# Patient Record
Sex: MALE | Race: WHITE | NOT HISPANIC OR LATINO | Employment: OTHER | URBAN - METROPOLITAN AREA
[De-identification: names, ages, dates, MRNs, and addresses within clinical notes are randomized per-mention and may not be internally consistent; named-entity substitution may affect disease eponyms.]

---

## 2020-12-03 ENCOUNTER — TELEPHONE (OUTPATIENT)
Dept: ADMINISTRATIVE | Facility: HOSPITAL | Age: 60
End: 2020-12-03

## 2020-12-03 ENCOUNTER — OFFICE VISIT (OUTPATIENT)
Dept: FAMILY MEDICINE | Facility: CLINIC | Age: 60
End: 2020-12-03
Payer: COMMERCIAL

## 2020-12-03 VITALS
BODY MASS INDEX: 30.55 KG/M2 | WEIGHT: 213.38 LBS | DIASTOLIC BLOOD PRESSURE: 92 MMHG | SYSTOLIC BLOOD PRESSURE: 152 MMHG | HEIGHT: 70 IN | OXYGEN SATURATION: 95 % | TEMPERATURE: 98 F | HEART RATE: 85 BPM

## 2020-12-03 DIAGNOSIS — E78.2 HYPERLIPIDEMIA, MIXED: ICD-10-CM

## 2020-12-03 DIAGNOSIS — R97.20 ELEVATED PSA: ICD-10-CM

## 2020-12-03 DIAGNOSIS — Z00.00 ANNUAL PHYSICAL EXAM: ICD-10-CM

## 2020-12-03 DIAGNOSIS — Z11.4 SCREENING FOR HIV (HUMAN IMMUNODEFICIENCY VIRUS): ICD-10-CM

## 2020-12-03 DIAGNOSIS — I10 ESSENTIAL HYPERTENSION: Primary | ICD-10-CM

## 2020-12-03 DIAGNOSIS — N40.0 BENIGN PROSTATIC HYPERPLASIA, UNSPECIFIED WHETHER LOWER URINARY TRACT SYMPTOMS PRESENT: ICD-10-CM

## 2020-12-03 DIAGNOSIS — Z11.59 SCREENING FOR VIRAL DISEASE: ICD-10-CM

## 2020-12-03 DIAGNOSIS — R68.82 DECREASED LIBIDO: ICD-10-CM

## 2020-12-03 PROCEDURE — 3008F PR BODY MASS INDEX (BMI) DOCUMENTED: ICD-10-PCS | Mod: CPTII,S$GLB,, | Performed by: INTERNAL MEDICINE

## 2020-12-03 PROCEDURE — 1126F PR PAIN SEVERITY QUANTIFIED, NO PAIN PRESENT: ICD-10-PCS | Mod: S$GLB,,, | Performed by: INTERNAL MEDICINE

## 2020-12-03 PROCEDURE — 3008F BODY MASS INDEX DOCD: CPT | Mod: CPTII,S$GLB,, | Performed by: INTERNAL MEDICINE

## 2020-12-03 PROCEDURE — 99999 PR PBB SHADOW E&M-NEW PATIENT-LVL III: CPT | Mod: PBBFAC,,, | Performed by: INTERNAL MEDICINE

## 2020-12-03 PROCEDURE — 99386 PREV VISIT NEW AGE 40-64: CPT | Mod: S$GLB,,, | Performed by: INTERNAL MEDICINE

## 2020-12-03 PROCEDURE — 1126F AMNT PAIN NOTED NONE PRSNT: CPT | Mod: S$GLB,,, | Performed by: INTERNAL MEDICINE

## 2020-12-03 PROCEDURE — 99999 PR PBB SHADOW E&M-NEW PATIENT-LVL III: ICD-10-PCS | Mod: PBBFAC,,, | Performed by: INTERNAL MEDICINE

## 2020-12-03 PROCEDURE — 99386 PR PREVENTIVE VISIT,NEW,40-64: ICD-10-PCS | Mod: S$GLB,,, | Performed by: INTERNAL MEDICINE

## 2020-12-03 RX ORDER — ROSUVASTATIN CALCIUM 40 MG/1
40 TABLET, COATED ORAL NIGHTLY
COMMUNITY
End: 2022-03-03 | Stop reason: SDUPTHER

## 2020-12-03 RX ORDER — ASPIRIN 81 MG/1
81 TABLET ORAL DAILY
COMMUNITY

## 2020-12-03 RX ORDER — TADALAFIL 5 MG/1
5 TABLET ORAL DAILY PRN
COMMUNITY

## 2020-12-03 RX ORDER — GLUCOSAMINE/CHONDR SU A SOD 750-600 MG
TABLET ORAL
Status: ON HOLD | COMMUNITY
End: 2022-06-28 | Stop reason: HOSPADM

## 2020-12-03 RX ORDER — IRBESARTAN 300 MG/1
300 TABLET ORAL NIGHTLY
COMMUNITY
End: 2020-12-03

## 2020-12-03 RX ORDER — IRBESARTAN AND HYDROCHLOROTHIAZIDE 300; 12.5 MG/1; MG/1
1 TABLET, FILM COATED ORAL DAILY
Qty: 90 TABLET | Refills: 3 | Status: SHIPPED | OUTPATIENT
Start: 2020-12-03 | End: 2021-11-22 | Stop reason: SDUPTHER

## 2020-12-03 NOTE — PROGRESS NOTES
[facility] Note    Chief Complaint      Chief Complaint   Patient presents with    Establish Care       History of Present Illness      Justus Shaikh is a 60 y.o. male with chronic conditions of HTN, HLD, Gilbert disease, BPH who presents today for: establish care and annual preventative visit.  Previously seen by Dr. Esequiel Mott.  Will be getting left shoulder arthroscopy with Dr. Orantes next week.  Complains of decreased libido.  HTN: BP elevated on irbesartan.  Sees Dr. Leija.  Has had recent EKG and nuclear stress test.  Will add hctz.  HLD: Controlled on crestor.  LDL due.  BPH, elevated PSA: Sees Dr. Flores.  On cialis daily. Had recent MRI which did not show any cancer.   Flu shot UTD.  Td within 10 yrs, unsure exact date.  Plan to revaccinate in 2025.  Shingrix discussed.  Pneumonia vaccine due age 63.  Cscope Dr. Solano, 4-5 yrs ago, no polyps, 10 yr interval.     Past Medical History:  History reviewed. No pertinent past medical history.    Past Surgical History:   has a past surgical history that includes Rotator cuff repair (Right); Hand surgery (Right); and Knee surgery.    Family History:  family history is not on file.     Social History:  Social History     Tobacco Use    Smoking status: Light Tobacco Smoker   Substance Use Topics    Alcohol use: Yes    Drug use: Not on file       I personally reviewed all past medical, surgical, social and family history.    Review of Systems   Constitutional: Negative for chills, fever and malaise/fatigue.   Respiratory: Negative for shortness of breath.    Cardiovascular: Negative for chest pain.   Gastrointestinal: Negative for constipation, diarrhea, nausea and vomiting.   Skin: Negative for rash.   Neurological: Negative for weakness.   All other systems reviewed and are negative.       Medications:  Outpatient Encounter Medications as of 12/3/2020   Medication Sig Dispense Refill    aspirin (ECOTRIN) 81 MG EC tablet Take 81 mg by mouth  "once daily.      folic acid/multivit-min/lutein (CENTRUM SILVER ORAL) Take by mouth.      glucosamine HCl 1,500 mg Tab Take by mouth.      hyaluronate sodium (HYALURONIC ACID, SODIUM, ORAL) Take by mouth.      rosuvastatin (CRESTOR) 40 MG Tab Take 40 mg by mouth every evening.      tadalafiL (CIALIS) 5 MG tablet Take 5 mg by mouth daily as needed for Erectile Dysfunction.      [DISCONTINUED] irbesartan (AVAPRO) 300 MG tablet Take 300 mg by mouth every evening.      irbesartan-hydrochlorothiazide (AVALIDE) 300-12.5 mg per tablet Take 1 tablet by mouth once daily. 90 tablet 3     No facility-administered encounter medications on file as of 12/3/2020.        Allergies:  Review of patient's allergies indicates:   Allergen Reactions    Pseudoephedrine hcl        Health Maintenance:    There is no immunization history on file for this patient.   Health Maintenance   Topic Date Due    Hepatitis C Screening  1960    Lipid Panel  1960    TETANUS VACCINE  04/11/1976    Pneumococcal Vaccine (Medium Risk) (1 of 1 - PPSV23) 04/11/1977        Physical Exam      Vital Signs  Temp: 97.9 °F (36.6 °C)  Temp src: Oral  Pulse: 85  SpO2: 95 %  BP: (!) 152/92  BP Location: Left arm  Patient Position: Sitting  Pain Score: 0-No pain  Height and Weight  Height: 5' 10" (177.8 cm)  Weight: 96.8 kg (213 lb 6.5 oz)  BSA (Calculated - sq m): 2.19 sq meters  BMI (Calculated): 30.6  Weight in (lb) to have BMI = 25: 173.9]    Physical Exam  Vitals signs reviewed.   Constitutional:       Appearance: He is well-developed.   HENT:      Head: Normocephalic and atraumatic.      Right Ear: External ear normal.      Left Ear: External ear normal.   Eyes:      Conjunctiva/sclera: Conjunctivae normal.      Pupils: Pupils are equal, round, and reactive to light.   Cardiovascular:      Rate and Rhythm: Normal rate and regular rhythm.      Heart sounds: Normal heart sounds. No murmur.   Pulmonary:      Effort: Pulmonary effort is " normal.      Breath sounds: Normal breath sounds. No wheezing or rales.   Abdominal:      General: Bowel sounds are normal. There is no distension or abdominal bruit.      Palpations: Abdomen is soft.      Tenderness: There is no abdominal tenderness.          Laboratory:  CBC:      CMP:        Invalid input(s): CREATININ  URINALYSIS:       LIPIDS:      TSH:      A1C:        Assessment/Plan     Justus Shaikh is a 60 y.o.male with:    1. Annual physical exam  - CBC Auto Differential; Future  - Comprehensive Metabolic Panel; Future  - Hepatitis C Antibody; Future  - HIV 1/2 Ag/Ab (4th Gen); Future  - Lipid Panel; Future  - TSH; Future  - T4, Free; Future  - PSA, Total and Free; Future  - Testosterone, Total, Males; Future  Discussed diet and exercise, vaccines and cancer screening, risk factors.  Screening labs ordered.     2. Essential hypertension  - irbesartan-hydrochlorothiazide (AVALIDE) 300-12.5 mg per tablet; Take 1 tablet by mouth once daily.  Dispense: 90 tablet; Refill: 3  Adding hctz to irbesartan.   F/U with Dr. Leija.  3. Hyperlipidemia, mixed  Continue current meds.  Labs due.  4. Elevated PSA  - PSA, Total and Free; Future  5. Benign prostatic hyperplasia, unspecified whether lower urinary tract symptoms present  Continue current meds.  Update labs.  F/U with DR. Flores.  6. Decreased libido  - Testosterone, Total, Males; Future  Will check testosterone.  Continue current meds.    7. Screening for viral disease  - Hepatitis C Antibody; Future    8. Screening for HIV (human immunodeficiency virus)  - HIV 1/2 Ag/Ab (4th Gen); Future       Chronic conditions status updated as per HPI.  Other than changes above, cont current medications and maintain follow up with specialists.  Return to clinic in 6 months.    Isidro Worthington MD  [facility]

## 2020-12-05 LAB
FREE T4: 1.1 NANOGRAM/DL (ref 0.9–1.7)
TESTOST SERPL-MCNC: 536 NG/DL (ref 250–827)

## 2020-12-07 LAB
ALBUMIN: 4.7 GRAM/DL (ref 3.5–5)
ALP SERPL-CCNC: 77 UNIT/L (ref 38–126)
ALT SERPL W P-5'-P-CCNC: 26 UNIT/L (ref 7–56)
ANION GAP SERPL CALC-SCNC: 17 MEQ/L (ref 9–18)
AST SERPL-CCNC: 30 UNIT/L (ref 7–40)
BASOPHILS ABSOLUTE COUNT: 0 K/UL (ref 0–0.2)
BASOPHILS NFR BLD: 0.7 % (ref 0–2)
BILIRUB SERPL-MCNC: 1.7 MG/DL (ref 0–1.2)
BUN BLD-MCNC: 14 MG/DL (ref 7–21)
BUN/CREAT SERPL: 18 RATIO (ref 6–22)
CALC OSMOLALITY: 276 MOSM/KG (ref 275–295)
CALCIUM SERPL-MCNC: 9.6 MG/DL (ref 8.5–10.3)
CHLORIDE SERPL-SCNC: 100 MEQ/L (ref 98–107)
CHOL/HDLC RATIO: 3
CHOLEST SERPL-MSCNC: 153 MG/DL (ref 100–200)
CO2 SERPL-SCNC: 25 MEQ/L (ref 21–31)
CREAT SERPL-MCNC: 0.8 MG/DL (ref 0.7–1.2)
DIFFERENTIAL TYPE: NORMAL
EOSINOPHIL NFR BLD: 3.3 % (ref 0–4)
EOSINOPHILS ABSOLUTE COUNT: 0.2 K/UL (ref 0–0.7)
ERYTHROCYTE [DISTWIDTH] IN BLOOD BY AUTOMATED COUNT: 12.5 GRAM/DL (ref 12–15.3)
GFR: 100.9 ML/MIN/1.73M2
GLUCOSE SERPL-MCNC: 97 MG/DL (ref 70–100)
HCT VFR BLD AUTO: 45.8 % (ref 40–52)
HCV AB S/CO SERPL IA: 0.1
HCV AB SERPL QL IA: NORMAL
HDLC SERPL-MCNC: 52 MG/DL (ref 40–75)
HGB BLD-MCNC: 15.6 GRAM/DL (ref 13.6–17.5)
HIV 1+2 AB+HIV1 P24 AG SERPL QL IA: NORMAL
LDLC SERPL CALC-MCNC: 81 MG/DL (ref 0–130)
LYMPHOCYTES %: 27.9 % (ref 15–45)
LYMPHOCYTES ABSOLUTE COUNT: 1.5 K/UL (ref 1–4.2)
MCH RBC QN AUTO: 31.1 PICOGRAM (ref 27–33)
MCHC RBC AUTO-ENTMCNC: 34.2 GRAM/DL (ref 32–36)
MCV RBC AUTO: 91.2 FEMTOLITER (ref 80–94)
MONOCYTES %: 9.2 % (ref 3–13)
MONOCYTES ABSOLUTE COUNT: 0.5 K/UL (ref 0.1–0.8)
NEUTROPHILS ABSOLUTE COUNT: 3.1 K/UL (ref 2.1–7.6)
NEUTROPHILS RELATIVE PERCENT: 58.9 % (ref 32–80)
NONHDLC SERPL-MCNC: 101 MG/DL (ref 60–125)
PLATELET # BLD AUTO: 245 K/UL (ref 150–350)
PMV BLD AUTO: 8.1 FEMTOLITER (ref 7–10.2)
POTASSIUM SERPL-SCNC: 4.4 MEQ/L (ref 3.5–5)
PSA FREE MFR SERPL: 15 % (CALC)
PSA FREE SERPL-MCNC: 0.6 NG/ML
PSA SERPL-MCNC: 4.1 NG/ML
RBC # BLD AUTO: 5.02 MIL/UL (ref 4.45–5.9)
SODIUM BLD-SCNC: 138 MEQ/L (ref 135–145)
TOTAL PROTEIN: 6.6 GRAM/DL (ref 6.3–8.2)
TRIGL SERPL-MCNC: 128 MG/DL (ref 30–150)
TSH SERPL DL<=0.005 MIU/L-ACNC: 3.58 UIL/ML (ref 0.35–4)
WBC # BLD AUTO: 5.2 K/UL (ref 4.5–11)

## 2020-12-09 ENCOUNTER — CLINICAL SUPPORT (OUTPATIENT)
Dept: URGENT CARE | Facility: CLINIC | Age: 60
End: 2020-12-09
Payer: COMMERCIAL

## 2020-12-09 DIAGNOSIS — Z78.9 NO KNOWN HEALTH PROBLEMS: Primary | ICD-10-CM

## 2020-12-09 LAB
CTP QC/QA: YES
SARS-COV-2 RDRP RESP QL NAA+PROBE: NEGATIVE

## 2020-12-09 PROCEDURE — U0002 COVID-19 LAB TEST NON-CDC: HCPCS | Mod: QW,S$GLB,, | Performed by: NURSE PRACTITIONER

## 2020-12-09 PROCEDURE — U0002: ICD-10-PCS | Mod: QW,S$GLB,, | Performed by: NURSE PRACTITIONER

## 2020-12-10 ENCOUNTER — CLINICAL SUPPORT (OUTPATIENT)
Dept: URGENT CARE | Facility: CLINIC | Age: 60
End: 2020-12-10
Payer: COMMERCIAL

## 2020-12-10 DIAGNOSIS — U07.1 COVID-19 VIRUS DETECTED: ICD-10-CM

## 2020-12-10 DIAGNOSIS — Z78.9 NO KNOWN HEALTH PROBLEMS: Primary | ICD-10-CM

## 2020-12-10 LAB
CTP QC/QA: YES
SARS-COV-2 RDRP RESP QL NAA+PROBE: POSITIVE

## 2020-12-10 PROCEDURE — U0002: ICD-10-PCS | Mod: QW,S$GLB,, | Performed by: PHYSICIAN ASSISTANT

## 2020-12-10 PROCEDURE — U0002 COVID-19 LAB TEST NON-CDC: HCPCS | Mod: QW,S$GLB,, | Performed by: PHYSICIAN ASSISTANT

## 2020-12-10 NOTE — PATIENT INSTRUCTIONS
 .CDC Testing and Quarantine Guidelines for patients with exposure to a known-positive COVID-19 person:  o A 'close exposure' is defined as anyone who has had an exposure (masked or unmasked) to a known COVID -19 positive person within 6 ft for longer than 15 minutes. If your exposure meets this definition you are required by CDC guidelines to quarantine for at least 7-10 days from time of exposure. The CDC states that a test can be performed for an asymptomatic patient (someone who does not have any symptoms) after a close exposure, and that a test should be done if you develop symptoms after a close exposure as described above.  Specifically, you can test at day 5 or later if asymptomatic, in order to get released from quarantine on day 7 or later.  If you develop symptoms sooner, you should test when your symptoms start.  o If you meet the definition of a close exposure, it will not matter whether you are experiencing symptoms- a quarantine for at least 7-10 days after a close exposure is required by CDC guidelines.  o Please note, if you decide to test as an asymptomatic during your quarantine and you are positive, you will be restarting your quarantine and moving from a possible 10 day quarantine (if you do not test), to a 11 day or greater quarantine.  o The CDC also suggests people still monitor for symptoms for a full 14 days and remember that the shorter quarantine options do not replace initial CDC guidance.  The CDC continues to recommend quarantining for 14 days as the best way to reduce risk for spreading COVID-19 - however, this is only a recommendation.  o If your exposure does not meet the above definition, you can return to your normal daily activities to include social distancing, wearing a mask and frequent handwashing.      NEGATIVE COVID TEST  o You have tested negative for COVID-19 today.  If you did not have a close exposure (as defined below) you can return to your normal daily activities  "to include social distancing, wearing a mask and frequent handwashing.  o A "close exposure" is defined as anyone who has had an exposure (masked or unmasked) to a known COVID -19 positive person within 6 ft for longer than 15 minutes. If your exposure meets this definition, you are required by CDC guidelines to quarantine for at least 7-10 days from time of exposure.  o The CDC states that a test can be performed for an asymptomatic patient (someone who does not have any symptoms) after a close exposure, and that a test should be done if you develop symptoms after a close exposure as described above.  o Specifically, you can test at day 5 or later if asymptomatic in order to get released from quarantine on day 7 or later.  If you develop symptoms sooner, you should test when your symptoms start.  o If you developed symptoms since the exposure, and your test was negative today and less than 5 days from your exposure, you still have to quarantine for 7-10 days from the date of the exposure.  o The 7-10 day quarantine begins from the day you were exposed, not the day of your test.  For example, if your exposure was on a Monday, and you waited until Friday of the same week to get tested and it was negative, your 7-10 day quarantine begins from that Monday, not the Friday you tested negative.  o Please note, if you decide to test as an asymptomatic during your quarantine and you are positive, you will be restarting your quarantine and moving from a possible 10 day quarantine (if you do not test), to a 11 day or greater quarantine.           POSITIVE COVID TEST  o You have tested positive for COVID-19 today.  Please note that patients who test positive for COVID-19 are required by the CDC to undergo isolation for 10 days after their symptoms first began.  o This isolation starts from the day you first developed symptoms, not the day of your positive test. For example, if your symptoms began on a Monday but tested " positive on the following Wednesday, your 10-day isolation begins from that Monday, not the Wednesday you tested positive.  o However, if you are asymptomatic (a person who does not have any symptoms) and COVID-19 positive, your 10-day isolation begins on the day you tested positive, regardless of exposure date.  o Also, per the CDC guidelines, once your 10 days have passed, and you have not had fever greater than 100.4F in the last 24 hours without taking any fever reducers such as Tylenol (Acetaminophen) or Motrin (Ibuprofen), you may return to your normal activities including social distancing, wearing masks, and frequent handwashing - YOU DO NOT NEED ANOTHER TEST IN ORDER TO END YOUR QUARANTINE.

## 2020-12-11 ENCOUNTER — TELEPHONE (OUTPATIENT)
Dept: PRIMARY CARE CLINIC | Facility: CLINIC | Age: 60
End: 2020-12-11

## 2020-12-11 NOTE — TELEPHONE ENCOUNTER
----- Message from Pooja Montanez sent at 12/11/2020  9:46 AM CST -----  Contact: [phone]  Pt would like call back to discuss his positive Covid-19 results

## 2020-12-11 NOTE — TELEPHONE ENCOUNTER
----- Message from Eduarda Cruz sent at 12/11/2020 10:12 AM CST -----  Type:  Patient Returning Call    Who Called: Justus  Who Left Message for Patient: Suzanne  Does the patient know what this is regarding?: pt is covid positive  Would the patient rather a call back or a response via [facility]? Call back  Best Call Back Number: [phone]  Additional Information: none

## 2021-05-20 ENCOUNTER — TELEPHONE (OUTPATIENT)
Dept: ADMINISTRATIVE | Facility: HOSPITAL | Age: 61
End: 2021-05-20

## 2021-05-20 ENCOUNTER — PATIENT OUTREACH (OUTPATIENT)
Dept: ADMINISTRATIVE | Facility: HOSPITAL | Age: 61
End: 2021-05-20

## 2021-06-03 ENCOUNTER — OFFICE VISIT (OUTPATIENT)
Dept: FAMILY MEDICINE | Facility: CLINIC | Age: 61
End: 2021-06-03
Payer: COMMERCIAL

## 2021-06-03 ENCOUNTER — TELEPHONE (OUTPATIENT)
Dept: ADMINISTRATIVE | Facility: HOSPITAL | Age: 61
End: 2021-06-03

## 2021-06-03 VITALS
OXYGEN SATURATION: 96 % | TEMPERATURE: 98 F | WEIGHT: 210.88 LBS | HEIGHT: 70 IN | DIASTOLIC BLOOD PRESSURE: 84 MMHG | BODY MASS INDEX: 30.19 KG/M2 | SYSTOLIC BLOOD PRESSURE: 132 MMHG | HEART RATE: 75 BPM

## 2021-06-03 DIAGNOSIS — R97.20 ELEVATED PSA: ICD-10-CM

## 2021-06-03 DIAGNOSIS — Z11.4 SCREENING FOR HIV (HUMAN IMMUNODEFICIENCY VIRUS): ICD-10-CM

## 2021-06-03 DIAGNOSIS — Z00.00 ANNUAL PHYSICAL EXAM: Primary | ICD-10-CM

## 2021-06-03 DIAGNOSIS — F41.9 ANXIETY: ICD-10-CM

## 2021-06-03 DIAGNOSIS — Z11.59 SCREENING FOR VIRAL DISEASE: ICD-10-CM

## 2021-06-03 DIAGNOSIS — E78.2 HYPERLIPIDEMIA, MIXED: ICD-10-CM

## 2021-06-03 DIAGNOSIS — I10 ESSENTIAL HYPERTENSION: ICD-10-CM

## 2021-06-03 DIAGNOSIS — N40.0 BENIGN PROSTATIC HYPERPLASIA, UNSPECIFIED WHETHER LOWER URINARY TRACT SYMPTOMS PRESENT: ICD-10-CM

## 2021-06-03 PROCEDURE — 99999 PR PBB SHADOW E&M-EST. PATIENT-LVL III: CPT | Mod: PBBFAC,,, | Performed by: INTERNAL MEDICINE

## 2021-06-03 PROCEDURE — 1126F AMNT PAIN NOTED NONE PRSNT: CPT | Mod: S$GLB,,, | Performed by: INTERNAL MEDICINE

## 2021-06-03 PROCEDURE — 3008F BODY MASS INDEX DOCD: CPT | Mod: CPTII,S$GLB,, | Performed by: INTERNAL MEDICINE

## 2021-06-03 PROCEDURE — 1126F PR PAIN SEVERITY QUANTIFIED, NO PAIN PRESENT: ICD-10-PCS | Mod: S$GLB,,, | Performed by: INTERNAL MEDICINE

## 2021-06-03 PROCEDURE — 3008F PR BODY MASS INDEX (BMI) DOCUMENTED: ICD-10-PCS | Mod: CPTII,S$GLB,, | Performed by: INTERNAL MEDICINE

## 2021-06-03 PROCEDURE — 99214 OFFICE O/P EST MOD 30 MIN: CPT | Mod: S$GLB,,, | Performed by: INTERNAL MEDICINE

## 2021-06-03 PROCEDURE — 99214 PR OFFICE/OUTPT VISIT, EST, LEVL IV, 30-39 MIN: ICD-10-PCS | Mod: S$GLB,,, | Performed by: INTERNAL MEDICINE

## 2021-06-03 PROCEDURE — 99999 PR PBB SHADOW E&M-EST. PATIENT-LVL III: ICD-10-PCS | Mod: PBBFAC,,, | Performed by: INTERNAL MEDICINE

## 2021-06-03 RX ORDER — LORAZEPAM 1 MG/1
1 TABLET ORAL DAILY PRN
Qty: 30 TABLET | Refills: 0 | Status: SHIPPED | OUTPATIENT
Start: 2021-06-03 | End: 2022-07-15

## 2021-06-04 LAB
ALBUMIN: 5.2 GRAM/DL (ref 3.5–5)
ALP SERPL-CCNC: 98 UNIT/L (ref 38–126)
ALT SERPL W P-5'-P-CCNC: 25 UNIT/L (ref 7–56)
ANION GAP SERPL CALC-SCNC: 16 MEQ/L (ref 9–18)
AST SERPL-CCNC: 24 UNIT/L (ref 7–40)
BASOPHILS ABSOLUTE COUNT: 0 K/UL (ref 0–0.2)
BASOPHILS NFR BLD: 0.6 % (ref 0–2)
BILIRUB SERPL-MCNC: 2.1 MG/DL (ref 0–1.2)
BUN BLD-MCNC: 15 MG/DL (ref 7–21)
BUN/CREAT SERPL: 17 RATIO (ref 6–22)
CALC OSMOLALITY: 277 MOSM/KG (ref 275–295)
CALCIUM SERPL-MCNC: 9.7 MG/DL (ref 8.5–10.3)
CHLORIDE SERPL-SCNC: 99 MEQ/L (ref 98–107)
CHOL/HDLC RATIO: 3
CHOLEST SERPL-MSCNC: 156 MG/DL (ref 100–200)
CO2 SERPL-SCNC: 27 MEQ/L (ref 21–31)
CREAT SERPL-MCNC: 0.9 MG/DL (ref 0.7–1.2)
DIFFERENTIAL TYPE: NORMAL
EOSINOPHIL NFR BLD: 2.7 % (ref 0–4)
EOSINOPHILS ABSOLUTE COUNT: 0.2 K/UL (ref 0–0.7)
ERYTHROCYTE [DISTWIDTH] IN BLOOD BY AUTOMATED COUNT: 13.1 GRAM/DL (ref 12–15.3)
FREE T4: 1.1 NANOGRAM/DL (ref 0.9–1.7)
GFR: 83.1 ML/MIN/1.73M2
GLUCOSE SERPL-MCNC: 104 MG/DL (ref 70–100)
HCT VFR BLD AUTO: 44.6 % (ref 40–52)
HDLC SERPL-MCNC: 62 MG/DL (ref 40–75)
HGB BLD-MCNC: 15.6 GRAM/DL (ref 13.6–17.5)
LDLC SERPL CALC-MCNC: 74 MG/DL (ref 0–130)
LYMPHOCYTES %: 25.6 % (ref 15–45)
LYMPHOCYTES ABSOLUTE COUNT: 1.6 K/UL (ref 1–4.2)
MCH RBC QN AUTO: 31.2 PICOGRAM (ref 27–33)
MCHC RBC AUTO-ENTMCNC: 34.9 GRAM/DL (ref 32–36)
MCV RBC AUTO: 89.4 FEMTOLITER (ref 80–94)
MONOCYTES %: 7.9 % (ref 3–13)
MONOCYTES ABSOLUTE COUNT: 0.5 K/UL (ref 0.1–0.8)
NEUTROPHILS ABSOLUTE COUNT: 4.1 K/UL (ref 2.1–7.6)
NEUTROPHILS RELATIVE PERCENT: 63.2 % (ref 32–80)
NONHDLC SERPL-MCNC: 94 MG/DL (ref 60–125)
PLATELET # BLD AUTO: 248 K/UL (ref 150–350)
PMV BLD AUTO: 7.5 FEMTOLITER (ref 7–10.2)
POTASSIUM SERPL-SCNC: 4.5 MEQ/L (ref 3.5–5)
RBC # BLD AUTO: 4.99 MIL/UL (ref 4.45–5.9)
SODIUM BLD-SCNC: 138 MEQ/L (ref 135–145)
TOTAL PROTEIN: 6.9 GRAM/DL (ref 6.3–8.2)
TRIGL SERPL-MCNC: 123 MG/DL (ref 30–150)
TSH SERPL DL<=0.005 MIU/L-ACNC: 2.44 UIL/ML (ref 0.35–4)
WBC # BLD AUTO: 6.4 K/UL (ref 4.5–11)

## 2021-06-05 LAB
PSA FREE MFR SERPL: 17 % (CALC)
PSA FREE SERPL-MCNC: 0.8 NG/ML
PSA SERPL-MCNC: 4.7 NG/ML

## 2021-06-24 RX ORDER — MINOXIDIL 2.5 MG/1
5 TABLET ORAL NIGHTLY
COMMUNITY
Start: 2021-06-18

## 2021-11-22 DIAGNOSIS — I10 ESSENTIAL HYPERTENSION: ICD-10-CM

## 2021-11-22 RX ORDER — IRBESARTAN AND HYDROCHLOROTHIAZIDE 300; 12.5 MG/1; MG/1
1 TABLET, FILM COATED ORAL DAILY
Qty: 90 TABLET | Refills: 3 | Status: SHIPPED | OUTPATIENT
Start: 2021-11-22 | End: 2022-12-02 | Stop reason: SDUPTHER

## 2021-12-03 ENCOUNTER — TELEPHONE (OUTPATIENT)
Dept: FAMILY MEDICINE | Facility: CLINIC | Age: 61
End: 2021-12-03
Payer: COMMERCIAL

## 2021-12-03 DIAGNOSIS — E78.2 HYPERLIPIDEMIA, MIXED: Primary | ICD-10-CM

## 2021-12-03 DIAGNOSIS — R97.20 ELEVATED PSA: ICD-10-CM

## 2021-12-06 LAB
ALBUMIN: 4.9 GRAM/DL (ref 3.5–5)
ALP SERPL-CCNC: 84 UNIT/L (ref 38–126)
ALT SERPL W P-5'-P-CCNC: 24 UNIT/L (ref 7–56)
ANION GAP SERPL CALC-SCNC: 18 MEQ/L (ref 9–18)
AST SERPL-CCNC: 24 UNIT/L (ref 7–40)
BILIRUB SERPL-MCNC: 2.2 MG/DL (ref 0–1.2)
BUN BLD-MCNC: 16 MG/DL (ref 7–21)
BUN/CREAT SERPL: 20 RATIO (ref 6–22)
CALC OSMOLALITY: 278 MOSM/KG (ref 275–295)
CALCIUM SERPL-MCNC: 9.6 MG/DL (ref 8.5–10.3)
CHLORIDE SERPL-SCNC: 99 MEQ/L (ref 98–107)
CHOL/HDLC RATIO: 2
CHOLEST SERPL-MSCNC: 154 MG/DL (ref 100–200)
CO2 SERPL-SCNC: 25 MEQ/L (ref 21–31)
CREAT SERPL-MCNC: 0.8 MG/DL (ref 0.7–1.2)
GFR: 94.9 ML/MIN/1.73M2
GLUCOSE SERPL-MCNC: 111 MG/DL (ref 70–100)
HDLC SERPL-MCNC: 70 MG/DL (ref 40–75)
LDLC SERPL CALC-MCNC: 70 MG/DL (ref 0–130)
NONHDLC SERPL-MCNC: 84 MG/DL (ref 60–125)
POTASSIUM SERPL-SCNC: 4.4 MEQ/L (ref 3.5–5)
SODIUM BLD-SCNC: 138 MEQ/L (ref 135–145)
TOTAL PROTEIN: 7 GRAM/DL (ref 6.3–8.2)
TRIGL SERPL-MCNC: 87 MG/DL (ref 30–150)

## 2021-12-07 LAB
PSA FREE MFR SERPL: 13 % (CALC)
PSA FREE SERPL-MCNC: 0.5 NG/ML
PSA SERPL-MCNC: 3.8 NG/ML

## 2021-12-08 ENCOUNTER — OFFICE VISIT (OUTPATIENT)
Dept: FAMILY MEDICINE | Facility: CLINIC | Age: 61
End: 2021-12-08
Payer: COMMERCIAL

## 2021-12-08 ENCOUNTER — TELEPHONE (OUTPATIENT)
Dept: ADMINISTRATIVE | Facility: HOSPITAL | Age: 61
End: 2021-12-08
Payer: COMMERCIAL

## 2021-12-08 VITALS
HEART RATE: 81 BPM | DIASTOLIC BLOOD PRESSURE: 86 MMHG | HEIGHT: 70 IN | BODY MASS INDEX: 30.13 KG/M2 | TEMPERATURE: 98 F | SYSTOLIC BLOOD PRESSURE: 130 MMHG | WEIGHT: 210.44 LBS | OXYGEN SATURATION: 96 %

## 2021-12-08 DIAGNOSIS — E78.2 HYPERLIPIDEMIA, MIXED: ICD-10-CM

## 2021-12-08 DIAGNOSIS — F41.9 ANXIETY: ICD-10-CM

## 2021-12-08 DIAGNOSIS — R97.20 ELEVATED PSA: ICD-10-CM

## 2021-12-08 DIAGNOSIS — Z23 NEED FOR VACCINATION: ICD-10-CM

## 2021-12-08 DIAGNOSIS — R73.01 IMPAIRED FASTING GLUCOSE: ICD-10-CM

## 2021-12-08 DIAGNOSIS — I10 ESSENTIAL HYPERTENSION: Primary | ICD-10-CM

## 2021-12-08 DIAGNOSIS — N40.0 BENIGN PROSTATIC HYPERPLASIA, UNSPECIFIED WHETHER LOWER URINARY TRACT SYMPTOMS PRESENT: ICD-10-CM

## 2021-12-08 PROCEDURE — 99214 PR OFFICE/OUTPT VISIT, EST, LEVL IV, 30-39 MIN: ICD-10-PCS | Mod: 25,S$GLB,, | Performed by: INTERNAL MEDICINE

## 2021-12-08 PROCEDURE — 99214 OFFICE O/P EST MOD 30 MIN: CPT | Mod: 25,S$GLB,, | Performed by: INTERNAL MEDICINE

## 2021-12-08 PROCEDURE — 90471 IMMUNIZATION ADMIN: CPT | Mod: S$GLB,,, | Performed by: INTERNAL MEDICINE

## 2021-12-08 PROCEDURE — 90686 IIV4 VACC NO PRSV 0.5 ML IM: CPT | Mod: S$GLB,,, | Performed by: INTERNAL MEDICINE

## 2021-12-08 PROCEDURE — 99999 PR PBB SHADOW E&M-EST. PATIENT-LVL IV: ICD-10-PCS | Mod: PBBFAC,,, | Performed by: INTERNAL MEDICINE

## 2021-12-08 PROCEDURE — 99999 PR PBB SHADOW E&M-EST. PATIENT-LVL IV: CPT | Mod: PBBFAC,,, | Performed by: INTERNAL MEDICINE

## 2021-12-08 PROCEDURE — 90471 FLU VACCINE (QUAD) GREATER THAN OR EQUAL TO 3YO PRESERVATIVE FREE IM: ICD-10-PCS | Mod: S$GLB,,, | Performed by: INTERNAL MEDICINE

## 2021-12-08 PROCEDURE — 90686 FLU VACCINE (QUAD) GREATER THAN OR EQUAL TO 3YO PRESERVATIVE FREE IM: ICD-10-PCS | Mod: S$GLB,,, | Performed by: INTERNAL MEDICINE

## 2021-12-10 LAB — HBA1C MFR BLD: 5.5 % (ref 4.5–5.7)

## 2022-02-03 ENCOUNTER — TELEPHONE (OUTPATIENT)
Dept: INTERNAL MEDICINE | Facility: CLINIC | Age: 62
End: 2022-02-03
Payer: COMMERCIAL

## 2022-02-03 DIAGNOSIS — E78.2 HYPERLIPIDEMIA, MIXED: ICD-10-CM

## 2022-02-03 DIAGNOSIS — Z13.6 ENCOUNTER FOR SCREENING FOR CARDIOVASCULAR DISORDERS: ICD-10-CM

## 2022-02-03 DIAGNOSIS — I10 ESSENTIAL HYPERTENSION: Primary | ICD-10-CM

## 2022-02-03 NOTE — TELEPHONE ENCOUNTER
Pt said he had at Ct Calcium score doen he thinks a year or 2 ago. Sent request to Dr. Leija office to have them fax

## 2022-02-03 NOTE — TELEPHONE ENCOUNTER
Recommend he get a CT calcium score which is a CT scan of the coronary arteries.  If he has evidence of plaque buildup, we will try for a different cholesterol-lowering regimen.  If he has no evidence of plaque buildup, we can hold off on trying a different statin.  Okay to hold off on statin until this is done

## 2022-02-03 NOTE — LETTER
AUTHORIZATION FOR RELEASE OF   CONFIDENTIAL INFORMATION    Dear Dr. Leija,    We are seeing Justus Shaikh, date of birth 1960, in the clinic at [facility]. Isidro Worthington MD is the patient's PCP. Justus Shaikh has an outstanding lab/procedure at the time we reviewed his chart. In order to help keep his health information updated, he has authorized us to request the following medical record(s):        (  )  MAMMOGRAM                                      (  )  COLONOSCOPY      (  )  PAP SMEAR                                          (  )  OUTSIDE LAB RESULTS     (  )  DEXA SCAN                                          (  )  EYE EXAM            (  )  FOOT EXAM                                          (  )  ENTIRE RECORD     (  )  OUTSIDE IMMUNIZATIONS                 (X  )CT Calcium Score       Please fax records to [facility], David M Klibert, MD,[phone]     If you have any questions, please contact [facility] at [phone].           Patient Name: Justus Shaikh  : 1960  Patient Phone #: [phone]

## 2022-02-03 NOTE — TELEPHONE ENCOUNTER
----- Message from Alba Cleveland sent at 2/3/2022  9:49 AM CST -----  Contact: Self   Pt is requesting a call regarding his month off his rx. Pt would like to give update and to discuss cholesterol test. Please advise

## 2022-02-03 NOTE — TELEPHONE ENCOUNTER
----- Message from Sara Miguel sent at 2/3/2022 10:16 AM CST -----  Contact: KIERAN DIDI [[id number]] @ [phone]  Patient want to know  if he should start to take his Rosuvastatin (CRESTOR) 40 MG Tab again after being off for a month due to leg pain. He feel better.  Do you want to change this Rx to another and do you want him to have another lab test to see how his cholesterol is doing?   He's also returning possibly Suzanne's call and to please call and to call him again

## 2022-02-07 NOTE — TELEPHONE ENCOUNTER
Pt aware of the CT order. Pt wants to know if he should check his cholesterol after being off this medication for a month?

## 2022-02-07 NOTE — TELEPHONE ENCOUNTER
I don't think 1 month is a significant enough time to see a new baseline.  I usually wait 2-3 months

## 2022-02-07 NOTE — TELEPHONE ENCOUNTER
See message from further down, regarding statin and test. Does he need to have another test? And what should he do about med

## 2022-02-07 NOTE — TELEPHONE ENCOUNTER
Let's repeat the CT calcium score.  It's been 2 years.  If the numbers are higher, I'll talk to him about a different cholesterol lowering medication.

## 2022-02-09 DIAGNOSIS — Z12.11 COLON CANCER SCREENING: ICD-10-CM

## 2022-02-15 ENCOUNTER — TELEPHONE (OUTPATIENT)
Dept: INTERNAL MEDICINE | Facility: CLINIC | Age: 62
End: 2022-02-15
Payer: COMMERCIAL

## 2022-02-15 NOTE — TELEPHONE ENCOUNTER
----- Message from Daniela Burleson sent at 2/15/2022  3:00 PM CST -----  Contact: Pt @[phone]  Patient is returning a phone call.  Who left a message for the patient:  Suzanne Arriola,    Does patient know what this is regarding:  Yes     Would you like a call back, or a response through your [facility] portal?:    call back     Comments:    Pt states that he is returning a call to see if he still need to get the calcium chloride test done. Please call back to advise.

## 2022-03-03 ENCOUNTER — HOSPITAL ENCOUNTER (OUTPATIENT)
Dept: RADIOLOGY | Facility: HOSPITAL | Age: 62
Discharge: HOME | End: 2022-03-03
Attending: INTERNAL MEDICINE
Payer: COMMERCIAL

## 2022-03-03 DIAGNOSIS — E78.2 HYPERLIPIDEMIA, MIXED: ICD-10-CM

## 2022-03-03 DIAGNOSIS — R91.1 SOLITARY PULMONARY NODULE: ICD-10-CM

## 2022-03-03 DIAGNOSIS — I10 ESSENTIAL HYPERTENSION: Primary | ICD-10-CM

## 2022-03-03 DIAGNOSIS — Z13.6 ENCOUNTER FOR SCREENING FOR CARDIOVASCULAR DISORDERS: ICD-10-CM

## 2022-03-03 DIAGNOSIS — I10 ESSENTIAL HYPERTENSION: ICD-10-CM

## 2022-03-03 PROCEDURE — 75571 CT HRT W/O DYE W/CA TEST: CPT | Mod: TC

## 2022-03-03 PROCEDURE — 75571 CT HRT W/O DYE W/CA TEST: CPT | Mod: 26,,, | Performed by: RADIOLOGY

## 2022-03-03 PROCEDURE — 75571 CT CALCIUM SCORING CARDIAC: ICD-10-PCS | Mod: 26,,, | Performed by: RADIOLOGY

## 2022-03-03 NOTE — TELEPHONE ENCOUNTER
No new care gaps identified.  Powered by [employer]. Reference number: [id number].   3/03/2022 4:48:44 PM CST

## 2022-03-03 NOTE — TELEPHONE ENCOUNTER
----- Message from Valerio Vernon sent at 3/3/2022  4:37 PM CST -----  Contact: [place]-[phone]  Requesting an RX refill or new RX.    Is this a refill or new RX: Refill    RX name and strength (copy/paste from chart):  rosuvastatin (CRESTOR) 40 MG Tab    Is this a 30 day or 90 day RX: 90    Pharmacy name and phone # (copy/paste from chart):  [id number] - [place] - [address] AT [facility]   Phone:  [phone]  Fax:  [phone]    Callback number: [phone]

## 2022-03-04 ENCOUNTER — TELEPHONE (OUTPATIENT)
Dept: INTERNAL MEDICINE | Facility: CLINIC | Age: 62
End: 2022-03-04
Payer: COMMERCIAL

## 2022-03-04 DIAGNOSIS — I10 ESSENTIAL HYPERTENSION: Primary | ICD-10-CM

## 2022-03-04 RX ORDER — ROSUVASTATIN CALCIUM 40 MG/1
40 TABLET, COATED ORAL NIGHTLY
Qty: 90 TABLET | Refills: 3 | Status: SHIPPED | OUTPATIENT
Start: 2022-03-04 | End: 2022-12-08

## 2022-03-07 ENCOUNTER — PATIENT MESSAGE (OUTPATIENT)
Dept: INTERNAL MEDICINE | Facility: CLINIC | Age: 62
End: 2022-03-07
Payer: COMMERCIAL

## 2022-03-07 ENCOUNTER — TELEPHONE (OUTPATIENT)
Dept: INTERNAL MEDICINE | Facility: CLINIC | Age: 62
End: 2022-03-07
Payer: COMMERCIAL

## 2022-03-07 DIAGNOSIS — R91.8 LUNG MASS: Primary | ICD-10-CM

## 2022-03-07 NOTE — TELEPHONE ENCOUNTER
----- Message from Alba Cleveland sent at 3/7/2022 10:47 AM CST -----  Contact: Self   Pt is requesting a call regarding his upcomming appt. Pt had questions. Please advise

## 2022-03-07 NOTE — TELEPHONE ENCOUNTER
Spoke to pt. States he had both CT scans done(3/3 and 3/4), asking for results on the second one. States he is flying out of town tomorrow and would like results before then.

## 2022-03-07 NOTE — TELEPHONE ENCOUNTER
Patient is scheduled for 3/28 at 11:00 since he will be out of town did put patient on wait list for sooner appointment

## 2022-03-07 NOTE — TELEPHONE ENCOUNTER
----- Message from Jeny Rocha sent at 3/7/2022  2:53 PM CST -----  Contact: Self [phone]  Patient would like to get medical advice.    Would you like a call back, or a response through your [facility] portal?:   call back      Comments:   Calling to speak with the nurse regarding a r/s for upcoming for 3/25/2020. Pt states he spoke with nurse, but on my med the appt was not available.

## 2022-03-08 ENCOUNTER — PATIENT MESSAGE (OUTPATIENT)
Dept: INTERNAL MEDICINE | Facility: CLINIC | Age: 62
End: 2022-03-08
Payer: COMMERCIAL

## 2022-03-22 ENCOUNTER — PATIENT MESSAGE (OUTPATIENT)
Dept: ADMINISTRATIVE | Facility: HOSPITAL | Age: 62
End: 2022-03-22
Payer: COMMERCIAL

## 2022-03-28 ENCOUNTER — HOSPITAL ENCOUNTER (OUTPATIENT)
Dept: PULMONOLOGY | Facility: CLINIC | Age: 62
Discharge: HOME | End: 2022-03-28
Payer: COMMERCIAL

## 2022-03-28 ENCOUNTER — TELEPHONE (OUTPATIENT)
Dept: PULMONOLOGY | Facility: CLINIC | Age: 62
End: 2022-03-28
Payer: COMMERCIAL

## 2022-03-28 ENCOUNTER — OFFICE VISIT (OUTPATIENT)
Dept: PULMONOLOGY | Facility: CLINIC | Age: 62
End: 2022-03-28
Payer: COMMERCIAL

## 2022-03-28 VITALS
SYSTOLIC BLOOD PRESSURE: 150 MMHG | DIASTOLIC BLOOD PRESSURE: 82 MMHG | OXYGEN SATURATION: 95 % | WEIGHT: 216.06 LBS | HEIGHT: 70 IN | HEART RATE: 84 BPM | BODY MASS INDEX: 30.93 KG/M2

## 2022-03-28 DIAGNOSIS — R91.8 LUNG MASS: ICD-10-CM

## 2022-03-28 DIAGNOSIS — R91.1 LUNG NODULE: Primary | ICD-10-CM

## 2022-03-28 PROCEDURE — 94727 PR PULM FUNCTION TEST BY GAS: ICD-10-PCS | Mod: S$GLB,,, | Performed by: INTERNAL MEDICINE

## 2022-03-28 PROCEDURE — 94729 DIFFUSING CAPACITY: CPT | Mod: S$GLB,,, | Performed by: INTERNAL MEDICINE

## 2022-03-28 PROCEDURE — 3079F PR MOST RECENT DIASTOLIC BLOOD PRESSURE 80-89 MM HG: ICD-10-PCS | Mod: CPTII,S$GLB,, | Performed by: INTERNAL MEDICINE

## 2022-03-28 PROCEDURE — 3077F PR MOST RECENT SYSTOLIC BLOOD PRESSURE >= 140 MM HG: ICD-10-PCS | Mod: CPTII,S$GLB,, | Performed by: INTERNAL MEDICINE

## 2022-03-28 PROCEDURE — 99999 PR PBB SHADOW E&M-EST. PATIENT-LVL V: CPT | Mod: PBBFAC,,, | Performed by: INTERNAL MEDICINE

## 2022-03-28 PROCEDURE — 99204 OFFICE O/P NEW MOD 45 MIN: CPT | Mod: S$GLB,,, | Performed by: INTERNAL MEDICINE

## 2022-03-28 PROCEDURE — 3077F SYST BP >= 140 MM HG: CPT | Mod: CPTII,S$GLB,, | Performed by: INTERNAL MEDICINE

## 2022-03-28 PROCEDURE — 94729 PR C02/MEMBANE DIFFUSE CAPACITY: ICD-10-PCS | Mod: S$GLB,,, | Performed by: INTERNAL MEDICINE

## 2022-03-28 PROCEDURE — 99204 PR OFFICE/OUTPT VISIT, NEW, LEVL IV, 45-59 MIN: ICD-10-PCS | Mod: S$GLB,,, | Performed by: INTERNAL MEDICINE

## 2022-03-28 PROCEDURE — 1160F PR REVIEW ALL MEDS BY PRESCRIBER/CLIN PHARMACIST DOCUMENTED: ICD-10-PCS | Mod: CPTII,S$GLB,, | Performed by: INTERNAL MEDICINE

## 2022-03-28 PROCEDURE — 94060 EVALUATION OF WHEEZING: CPT | Mod: S$GLB,,, | Performed by: INTERNAL MEDICINE

## 2022-03-28 PROCEDURE — 94060 PR EVAL OF BRONCHOSPASM: ICD-10-PCS | Mod: S$GLB,,, | Performed by: INTERNAL MEDICINE

## 2022-03-28 PROCEDURE — 1159F PR MEDICATION LIST DOCUMENTED IN MEDICAL RECORD: ICD-10-PCS | Mod: CPTII,S$GLB,, | Performed by: INTERNAL MEDICINE

## 2022-03-28 PROCEDURE — 3008F BODY MASS INDEX DOCD: CPT | Mod: CPTII,S$GLB,, | Performed by: INTERNAL MEDICINE

## 2022-03-28 PROCEDURE — 3079F DIAST BP 80-89 MM HG: CPT | Mod: CPTII,S$GLB,, | Performed by: INTERNAL MEDICINE

## 2022-03-28 PROCEDURE — 94727 GAS DIL/WSHOT DETER LNG VOL: CPT | Mod: S$GLB,,, | Performed by: INTERNAL MEDICINE

## 2022-03-28 PROCEDURE — 99999 PR PBB SHADOW E&M-EST. PATIENT-LVL V: ICD-10-PCS | Mod: PBBFAC,,, | Performed by: INTERNAL MEDICINE

## 2022-03-28 PROCEDURE — 3008F PR BODY MASS INDEX (BMI) DOCUMENTED: ICD-10-PCS | Mod: CPTII,S$GLB,, | Performed by: INTERNAL MEDICINE

## 2022-03-28 PROCEDURE — 1159F MED LIST DOCD IN RCRD: CPT | Mod: CPTII,S$GLB,, | Performed by: INTERNAL MEDICINE

## 2022-03-28 PROCEDURE — 1160F RVW MEDS BY RX/DR IN RCRD: CPT | Mod: CPTII,S$GLB,, | Performed by: INTERNAL MEDICINE

## 2022-03-28 RX ORDER — CODEINE PHOSPHATE AND GUAIFENESIN 10; 100 MG/5ML; MG/5ML
5 SOLUTION ORAL NIGHTLY PRN
COMMUNITY
Start: 2021-10-08 | End: 2022-05-20

## 2022-03-28 RX ORDER — CEFUROXIME AXETIL 250 MG/1
250 TABLET ORAL 2 TIMES DAILY
COMMUNITY
Start: 2021-10-08 | End: 2022-07-15

## 2022-03-28 RX ORDER — TADALAFIL 10 MG/1
TABLET ORAL
Status: ON HOLD | COMMUNITY
End: 2022-06-28 | Stop reason: HOSPADM

## 2022-03-28 RX ORDER — BROMPHENIRAMINE MALEATE, PSEUDOEPHEDRINE HYDROCHLORIDE, AND DEXTROMETHORPHAN HYDROBROMIDE 2; 30; 10 MG/5ML; MG/5ML; MG/5ML
5 SYRUP ORAL EVERY 6 HOURS PRN
COMMUNITY
Start: 2021-11-30 | End: 2022-05-20

## 2022-03-28 RX ORDER — HYDROCHLOROTHIAZIDE 12.5 MG/1
CAPSULE ORAL
COMMUNITY
End: 2022-06-28

## 2022-03-28 NOTE — PROGRESS NOTES
Subjective:       Patient ID: Justus Shaikh is a 61 y.o. male.    Chief Complaint: Pulmonary Nodules and Abnormal Ct Scan     HPI   This is a 61 y.o. male who is being seen in pulmonary clinic for evaluation of pulmonary nodule on CT chest.  This is  a new issue.  He presents with his wife, Nehal Shaikh. He had incidental COVID-19 December 2020.    A lung nodule was found on CT calcium scoring to eval for atherosclerotic disease with dedicated CT chest with contrast on 3/3/22 revealing an infrahilar nodule 2.5 cm with distal airway opacification.    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    His medical history includes hypertension and joint pains.  He denies dyspnea at rest or with exertion. No cough, no chest pain or tightness.   Has seasonal allergies and cough related to it. Cough is seasonal and not always persistent. Denies any weight loss. He reports frequently sweating easily compared to those around him and has tendency to have a flushed face and neck.      Never  had PFTs in the past.  PFT today with no airflow obstruction, no restriction, normal DLCO      Review of Systems   Constitutional: Positive for weight gain and night sweats. Negative for chills, activity change and fatigue.   HENT: Positive for postnasal drip and congestion.    Respiratory: Negative for cough, choking, chest tightness, shortness of breath and dyspnea on extertion.    Cardiovascular: Negative for chest pain, palpitations and leg swelling.   Endocrine: Negative for heat intolerance.    Musculoskeletal: Positive for arthralgias.   Gastrointestinal: Positive for acid reflux. Negative for nausea and vomiting.   Neurological: Negative for dizziness and headaches.       Objective:      Physical Exam   Constitutional: He is oriented to person, place, and time. He appears well-developed. He is not obese.   HENT:   Head: Normocephalic.   Nose: No mucosal edema.   Mouth/Throat:  Oropharynx is clear and moist.   Cardiovascular: Normal rate, regular rhythm and normal heart sounds.   No murmur heard.  Pulmonary/Chest: Normal expansion, symmetric chest wall expansion, effort normal and breath sounds normal. No stridor. No respiratory distress. He has no decreased breath sounds. He has no wheezes. He has no rales. Chest wall is not dull to percussion.   Abdominal: He exhibits no distension.   Musculoskeletal:         General: No edema.      Cervical back: Normal range of motion and neck supple.   Neurological: He is alert and oriented to person, place, and time.   Skin: Skin is warm and dry.   Psychiatric: He has a normal mood and affect. His behavior is normal. Thought content normal.         Assessment:       1. Lung nodule    2. Lung mass        Outpatient Encounter Medications as of 3/28/2022   Medication Sig Dispense Refill    aspirin (ECOTRIN) 81 MG EC tablet Take 81 mg by mouth once daily.      aspirin 162.5 mg Cp24 aspirin Take No date recorded No form recorded No frequency recorded No route recorded No set duration recorded No set duration amount recorded active No dosage strength recorded No dosage strength units of measure recorded      brompheniramine-pseudoeph-DM (BROMFED DM) 2-30-10 mg/5 mL Syrp Take 5 mLs by mouth every 6 (six) hours as needed.      cefUROXime (CEFTIN) 250 MG tablet Take 250 mg by mouth 2 (two) times daily.      dexAMETHasone sodium phos, PF, 10 mg/mL Kit by NOT APPLICABLE route.      docosahexaenoic acid/epa (FISH OIL ORAL) Take by mouth.      folic acid/multivit-min/lutein (CENTRUM SILVER ORAL) Take by mouth.      glucosamine HCl 1,500 mg Tab Take by mouth.      guaiFENesin-codeine 100-10 mg/5 ml (TUSSI-ORGANIDIN NR)  mg/5 mL syrup Take 5 mLs by mouth nightly as needed.      hyaluronate sodium (HYALURONIC ACID, SODIUM, ORAL) Take by mouth.      hydroCHLOROthiazide (MICROZIDE) 12.5 mg capsule hydrochlorothiazide Take No date recorded No form  recorded No frequency recorded No route recorded No set duration recorded No set duration amount recorded active No dosage strength recorded No dosage strength units of measure recorded      irbesartan-hydrochlorothiazide (AVALIDE) 300-12.5 mg per tablet Take 1 tablet by mouth once daily. 90 tablet 3    minoxidiL (LONITEN) 2.5 MG tablet Take 1.25-2.5 mg by mouth nightly.      rosuvastatin (CRESTOR) 40 MG Tab Take 1 tablet (40 mg total) by mouth every evening. 90 tablet 3    tadalafiL (CIALIS) 10 MG tablet tadalafil Take No date recorded No form recorded No frequency recorded No route recorded No set duration recorded No set duration amount recorded active No dosage strength recorded No dosage strength units of measure recorded      tadalafiL (CIALIS) 5 MG tablet Take 5 mg by mouth daily as needed for Erectile Dysfunction.      LORazepam (ATIVAN) 1 MG tablet Take 1 tablet (1 mg total) by mouth daily as needed for Anxiety. 30 tablet 0     No facility-administered encounter medications on file as of 3/28/2022.     Orders Placed This Encounter   Procedures    NM PET CT Routine Skull to Mid Thigh     Standing Status:   Future     Standing Expiration Date:   3/28/2023     Order Specific Question:   May the Radiologist modify the order per protocol to meet the clinical needs of the patient?     Answer:   Yes    Complete PFT with bronchodilator     Standing Status:   Future     Number of Occurrences:   1     Standing Expiration Date:   3/28/2023     Order Specific Question:   Release to patient     Answer:   Immediate       Plan:         Lung Nodule 2.5 cm in perihilar region  - most likely could represent a tumor. Could be AVM as well, though seems to favor a tumor, perhaps endobronchial.  - Pt is a cigar smoker and hence at a higer risk with regard to risk of lung malignancy  - Recommend PET/CT  - given central location and surrounding blood vessels, difficult to approach with CT-guided needle biopsy  - will refer  to Dr. Mcdonald for consideration of bronchoscopic and EBUS once PET has been obtained.

## 2022-03-28 NOTE — TELEPHONE ENCOUNTER
----- Message from Nicky Sesay sent at 3/28/2022  4:46 PM CDT -----  Contact: pt  Pt requesting call back , pt has a ct in the morning and just look at his instruction , pt had potatoes for lunch . Please call       Confirmed patient's contact info below:  Contact Name: Justus Shaikh  Phone Number: [phone]

## 2022-03-28 NOTE — TELEPHONE ENCOUNTER
Left message on patient voicemail, informing him that I have received his message. I advised patient that if he wants to reschedule his appointment at a later date or if he has any questions or concerns, he may contact the office. Office number has been provided.

## 2022-03-29 ENCOUNTER — TELEPHONE (OUTPATIENT)
Dept: PULMONOLOGY | Facility: CLINIC | Age: 62
End: 2022-03-29
Payer: COMMERCIAL

## 2022-03-29 NOTE — TELEPHONE ENCOUNTER
Spoke with patient, informed him that I have received his message. I advised patient that I can reschedule his Pet Ct Scan on tomorrow for 9:30am. Patient verbalized that he understand and accepted the appointment.

## 2022-03-29 NOTE — TELEPHONE ENCOUNTER
----- Message from Nicky Sesay sent at 3/29/2022  8:05 AM CDT -----  Contact: pt  Pt requesting call back , pt had potatoes yesterday for lunch and that's one of the food items he's not supposed to eat before his test today . Please call back           Confirmed patient's contact info below:  Contact Name: Justus Shaikh  Phone Number: [phone]

## 2022-03-31 ENCOUNTER — HOSPITAL ENCOUNTER (OUTPATIENT)
Dept: RADIOLOGY | Facility: HOSPITAL | Age: 62
Discharge: HOME | End: 2022-03-31
Attending: INTERNAL MEDICINE
Payer: COMMERCIAL

## 2022-03-31 DIAGNOSIS — R91.8 LUNG MASS: ICD-10-CM

## 2022-03-31 LAB — POCT GLUCOSE: 102 MG/DL (ref 70–110)

## 2022-03-31 PROCEDURE — 78815 PET IMAGE W/CT SKULL-THIGH: CPT | Mod: TC

## 2022-03-31 PROCEDURE — 78815 PET IMAGE W/CT SKULL-THIGH: CPT | Mod: 26,PS,, | Performed by: STUDENT IN AN ORGANIZED HEALTH CARE EDUCATION/TRAINING PROGRAM

## 2022-03-31 PROCEDURE — 78815 NM PET CT ROUTINE: ICD-10-PCS | Mod: 26,PS,, | Performed by: STUDENT IN AN ORGANIZED HEALTH CARE EDUCATION/TRAINING PROGRAM

## 2022-04-04 ENCOUNTER — TELEPHONE (OUTPATIENT)
Dept: INTERNAL MEDICINE | Facility: CLINIC | Age: 62
End: 2022-04-04
Payer: COMMERCIAL

## 2022-04-04 NOTE — TELEPHONE ENCOUNTER
----- Message from Maeve Maher sent at 4/4/2022 12:57 PM CDT -----  Contact: self/[phone]  Pt called in regards to getting his rx for rosuvastatin (CRESTOR) 40 MG Tab 90 tablet 3 3/4/2022   Sig: Take 1 tablet (40 mg total) by mouth every evening    Change or reduced. Due to having joint pain. Pt would like a call back        [id number] - [address] AT [facility] [address] [place] [address]  Phone: [phone] Fax: [phone]      Please advise

## 2022-04-04 NOTE — TELEPHONE ENCOUNTER
----- Message from Awilda Brooks sent at 4/4/2022  2:40 PM CDT -----  Contact: Pt mobile [phone]  Patient is returning a phone call.  Who left a message for the patient: Suzanne Arriola MA  Does patient know what this is regarding:  A message from Suzanne Arriola MA  Would you like a call back, or a response through your [facility] portal?:   Phone

## 2022-04-04 NOTE — TELEPHONE ENCOUNTER
----- Message from Jovanni Cortez sent at 4/4/2022  2:09 PM CDT -----  Contact: self [phone]  Patient is returning a phone call.  Who left a message for the patient: Suzanne Arriola MA   Does patient know what this is regarding:    Would you like a call back, or a response through your [facility] portal?:call back  Comments:      Please call and advise

## 2022-04-06 ENCOUNTER — TELEPHONE (OUTPATIENT)
Dept: PULMONOLOGY | Facility: CLINIC | Age: 62
End: 2022-04-06
Payer: COMMERCIAL

## 2022-04-06 NOTE — TELEPHONE ENCOUNTER
----- Message from Latasha Page sent at 4/6/2022  9:57 AM CDT -----  Contact: @[phone]  Pt requesting a call back regarding a referral to see a pulmonary doctor.  The patient was told to call back if he does not hear anything.

## 2022-04-06 NOTE — TELEPHONE ENCOUNTER
Spoke with patient, informed hi that I have received his message. I advised patient that I have spoken with Dr Mcdonald's Assistant and she advised me that she will speak with Dr Mcdonald in regards to scheduling patient appointment and Once she speak with Dr Mcdonald, she will contact and advise patient. Patient verbalized that he understand.

## 2022-04-07 ENCOUNTER — TELEPHONE (OUTPATIENT)
Dept: PULMONOLOGY | Facility: CLINIC | Age: 62
End: 2022-04-07
Payer: COMMERCIAL

## 2022-04-07 NOTE — TELEPHONE ENCOUNTER
----- Message from Chava Mcdonald MD sent at 4/6/2022 12:30 PM CDT -----  Yes. Sorry.. Add him to April 27th 2pm slot     DV   ----- Message -----  From: Marian Burgess MA  Sent: 4/6/2022  10:16 AM CDT  To: MD Dr Tamara Min: Are you seeing this patient from Dr Ibarra. Patient called stating he was awaiting a call from me.   Thanks, Marian

## 2022-04-07 NOTE — TELEPHONE ENCOUNTER
I spoke to patient to schedule an appointment with Dr Mcdonald on 4/27/22 at 2pm/Dr Ibarra. Appointment mailed. Patient confirmed and verbalized understanding.

## 2022-04-11 ENCOUNTER — TELEPHONE (OUTPATIENT)
Dept: PULMONOLOGY | Facility: CLINIC | Age: 62
End: 2022-04-11
Payer: COMMERCIAL

## 2022-04-11 NOTE — TELEPHONE ENCOUNTER
----- Message from Patti Martinez sent at 4/11/2022 10:47 AM CDT -----      Patient requesting earlier appt  Added to wait list    Patient can be contacted @# [phone]

## 2022-04-11 NOTE — TELEPHONE ENCOUNTER
LVM for patient to let him know this is the soonest appointment scheduled with Dr Mcdonald on 4/27/22 at 2pm. Patient to call back if he has any questions.

## 2022-04-25 DIAGNOSIS — R91.1 PULMONARY NODULE 1 CM OR GREATER IN DIAMETER: Primary | ICD-10-CM

## 2022-04-26 ENCOUNTER — PATIENT OUTREACH (OUTPATIENT)
Dept: ADMINISTRATIVE | Facility: OTHER | Age: 62
End: 2022-04-26
Payer: COMMERCIAL

## 2022-04-26 NOTE — PROGRESS NOTES
Health Maintenance Due   Topic Date Due    Pneumococcal Vaccines (Age 0-64) (1 - PCV) Never done    TETANUS VACCINE  Never done    Colorectal Cancer Screening  Never done    Shingles Vaccine (1 of 2) Never done    COVID-19 Vaccine (3 - Booster for Moderna series) 08/23/2021     Updates were requested from care everywhere.  Chart was reviewed for overdue [facility]active [facility] Encounters ([facility]) topics (CRS, Breast Cancer Screening, Eye exam)  Health Maintenance has been updated.  LINKS immunization registry triggered.  Immunizations were reconciled.

## 2022-04-27 ENCOUNTER — OFFICE VISIT (OUTPATIENT)
Dept: PULMONOLOGY | Facility: CLINIC | Age: 62
End: 2022-04-27
Payer: COMMERCIAL

## 2022-04-27 VITALS
HEIGHT: 70 IN | DIASTOLIC BLOOD PRESSURE: 59 MMHG | SYSTOLIC BLOOD PRESSURE: 130 MMHG | OXYGEN SATURATION: 96 % | BODY MASS INDEX: 30.96 KG/M2 | HEART RATE: 83 BPM | WEIGHT: 216.25 LBS

## 2022-04-27 DIAGNOSIS — R91.1 LUNG NODULE: Primary | ICD-10-CM

## 2022-04-27 PROCEDURE — 1160F RVW MEDS BY RX/DR IN RCRD: CPT | Mod: CPTII,S$GLB,, | Performed by: EMERGENCY MEDICINE

## 2022-04-27 PROCEDURE — 99999 PR PBB SHADOW E&M-EST. PATIENT-LVL IV: ICD-10-PCS | Mod: PBBFAC,,, | Performed by: EMERGENCY MEDICINE

## 2022-04-27 PROCEDURE — 3078F DIAST BP <80 MM HG: CPT | Mod: CPTII,S$GLB,, | Performed by: EMERGENCY MEDICINE

## 2022-04-27 PROCEDURE — 3008F BODY MASS INDEX DOCD: CPT | Mod: CPTII,S$GLB,, | Performed by: EMERGENCY MEDICINE

## 2022-04-27 PROCEDURE — 3078F PR MOST RECENT DIASTOLIC BLOOD PRESSURE < 80 MM HG: ICD-10-PCS | Mod: CPTII,S$GLB,, | Performed by: EMERGENCY MEDICINE

## 2022-04-27 PROCEDURE — 99213 PR OFFICE/OUTPT VISIT, EST, LEVL III, 20-29 MIN: ICD-10-PCS | Mod: S$GLB,,, | Performed by: EMERGENCY MEDICINE

## 2022-04-27 PROCEDURE — 1159F MED LIST DOCD IN RCRD: CPT | Mod: CPTII,S$GLB,, | Performed by: EMERGENCY MEDICINE

## 2022-04-27 PROCEDURE — 3075F SYST BP GE 130 - 139MM HG: CPT | Mod: CPTII,S$GLB,, | Performed by: EMERGENCY MEDICINE

## 2022-04-27 PROCEDURE — 3075F PR MOST RECENT SYSTOLIC BLOOD PRESS GE 130-139MM HG: ICD-10-PCS | Mod: CPTII,S$GLB,, | Performed by: EMERGENCY MEDICINE

## 2022-04-27 PROCEDURE — 99213 OFFICE O/P EST LOW 20 MIN: CPT | Mod: S$GLB,,, | Performed by: EMERGENCY MEDICINE

## 2022-04-27 PROCEDURE — 99999 PR PBB SHADOW E&M-EST. PATIENT-LVL IV: CPT | Mod: PBBFAC,,, | Performed by: EMERGENCY MEDICINE

## 2022-04-27 PROCEDURE — 1160F PR REVIEW ALL MEDS BY PRESCRIBER/CLIN PHARMACIST DOCUMENTED: ICD-10-PCS | Mod: CPTII,S$GLB,, | Performed by: EMERGENCY MEDICINE

## 2022-04-27 PROCEDURE — 1159F PR MEDICATION LIST DOCUMENTED IN MEDICAL RECORD: ICD-10-PCS | Mod: CPTII,S$GLB,, | Performed by: EMERGENCY MEDICINE

## 2022-04-27 PROCEDURE — 3008F PR BODY MASS INDEX (BMI) DOCUMENTED: ICD-10-PCS | Mod: CPTII,S$GLB,, | Performed by: EMERGENCY MEDICINE

## 2022-04-27 NOTE — PROGRESS NOTES
Pulmonary & Critical Care Medicine   Consultation Note    Reason for Consultation: Robotic     HPI: Referral from Dr. Joe bIarra for robotic bronchoscopy- This is a 61 y.o. male who is being seen in pulmonary clinic for evaluation of pulmonary nodule on CT chest.  This is  a new issue.  He presents with his wife, Nehal Shaikh. He had incidental COVID-19 December 2020.     A lung nodule was found on CT calcium scoring to eval for atherosclerotic disease with dedicated CT chest with contrast on 3/3/22 revealing an infrahilar nodule 2.5 cm with distal airway opacification.     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     His medical history includes hypertension and joint pains.  He denies dyspnea at rest or with exertion. No cough, no chest pain or tightness.   Has seasonal allergies and cough related to it. Cough is seasonal and not always persistent. Denies any weight loss. He reports frequently sweating easily compared to those around him and has tendency to have a flushed face and neck.      Never  had PFTs in the past.      INTERVAL HISTORY: No significant cough/sputum production, + post nasal drip. No constitutional features. No exercise intolerance. No additional complaints. Wife does endorse occasional wheezing.   PFTs look great.       Additional Pulmonary History:   Occupational/Environmental Exposures:ran [employer]. Mostly in office.. Worked with  as summer jobs. + asbestos.. 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     Exposure to Animals/Pets: None. Had dogs in past   From [facility]. Lives in [place]..   Travel History: 2019-- [place]. 2018- [place]   History of exposures to TB: none   Family History of Lung Cancer: none   Childhood history of Lung Disease:asthma as child.   Chest surgery/Trauma- None   Aspiration events- none   IS- none   Anti-Plt/OAC-baby ASA + daily motrin.    Tobacco use- Cigars occ.   Recurrent PNA-  "None   Anesthesia complications- None   Sleep- Never formally diagnoses. Snores.       No past medical history on file.  Past Surgical History:   Procedure Laterality Date    HAND SURGERY Right     KNEE SURGERY      ROTATOR CUFF REPAIR Right      Family/Social History: Reviewed and updated.        Drug Allergies:   Review of patient's allergies indicates:   Allergen Reactions    Pseudoephedrine hcl      Review of Systems:   Constitutional: Positive for weight gain and night sweats. Negative for chills, activity change and fatigue.   HENT: Positive for postnasal drip and congestion.    Respiratory: Negative for cough, choking, chest tightness, shortness of breath and dyspnea on extertion.    Cardiovascular: Negative for chest pain, palpitations and leg swelling.   Endocrine: Negative for heat intolerance.    Musculoskeletal: Positive for arthralgias.   Gastrointestinal: Positive for acid reflux. Negative for nausea and vomiting.   Neurological: Negative for dizziness and headaches.   A comprehensive 12-point review of systems was performed, and is negative except for those items mentioned above in the HPI section of this note.     Vital Signs:  BP (!) 130/59 (BP Location: Left arm, Patient Position: Sitting)   Pulse 83   Ht 5' 10" (1.778 m)   Wt 98.1 kg (216 lb 4.3 oz)   SpO2 96%   BMI 31.03 kg/m²      Physical Exam:   GEN- NAD AAOx3 Well Built, Well Appearing   HEENT- ATNC, PERRLA, EOMI, OP-Cl. No JVD, LAD or bruit noted. Trachea Midline.   CV- RRR No M/R/G  RESP- CTA-Bilateral   GI- S/NT/ND. Positive BS X 4. No HSM Noted  BACK- Spine midline. No step off, crepitus or deformity noted. No midline TTP.   Ext- MAEW, No deformity. No edema or rashes noted.   Neuro- Strength 5/5 symmetric. CN 2-12 intact. Normal gait. Normal sensation.      Personal Review and Summary of Prior Diagnostics    Laboratory Studies: Reviewed      Latest Reference Range & Units 06/04/21 08:28   WBC 4.5 - 11.0 K/UL 6.4   RBC 4.45 - " 5.90 MIL/uL 4.99   Hemoglobin 13.6 - 17.5 gram/dL 15.6   Hematocrit 40.0 - 52.0 % 44.6   MCV 80.0 - 94.0 Femtoliter 89.4   MCH 27.0 - 33.0 Picogram 31.2   MCHC 32.0 - 36.0 gram/dL 34.9   RDW 12.0 - 15.3 gram/dL 13.1   Platelets 150 - 350 K/   MPV 7.0 - 10.2 Femtoliter 7.5   Neutrophils Relative 32.0 - 80.0 % 63.2   Lymphocytes % 15.0 - 45.0 % 25.6   Monocytes 3.0 - 13.0 % 7.9   Eosinophil % 0.0 - 4.0 % 2.7   Basophil % 0.0 - 2.0 % 0.6   Neutrophils, Abs 2.1 - 7.6 K/UL 4.1   Lymphocytes Absolute 1.0 - 4.2 K/UL 1.6   Eosinophils Absolute 0.0 - 0.7 K/UL 0.2   Basophils Absolute 0.0 - 0.2 K/UL 0.0      Latest Reference Range & Units 12/06/21 09:56 12/06/21 09:58 12/10/21 10:09   Sodium 135 - 145 mEq/L 138     Potassium 3.5 - 5.0 mEq/L 4.4     Chloride 98 - 107 mEq/L 99     CO2 21 - 31 mEq/L 25     BUN 7 - 21 mg/dL 16     Creatinine 0.7 - 1.2 mg/dL 0.8     BUN/CREAT RATIO 6 - 22 Ratio 20     GFR >=60.0 mL/min/1.73m2 94.9 [1]     Glucose 70 - 100 mg/dL 111 (H)     Calcium 8.5 - 10.3 mg/dL 9.6     Alkaline Phosphatase 38 - 126 unit/L 84     Total Protein 6.3 - 8.2 gram/dL 7.0     Albumin 3.5 - 5.0 gram/dL 4.9     BILIRUBIN TOTAL 0.0 - 1.2 mg/dL 2.2 (H) [2]     AST 7 - 40 unit/L 24     ALT 7 - 56 unit/L 24     Triglycerides 30 - 150 mg/dL 87     Calc Osmolality 275 - 295 mOsm/kg 278     Cholesterol 100 - 200 mg/dL 154     HDL 40 - 75 mg/dL 70     CHOL/HDLC Ratio  2     LDL Calculated 0 - 130 mg/dL 70     Non-HDL Cholesterol 60 - 125 mg/dL 84     Hemoglobin A1C External 4.5 - 5.7 %   5.5   Total PSA < OR = 4.0 ng/mL  3.8    Free PSA ng/mL  0.5    % Free PSA >25 % (calc)  13 (L) [3]          Microbiology Data: None     Summary of Chest Imaging Personally Reviewed:     CT Chest 3/2022- 1. Heterogeneously enhancing infrahilar noncalcified nodule measuring up to 2.5 cm in size.  Findings are suspicious for neoplasm including carcinoid with some postobstructive changes seen within the right lower lobe inferior to this  region.     NM PET 3/31- Non FDG avid infrahilar right lower lobe pulmonary nodule with associated bronchial obstruction.  Lack of significant FDG avidity and endobronchial association suggest potential carcinoid tumor    2D Echo: None     PFT's:     FEV1/FVC- 70   FEV1- 2.91L (87%)   FVC- 4.14L (95%)   TLC- 93   RV- 96   DLCO- 98         Assessment:       No diagnosis found.    Outpatient Encounter Medications as of 4/27/2022   Medication Sig Dispense Refill    aspirin (ECOTRIN) 81 MG EC tablet Take 81 mg by mouth once daily.      aspirin 162.5 mg Cp24 aspirin Take No date recorded No form recorded No frequency recorded No route recorded No set duration recorded No set duration amount recorded active No dosage strength recorded No dosage strength units of measure recorded      brompheniramine-pseudoeph-DM (BROMFED DM) 2-30-10 mg/5 mL Syrp Take 5 mLs by mouth every 6 (six) hours as needed.      cefUROXime (CEFTIN) 250 MG tablet Take 250 mg by mouth 2 (two) times daily.      dexAMETHasone sodium phos, PF, 10 mg/mL Kit by NOT APPLICABLE route.      docosahexaenoic acid/epa (FISH OIL ORAL) Take by mouth.      folic acid/multivit-min/lutein (CENTRUM SILVER ORAL) Take by mouth.      glucosamine HCl 1,500 mg Tab Take by mouth.      guaiFENesin-codeine 100-10 mg/5 ml (TUSSI-ORGANIDIN NR)  mg/5 mL syrup Take 5 mLs by mouth nightly as needed.      hyaluronate sodium (HYALURONIC ACID, SODIUM, ORAL) Take by mouth.      hydroCHLOROthiazide (MICROZIDE) 12.5 mg capsule hydrochlorothiazide Take No date recorded No form recorded No frequency recorded No route recorded No set duration recorded No set duration amount recorded active No dosage strength recorded No dosage strength units of measure recorded      irbesartan-hydrochlorothiazide (AVALIDE) 300-12.5 mg per tablet Take 1 tablet by mouth once daily. 90 tablet 3    LORazepam (ATIVAN) 1 MG tablet Take 1 tablet (1 mg total) by mouth daily as needed for Anxiety.  30 tablet 0    minoxidiL (LONITEN) 2.5 MG tablet Take 1.25-2.5 mg by mouth nightly.      rosuvastatin (CRESTOR) 40 MG Tab Take 1 tablet (40 mg total) by mouth every evening. 90 tablet 3    tadalafiL (CIALIS) 10 MG tablet tadalafil Take No date recorded No form recorded No frequency recorded No route recorded No set duration recorded No set duration amount recorded active No dosage strength recorded No dosage strength units of measure recorded      tadalafiL (CIALIS) 5 MG tablet Take 5 mg by mouth daily as needed for Erectile Dysfunction.       No facility-administered encounter medications on file as of 4/27/2022.     No orders of the defined types were placed in this encounter.      Plan:       1. Pulmonary Nodule-  Non FDG avid infrahilar right lower lobe pulmonary nodule measuring 2.5cm. Minimal post obstructive features. Concern for potential carcinoid. PFTs are normal. No OAC. ASA daily..  Functional status adequate without respiratory symptoms or exertional limitation.     -- Plan for robotic + EBUS. May 6th. Airway eval + cytology +/- cultures.   -- CT adequate will need disk   -- No prior anesthesia complications- separate consent on day of Surgery.  -- Hold ASA/NSAIDS 5 days prior.      Discussed procedure in detail. Risk including PTX, respiratory failure, bleeding and numerous additional potential complications up to death discussed. All questions answered and in agreement to proceed. Wife present for conversation. Written consent signed and on chart.       Chava Mcdonald M.D.   [facility]

## 2022-04-27 NOTE — H&P (VIEW-ONLY)
Pulmonary & Critical Care Medicine   Consultation Note    Reason for Consultation: Robotic     HPI: Referral from Dr. Joe Ibarra for robotic bronchoscopy- This is a 61 y.o. male who is being seen in pulmonary clinic for evaluation of pulmonary nodule on CT chest.  This is  a new issue.  He presents with his wife, Nehal Shaikh. He had incidental COVID-19 December 2020.     A lung nodule was found on CT calcium scoring to eval for atherosclerotic disease with dedicated CT chest with contrast on 3/3/22 revealing an infrahilar nodule 2.5 cm with distal airway opacification.     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     His medical history includes hypertension and joint pains.  He denies dyspnea at rest or with exertion. No cough, no chest pain or tightness.   Has seasonal allergies and cough related to it. Cough is seasonal and not always persistent. Denies any weight loss. He reports frequently sweating easily compared to those around him and has tendency to have a flushed face and neck.      Never  had PFTs in the past.      INTERVAL HISTORY: No significant cough/sputum production, + post nasal drip. No constitutional features. No exercise intolerance. No additional complaints. Wife does endorse occasional wheezing.   PFTs look great.       Additional Pulmonary History:   Occupational/Environmental Exposures:ran [employer]. Mostly in office.. Worked with  as summer jobs. + asbestos.. 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     Exposure to Animals/Pets: None. Had dogs in past   From [facility]. Lives in [place]..   Travel History: 2019-- [place]. 2018- [place]   History of exposures to TB: none   Family History of Lung Cancer: none   Childhood history of Lung Disease:asthma as child.   Chest surgery/Trauma- None   Aspiration events- none   IS- none   Anti-Plt/OAC-baby ASA + daily motrin.    Tobacco use- Cigars occ.   Recurrent PNA-  "None   Anesthesia complications- None   Sleep- Never formally diagnoses. Snores.       No past medical history on file.  Past Surgical History:   Procedure Laterality Date    HAND SURGERY Right     KNEE SURGERY      ROTATOR CUFF REPAIR Right      Family/Social History: Reviewed and updated.        Drug Allergies:   Review of patient's allergies indicates:   Allergen Reactions    Pseudoephedrine hcl      Review of Systems:   Constitutional: Positive for weight gain and night sweats. Negative for chills, activity change and fatigue.   HENT: Positive for postnasal drip and congestion.    Respiratory: Negative for cough, choking, chest tightness, shortness of breath and dyspnea on extertion.    Cardiovascular: Negative for chest pain, palpitations and leg swelling.   Endocrine: Negative for heat intolerance.    Musculoskeletal: Positive for arthralgias.   Gastrointestinal: Positive for acid reflux. Negative for nausea and vomiting.   Neurological: Negative for dizziness and headaches.   A comprehensive 12-point review of systems was performed, and is negative except for those items mentioned above in the HPI section of this note.     Vital Signs:  BP (!) 130/59 (BP Location: Left arm, Patient Position: Sitting)   Pulse 83   Ht 5' 10" (1.778 m)   Wt 98.1 kg (216 lb 4.3 oz)   SpO2 96%   BMI 31.03 kg/m²      Physical Exam:   GEN- NAD AAOx3 Well Built, Well Appearing   HEENT- ATNC, PERRLA, EOMI, OP-Cl. No JVD, LAD or bruit noted. Trachea Midline.   CV- RRR No M/R/G  RESP- CTA-Bilateral   GI- S/NT/ND. Positive BS X 4. No HSM Noted  BACK- Spine midline. No step off, crepitus or deformity noted. No midline TTP.   Ext- MAEW, No deformity. No edema or rashes noted.   Neuro- Strength 5/5 symmetric. CN 2-12 intact. Normal gait. Normal sensation.      Personal Review and Summary of Prior Diagnostics    Laboratory Studies: Reviewed      Latest Reference Range & Units 06/04/21 08:28   WBC 4.5 - 11.0 K/UL 6.4   RBC 4.45 - " 5.90 MIL/uL 4.99   Hemoglobin 13.6 - 17.5 gram/dL 15.6   Hematocrit 40.0 - 52.0 % 44.6   MCV 80.0 - 94.0 Femtoliter 89.4   MCH 27.0 - 33.0 Picogram 31.2   MCHC 32.0 - 36.0 gram/dL 34.9   RDW 12.0 - 15.3 gram/dL 13.1   Platelets 150 - 350 K/   MPV 7.0 - 10.2 Femtoliter 7.5   Neutrophils Relative 32.0 - 80.0 % 63.2   Lymphocytes % 15.0 - 45.0 % 25.6   Monocytes 3.0 - 13.0 % 7.9   Eosinophil % 0.0 - 4.0 % 2.7   Basophil % 0.0 - 2.0 % 0.6   Neutrophils, Abs 2.1 - 7.6 K/UL 4.1   Lymphocytes Absolute 1.0 - 4.2 K/UL 1.6   Eosinophils Absolute 0.0 - 0.7 K/UL 0.2   Basophils Absolute 0.0 - 0.2 K/UL 0.0      Latest Reference Range & Units 12/06/21 09:56 12/06/21 09:58 12/10/21 10:09   Sodium 135 - 145 mEq/L 138     Potassium 3.5 - 5.0 mEq/L 4.4     Chloride 98 - 107 mEq/L 99     CO2 21 - 31 mEq/L 25     BUN 7 - 21 mg/dL 16     Creatinine 0.7 - 1.2 mg/dL 0.8     BUN/CREAT RATIO 6 - 22 Ratio 20     GFR >=60.0 mL/min/1.73m2 94.9 [1]     Glucose 70 - 100 mg/dL 111 (H)     Calcium 8.5 - 10.3 mg/dL 9.6     Alkaline Phosphatase 38 - 126 unit/L 84     Total Protein 6.3 - 8.2 gram/dL 7.0     Albumin 3.5 - 5.0 gram/dL 4.9     BILIRUBIN TOTAL 0.0 - 1.2 mg/dL 2.2 (H) [2]     AST 7 - 40 unit/L 24     ALT 7 - 56 unit/L 24     Triglycerides 30 - 150 mg/dL 87     Calc Osmolality 275 - 295 mOsm/kg 278     Cholesterol 100 - 200 mg/dL 154     HDL 40 - 75 mg/dL 70     CHOL/HDLC Ratio  2     LDL Calculated 0 - 130 mg/dL 70     Non-HDL Cholesterol 60 - 125 mg/dL 84     Hemoglobin A1C External 4.5 - 5.7 %   5.5   Total PSA < OR = 4.0 ng/mL  3.8    Free PSA ng/mL  0.5    % Free PSA >25 % (calc)  13 (L) [3]          Microbiology Data: None     Summary of Chest Imaging Personally Reviewed:     CT Chest 3/2022- 1. Heterogeneously enhancing infrahilar noncalcified nodule measuring up to 2.5 cm in size.  Findings are suspicious for neoplasm including carcinoid with some postobstructive changes seen within the right lower lobe inferior to this  region.     NM PET 3/31- Non FDG avid infrahilar right lower lobe pulmonary nodule with associated bronchial obstruction.  Lack of significant FDG avidity and endobronchial association suggest potential carcinoid tumor    2D Echo: None     PFT's:     FEV1/FVC- 70   FEV1- 2.91L (87%)   FVC- 4.14L (95%)   TLC- 93   RV- 96   DLCO- 98         Assessment:       No diagnosis found.    Outpatient Encounter Medications as of 4/27/2022   Medication Sig Dispense Refill    aspirin (ECOTRIN) 81 MG EC tablet Take 81 mg by mouth once daily.      aspirin 162.5 mg Cp24 aspirin Take No date recorded No form recorded No frequency recorded No route recorded No set duration recorded No set duration amount recorded active No dosage strength recorded No dosage strength units of measure recorded      brompheniramine-pseudoeph-DM (BROMFED DM) 2-30-10 mg/5 mL Syrp Take 5 mLs by mouth every 6 (six) hours as needed.      cefUROXime (CEFTIN) 250 MG tablet Take 250 mg by mouth 2 (two) times daily.      dexAMETHasone sodium phos, PF, 10 mg/mL Kit by NOT APPLICABLE route.      docosahexaenoic acid/epa (FISH OIL ORAL) Take by mouth.      folic acid/multivit-min/lutein (CENTRUM SILVER ORAL) Take by mouth.      glucosamine HCl 1,500 mg Tab Take by mouth.      guaiFENesin-codeine 100-10 mg/5 ml (TUSSI-ORGANIDIN NR)  mg/5 mL syrup Take 5 mLs by mouth nightly as needed.      hyaluronate sodium (HYALURONIC ACID, SODIUM, ORAL) Take by mouth.      hydroCHLOROthiazide (MICROZIDE) 12.5 mg capsule hydrochlorothiazide Take No date recorded No form recorded No frequency recorded No route recorded No set duration recorded No set duration amount recorded active No dosage strength recorded No dosage strength units of measure recorded      irbesartan-hydrochlorothiazide (AVALIDE) 300-12.5 mg per tablet Take 1 tablet by mouth once daily. 90 tablet 3    LORazepam (ATIVAN) 1 MG tablet Take 1 tablet (1 mg total) by mouth daily as needed for Anxiety.  30 tablet 0    minoxidiL (LONITEN) 2.5 MG tablet Take 1.25-2.5 mg by mouth nightly.      rosuvastatin (CRESTOR) 40 MG Tab Take 1 tablet (40 mg total) by mouth every evening. 90 tablet 3    tadalafiL (CIALIS) 10 MG tablet tadalafil Take No date recorded No form recorded No frequency recorded No route recorded No set duration recorded No set duration amount recorded active No dosage strength recorded No dosage strength units of measure recorded      tadalafiL (CIALIS) 5 MG tablet Take 5 mg by mouth daily as needed for Erectile Dysfunction.       No facility-administered encounter medications on file as of 4/27/2022.     No orders of the defined types were placed in this encounter.      Plan:       1. Pulmonary Nodule-  Non FDG avid infrahilar right lower lobe pulmonary nodule measuring 2.5cm. Minimal post obstructive features. Concern for potential carcinoid. PFTs are normal. No OAC. ASA daily..  Functional status adequate without respiratory symptoms or exertional limitation.     -- Plan for robotic + EBUS. May 6th. Airway eval + cytology +/- cultures.   -- CT adequate will need disk   -- No prior anesthesia complications- separate consent on day of Surgery.  -- Hold ASA/NSAIDS 5 days prior.      Discussed procedure in detail. Risk including PTX, respiratory failure, bleeding and numerous additional potential complications up to death discussed. All questions answered and in agreement to proceed. Wife present for conversation. Written consent signed and on chart.       Chava Mcdonald M.D.   [facility]

## 2022-05-05 ENCOUNTER — TELEPHONE (OUTPATIENT)
Dept: PULMONOLOGY | Facility: CLINIC | Age: 62
End: 2022-05-05
Payer: COMMERCIAL

## 2022-05-05 ENCOUNTER — PATIENT MESSAGE (OUTPATIENT)
Dept: PULMONOLOGY | Facility: CLINIC | Age: 62
End: 2022-05-05
Payer: COMMERCIAL

## 2022-05-05 NOTE — TELEPHONE ENCOUNTER
LVM for patient to return my call.     I was calling patient to let him know arrival time to [facility] tomorrow for 6:30am for robotic bronchoscopy with Dr Mcdonald. Patient to call back to discuss.

## 2022-05-05 NOTE — TELEPHONE ENCOUNTER
I spoke to patient to let him know arrival to [facility] for 6:30am tomorrow. Patient confirmed and verbalized understanding.

## 2022-05-05 NOTE — TELEPHONE ENCOUNTER
----- Message from Nicky Sesay sent at 5/5/2022  3:49 PM CDT -----  Contact: pt  Pt requesting call back , pt is at [facility] and ask to text him      Confirmed patient's contact info below:  Contact Name: Justus Shaikh  Phone Number: [phone]

## 2022-05-06 ENCOUNTER — ANESTHESIA (OUTPATIENT)
Dept: SURGERY | Facility: HOSPITAL | Age: 62
End: 2022-05-06
Payer: COMMERCIAL

## 2022-05-06 ENCOUNTER — ANESTHESIA EVENT (OUTPATIENT)
Dept: SURGERY | Facility: HOSPITAL | Age: 62
End: 2022-05-06
Payer: COMMERCIAL

## 2022-05-06 ENCOUNTER — HOSPITAL ENCOUNTER (OUTPATIENT)
Facility: HOSPITAL | Age: 62
Discharge: HOME | End: 2022-05-06
Attending: EMERGENCY MEDICINE | Admitting: EMERGENCY MEDICINE
Payer: COMMERCIAL

## 2022-05-06 VITALS
HEIGHT: 70 IN | RESPIRATION RATE: 17 BRPM | WEIGHT: 216 LBS | BODY MASS INDEX: 30.92 KG/M2 | DIASTOLIC BLOOD PRESSURE: 64 MMHG | HEART RATE: 71 BPM | SYSTOLIC BLOOD PRESSURE: 127 MMHG | TEMPERATURE: 98 F | OXYGEN SATURATION: 95 %

## 2022-05-06 DIAGNOSIS — R91.8 LUNG MASS: Primary | ICD-10-CM

## 2022-05-06 DIAGNOSIS — R91.1 PULMONARY NODULE: ICD-10-CM

## 2022-05-06 PROCEDURE — 88173 PR  INTERPRETATION OF FNA SMEAR: ICD-10-PCS | Mod: 26,,, | Performed by: PATHOLOGY

## 2022-05-06 PROCEDURE — 88341 IMHCHEM/IMCYTCHM EA ADD ANTB: CPT | Mod: 59 | Performed by: PATHOLOGY

## 2022-05-06 PROCEDURE — 25000003 PHARM REV CODE 250: Performed by: NURSE ANESTHETIST, CERTIFIED REGISTERED

## 2022-05-06 PROCEDURE — 88173 CYTOPATH EVAL FNA REPORT: CPT | Mod: 26,,, | Performed by: PATHOLOGY

## 2022-05-06 PROCEDURE — 88342 IMHCHEM/IMCYTCHM 1ST ANTB: CPT | Mod: 26,59,, | Performed by: PATHOLOGY

## 2022-05-06 PROCEDURE — 31627 PR BRONCHOSCOPY,COMPUTER ASSIST/IMAGE-GUIDED NAVIGATION: ICD-10-PCS | Mod: ,,, | Performed by: EMERGENCY MEDICINE

## 2022-05-06 PROCEDURE — 36000708 HC OR TIME LEV III 1ST 15 MIN: Performed by: EMERGENCY MEDICINE

## 2022-05-06 PROCEDURE — 31628 BRONCHOSCOPY/LUNG BX EACH: CPT | Mod: 51,RT,, | Performed by: EMERGENCY MEDICINE

## 2022-05-06 PROCEDURE — 88305 TISSUE EXAM BY PATHOLOGIST: CPT | Mod: 26,,, | Performed by: PATHOLOGY

## 2022-05-06 PROCEDURE — 37000008 HC ANESTHESIA 1ST 15 MINUTES: Performed by: EMERGENCY MEDICINE

## 2022-05-06 PROCEDURE — 88360 PR  TUMOR IMMUNOHISTOCHEM/MANUAL: ICD-10-PCS | Mod: 26,,, | Performed by: PATHOLOGY

## 2022-05-06 PROCEDURE — 88360 TUMOR IMMUNOHISTOCHEM/MANUAL: CPT | Mod: 26,,, | Performed by: PATHOLOGY

## 2022-05-06 PROCEDURE — 36000709 HC OR TIME LEV III EA ADD 15 MIN: Performed by: EMERGENCY MEDICINE

## 2022-05-06 PROCEDURE — 31654 PR BRONCH W/ EBUS, DIAG OR THERA INTERVENTION PERIPHERAL LESION(S), INCL GUID, ADD ON CODE: ICD-10-PCS | Mod: ,,, | Performed by: EMERGENCY MEDICINE

## 2022-05-06 PROCEDURE — 31628 PR BRONCHOSCOPY,TRANSBRONCH BIOPSY: ICD-10-PCS | Mod: 51,RT,, | Performed by: EMERGENCY MEDICINE

## 2022-05-06 PROCEDURE — 88177 CYTP FNA EVAL EA ADDL: CPT | Mod: 26,,, | Performed by: PATHOLOGY

## 2022-05-06 PROCEDURE — 88172 CYTP DX EVAL FNA 1ST EA SITE: CPT | Mod: 26,,, | Performed by: PATHOLOGY

## 2022-05-06 PROCEDURE — 88342 IMHCHEM/IMCYTCHM 1ST ANTB: CPT | Performed by: PATHOLOGY

## 2022-05-06 PROCEDURE — 27201423 OPTIME MED/SURG SUP & DEVICES STERILE SUPPLY: Performed by: EMERGENCY MEDICINE

## 2022-05-06 PROCEDURE — 88177 PR  EVALUATION OF FNA SMEAR TO DETERMINE ADEQUACY, EA ADD EVAL: ICD-10-PCS | Mod: 26,,, | Performed by: PATHOLOGY

## 2022-05-06 PROCEDURE — 63600175 PHARM REV CODE 636 W HCPCS: Performed by: NURSE ANESTHETIST, CERTIFIED REGISTERED

## 2022-05-06 PROCEDURE — 31652 PR BRONCH W/ EBUS, SAMPLING 1 OR 2 NODES, INCL GUIDE: ICD-10-PCS | Mod: ,,, | Performed by: EMERGENCY MEDICINE

## 2022-05-06 PROCEDURE — 88172 CYTP DX EVAL FNA 1ST EA SITE: CPT | Performed by: PATHOLOGY

## 2022-05-06 PROCEDURE — 31652 BRONCH EBUS SAMPLNG 1/2 NODE: CPT | Mod: ,,, | Performed by: EMERGENCY MEDICINE

## 2022-05-06 PROCEDURE — 88341 IMHCHEM/IMCYTCHM EA ADD ANTB: CPT | Mod: 26,59,, | Performed by: PATHOLOGY

## 2022-05-06 PROCEDURE — 71000015 HC POSTOP RECOV 1ST HR: Performed by: EMERGENCY MEDICINE

## 2022-05-06 PROCEDURE — D9220A PRA ANESTHESIA: Mod: CRNA,,, | Performed by: NURSE ANESTHETIST, CERTIFIED REGISTERED

## 2022-05-06 PROCEDURE — D9220A PRA ANESTHESIA: Mod: ANES,,, | Performed by: SURGERY

## 2022-05-06 PROCEDURE — 31624 PR BRONCHOSCOPY,DIAG2STIC W LAVAGE: ICD-10-PCS | Mod: 51,RT,, | Performed by: EMERGENCY MEDICINE

## 2022-05-06 PROCEDURE — 31624 DX BRONCHOSCOPE/LAVAGE: CPT | Mod: 51,RT,, | Performed by: EMERGENCY MEDICINE

## 2022-05-06 PROCEDURE — 88112 CYTOPATH CELL ENHANCE TECH: CPT | Performed by: PATHOLOGY

## 2022-05-06 PROCEDURE — 25000003 PHARM REV CODE 250: Performed by: EMERGENCY MEDICINE

## 2022-05-06 PROCEDURE — 31654 BRONCH EBUS IVNTJ PERPH LES: CPT | Mod: ,,, | Performed by: EMERGENCY MEDICINE

## 2022-05-06 PROCEDURE — D9220A PRA ANESTHESIA: ICD-10-PCS | Mod: CRNA,,, | Performed by: NURSE ANESTHETIST, CERTIFIED REGISTERED

## 2022-05-06 PROCEDURE — 37000009 HC ANESTHESIA EA ADD 15 MINS: Performed by: EMERGENCY MEDICINE

## 2022-05-06 PROCEDURE — 88360 TUMOR IMMUNOHISTOCHEM/MANUAL: CPT | Performed by: PATHOLOGY

## 2022-05-06 PROCEDURE — 88112 PR  CYTOPATH, CELL ENHANCE TECH: ICD-10-PCS | Mod: 26,59,, | Performed by: PATHOLOGY

## 2022-05-06 PROCEDURE — 31629 BRONCHOSCOPY/NEEDLE BX EACH: CPT | Mod: 59,RT,, | Performed by: EMERGENCY MEDICINE

## 2022-05-06 PROCEDURE — 88305 TISSUE EXAM BY PATHOLOGIST: CPT | Performed by: PATHOLOGY

## 2022-05-06 PROCEDURE — 88173 CYTOPATH EVAL FNA REPORT: CPT | Performed by: PATHOLOGY

## 2022-05-06 PROCEDURE — 88177 CYTP FNA EVAL EA ADDL: CPT | Mod: 59 | Performed by: PATHOLOGY

## 2022-05-06 PROCEDURE — 88172 PR  EVALUATION OF FNA SMEAR TO DETERMINE ADEQUACY, FIRST EVAL: ICD-10-PCS | Mod: 26,,, | Performed by: PATHOLOGY

## 2022-05-06 PROCEDURE — 88342 CHG IMMUNOCYTOCHEMISTRY: ICD-10-PCS | Mod: 26,59,, | Performed by: PATHOLOGY

## 2022-05-06 PROCEDURE — 88341 PR IHC OR ICC EACH ADD'L SINGLE ANTIBODY  STAINPR: ICD-10-PCS | Mod: 26,59,, | Performed by: PATHOLOGY

## 2022-05-06 PROCEDURE — C1726 CATH, BAL DIL, NON-VASCULAR: HCPCS | Performed by: EMERGENCY MEDICINE

## 2022-05-06 PROCEDURE — D9220A PRA ANESTHESIA: ICD-10-PCS | Mod: ANES,,, | Performed by: SURGERY

## 2022-05-06 PROCEDURE — 31629 PR BRONCHOSCOPY,TRANSBRON ASPIR BX: ICD-10-PCS | Mod: 59,RT,, | Performed by: EMERGENCY MEDICINE

## 2022-05-06 PROCEDURE — 88305 TISSUE EXAM BY PATHOLOGIST: ICD-10-PCS | Mod: 26,,, | Performed by: PATHOLOGY

## 2022-05-06 PROCEDURE — 31627 NAVIGATIONAL BRONCHOSCOPY: CPT | Mod: ,,, | Performed by: EMERGENCY MEDICINE

## 2022-05-06 PROCEDURE — 71000044 HC DOSC ROUTINE RECOVERY FIRST HOUR: Performed by: EMERGENCY MEDICINE

## 2022-05-06 PROCEDURE — 88112 CYTOPATH CELL ENHANCE TECH: CPT | Mod: 26,59,, | Performed by: PATHOLOGY

## 2022-05-06 RX ORDER — ONDANSETRON 2 MG/ML
INJECTION INTRAMUSCULAR; INTRAVENOUS
Status: DISCONTINUED | OUTPATIENT
Start: 2022-05-06 | End: 2022-05-06

## 2022-05-06 RX ORDER — ONDANSETRON 2 MG/ML
4 INJECTION INTRAMUSCULAR; INTRAVENOUS DAILY PRN
Status: DISCONTINUED | OUTPATIENT
Start: 2022-05-06 | End: 2022-05-06 | Stop reason: HOSPADM

## 2022-05-06 RX ORDER — MIDAZOLAM HYDROCHLORIDE 1 MG/ML
INJECTION, SOLUTION INTRAMUSCULAR; INTRAVENOUS
Status: DISCONTINUED | OUTPATIENT
Start: 2022-05-06 | End: 2022-05-06

## 2022-05-06 RX ORDER — DEXAMETHASONE SODIUM PHOSPHATE 4 MG/ML
INJECTION, SOLUTION INTRA-ARTICULAR; INTRALESIONAL; INTRAMUSCULAR; INTRAVENOUS; SOFT TISSUE
Status: DISCONTINUED | OUTPATIENT
Start: 2022-05-06 | End: 2022-05-06

## 2022-05-06 RX ORDER — LIDOCAINE HYDROCHLORIDE 20 MG/ML
INJECTION INTRAVENOUS
Status: DISCONTINUED | OUTPATIENT
Start: 2022-05-06 | End: 2022-05-06

## 2022-05-06 RX ORDER — LIDOCAINE HYDROCHLORIDE 10 MG/ML
INJECTION, SOLUTION EPIDURAL; INFILTRATION; INTRACAUDAL; PERINEURAL
Status: DISCONTINUED | OUTPATIENT
Start: 2022-05-06 | End: 2022-05-06 | Stop reason: HOSPADM

## 2022-05-06 RX ORDER — NEOSTIGMINE METHYLSULFATE 0.5 MG/ML
INJECTION, SOLUTION INTRAVENOUS
Status: DISCONTINUED | OUTPATIENT
Start: 2022-05-06 | End: 2022-05-06

## 2022-05-06 RX ORDER — SODIUM CHLORIDE 0.9 % (FLUSH) 0.9 %
10 SYRINGE (ML) INJECTION
Status: DISCONTINUED | OUTPATIENT
Start: 2022-05-06 | End: 2022-05-06 | Stop reason: HOSPADM

## 2022-05-06 RX ORDER — PHENYLEPHRINE HYDROCHLORIDE 10 MG/ML
INJECTION INTRAVENOUS
Status: DISCONTINUED | OUTPATIENT
Start: 2022-05-06 | End: 2022-05-06

## 2022-05-06 RX ORDER — EPHEDRINE SULFATE 50 MG/ML
INJECTION, SOLUTION INTRAVENOUS
Status: DISCONTINUED | OUTPATIENT
Start: 2022-05-06 | End: 2022-05-06

## 2022-05-06 RX ORDER — KETAMINE HCL IN 0.9 % NACL 50 MG/5 ML
SYRINGE (ML) INTRAVENOUS
Status: DISCONTINUED | OUTPATIENT
Start: 2022-05-06 | End: 2022-05-06

## 2022-05-06 RX ORDER — FENTANYL CITRATE 50 UG/ML
25 INJECTION, SOLUTION INTRAMUSCULAR; INTRAVENOUS EVERY 5 MIN PRN
Status: DISCONTINUED | OUTPATIENT
Start: 2022-05-06 | End: 2022-05-06 | Stop reason: HOSPADM

## 2022-05-06 RX ORDER — FENTANYL CITRATE 50 UG/ML
INJECTION, SOLUTION INTRAMUSCULAR; INTRAVENOUS
Status: DISCONTINUED | OUTPATIENT
Start: 2022-05-06 | End: 2022-05-06

## 2022-05-06 RX ORDER — ROCURONIUM BROMIDE 10 MG/ML
INJECTION, SOLUTION INTRAVENOUS
Status: DISCONTINUED | OUTPATIENT
Start: 2022-05-06 | End: 2022-05-06

## 2022-05-06 RX ORDER — PROPOFOL 10 MG/ML
VIAL (ML) INTRAVENOUS CONTINUOUS PRN
Status: DISCONTINUED | OUTPATIENT
Start: 2022-05-06 | End: 2022-05-06

## 2022-05-06 RX ORDER — PROPOFOL 10 MG/ML
VIAL (ML) INTRAVENOUS
Status: DISCONTINUED | OUTPATIENT
Start: 2022-05-06 | End: 2022-05-06

## 2022-05-06 RX ORDER — HYDROMORPHONE HYDROCHLORIDE 1 MG/ML
0.2 INJECTION, SOLUTION INTRAMUSCULAR; INTRAVENOUS; SUBCUTANEOUS EVERY 5 MIN PRN
Status: DISCONTINUED | OUTPATIENT
Start: 2022-05-06 | End: 2022-05-06 | Stop reason: HOSPADM

## 2022-05-06 RX ADMIN — EPHEDRINE SULFATE 5 MG: 50 INJECTION INTRAVENOUS at 08:05

## 2022-05-06 RX ADMIN — PHENYLEPHRINE HYDROCHLORIDE 100 MCG: 10 INJECTION INTRAVENOUS at 08:05

## 2022-05-06 RX ADMIN — SODIUM CHLORIDE, SODIUM GLUCONATE, SODIUM ACETATE, POTASSIUM CHLORIDE, MAGNESIUM CHLORIDE, SODIUM PHOSPHATE, DIBASIC, AND POTASSIUM PHOSPHATE: .53; .5; .37; .037; .03; .012; .00082 INJECTION, SOLUTION INTRAVENOUS at 08:05

## 2022-05-06 RX ADMIN — FENTANYL CITRATE 100 MCG: 50 INJECTION, SOLUTION INTRAMUSCULAR; INTRAVENOUS at 08:05

## 2022-05-06 RX ADMIN — PROPOFOL 200 MG: 10 INJECTION, EMULSION INTRAVENOUS at 08:05

## 2022-05-06 RX ADMIN — PROPOFOL 30 MG: 10 INJECTION, EMULSION INTRAVENOUS at 07:05

## 2022-05-06 RX ADMIN — SODIUM CHLORIDE: 0.9 INJECTION, SOLUTION INTRAVENOUS at 08:05

## 2022-05-06 RX ADMIN — Medication 10 MG: at 09:05

## 2022-05-06 RX ADMIN — PHENYLEPHRINE HYDROCHLORIDE 200 MCG: 10 INJECTION INTRAVENOUS at 08:05

## 2022-05-06 RX ADMIN — ONDANSETRON HYDROCHLORIDE 4 MG: 2 INJECTION INTRAMUSCULAR; INTRAVENOUS at 09:05

## 2022-05-06 RX ADMIN — DEXAMETHASONE SODIUM PHOSPHATE 8 MG: 4 INJECTION, SOLUTION INTRAMUSCULAR; INTRAVENOUS at 08:05

## 2022-05-06 RX ADMIN — PROPOFOL 30 MG: 10 INJECTION, EMULSION INTRAVENOUS at 09:05

## 2022-05-06 RX ADMIN — LIDOCAINE HYDROCHLORIDE 100 MG: 20 INJECTION, SOLUTION INTRAVENOUS at 08:05

## 2022-05-06 RX ADMIN — Medication 150 MCG/KG/MIN: at 08:05

## 2022-05-06 RX ADMIN — MIDAZOLAM HYDROCHLORIDE 2 MG: 1 INJECTION, SOLUTION INTRAMUSCULAR; INTRAVENOUS at 08:05

## 2022-05-06 RX ADMIN — NEOSTIGMINE METHYLSULFATE 4 MG: 0.5 INJECTION INTRAVENOUS at 09:05

## 2022-05-06 RX ADMIN — ROCURONIUM BROMIDE 10 MG: 10 INJECTION, SOLUTION INTRAVENOUS at 08:05

## 2022-05-06 RX ADMIN — GLYCOPYRROLATE 0.4 MG: 0.2 INJECTION, SOLUTION INTRAMUSCULAR; INTRAVITREAL at 09:05

## 2022-05-06 RX ADMIN — ROCURONIUM BROMIDE 50 MG: 10 INJECTION, SOLUTION INTRAVENOUS at 08:05

## 2022-05-06 NOTE — INTERVAL H&P NOTE
The patient has been examined and the H&P has been reviewed:    I concur with the findings and no changes have occurred since H&P was written.    Procedure risks, benefits and alternative options discussed and understood by patient/family.    Consented in clinic. No Change. All questions answered. Plan for robotic + EBUS.     Target RLL Nodule. No pathologically enlarged LNs on imaging. NM PET negative.     Pre-op diagnosis- Pulmonary nodule.       Chava Mcdonald M.D.   [facility]

## 2022-05-06 NOTE — DISCHARGE SUMMARY
[facility] - Surgery (2nd Fl)  Discharge Note  Short Stay    Procedure(s) (LRB):  ROBOTIC BRONCHOSCOPY with fluro   (N/A)     HOSPITAL COURSE:   Bronchoscopy completed- RLL endobronchial lesion noted. Occludes posterior and lateral segments.      Robotic completed.- FNA x 5 + endobronchial biopsy x 6 completed.   Direct endobronchial biopsy with standard forceps completed x 4   BAL RLL completed- send for cyto      EBUS completed- Station 11L/4L/4R have no pathologically enlarged LNs   Station 7 1.3cm- FNA x 3   Station 11R 1CM- FNA x 4      All bleeding controlled. Family updated post procedure. No intra-procedural complications    OUTCOME: Patient tolerated treatment/procedure well without complication and is now ready for discharge.     Post CXR- No PTX. No complications.     DISPOSITION: Home or Self Care    FINAL DIAGNOSIS:  Pulmonary nodule.     FOLLOWUP: In clinic as scheduled. I will call in the interim when results become available.     DISCHARGE INSTRUCTIONS: Written and verbal instructions provided.     TIME SPENT ON DISCHARGE: 25 minutes      Chava Mcdonald M.D.   [facility]

## 2022-05-06 NOTE — ANESTHESIA PROCEDURE NOTES
Intubation    Date/Time: 5/6/2022 8:14 AM  Performed by: Keshawn Melendez CRNA  Authorized by: Sam Liriano MD     Intubation:     Induction:  Intravenous    Intubated:  Postinduction    Mask Ventilation:  Easy with oral airway    Attempts:  1    Attempted By:  CRNA    Method of Intubation:  Video laryngoscopy    Blade:  Sharif 3    Laryngeal View Grade comment:  Good view with Sharif    Difficult Airway Encountered?: No      Complications:  None    Airway Device:  Oral endotracheal tube    Airway Device Size:  9.0    Style/Cuff Inflation:  Cuffed    Inflation Amount (mL):  7    Tube secured:  22    Secured at:  The lips    Placement Verified By:  Capnometry    Complicating Factors:  Short neck    Findings Post-Intubation:  BS equal bilateral and atraumatic/condition of teeth unchanged

## 2022-05-06 NOTE — BRIEF OP NOTE
Bronchoscopy completed- RLL endobronchial lesion noted. Occludes posterior and lateral segments.     Robotic completed.- FNA x 5 + endobronchial biopsy x 6 completed.   Direct endobronchial biopsy with standard forceps completed x 4   BAL RLL completed- send for cyto     EBUS completed- Station 11L/4L/4R have no pathologically enlarged LNs   Station 7 1.3cm- FNA x 3   Station 11R 1CM- FNA x 4     All bleeding controlled. Family updated post procedure. No intra-procedural complications. To recovery with anesthesia. CXR pending.     Full dictation to follow.       Chava Mcdnoald M.D.  [facility]

## 2022-05-06 NOTE — ANESTHESIA POSTPROCEDURE EVALUATION
Anesthesia Post Evaluation    Patient: Justus Shaikh    Procedure(s) Performed: Procedure(s) (LRB):  ROBOTIC BRONCHOSCOPY with fluro   (N/A)    Final Anesthesia Type: general      Patient location during evaluation: PACU  Patient participation: Yes- Able to Participate  Level of consciousness: awake and alert  Post-procedure vital signs: reviewed and stable  Pain management: adequate  Airway patency: patent    PONV status at discharge: No PONV  Anesthetic complications: no      Cardiovascular status: blood pressure returned to baseline  Respiratory status: unassisted and spontaneous ventilation  Hydration status: euvolemic  Follow-up not needed.          Vitals Value Taken Time   /64 05/06/22 1110   Temp 36.7 °C (98.1 °F) 05/06/22 1007   Pulse 79 05/06/22 1114   Resp 20 05/06/22 1113   SpO2 93 % 05/06/22 1114   Vitals shown include unvalidated device data.      No case tracking events are documented in the log.      Pain/Maria G Score: Maria G Score: 10 (5/6/2022 11:10 AM)

## 2022-05-06 NOTE — TRANSFER OF CARE
"Anesthesia Transfer of Care Note    Patient: Justus Shaikh    Procedure(s) Performed: Procedure(s) (LRB):  ROBOTIC BRONCHOSCOPY with fluro   (N/A)    Patient location: PACU    Anesthesia Type: general    Transport from OR: Transported from OR on 6-10 L/min O2 by face mask with adequate spontaneous ventilation    Post pain: adequate analgesia    Post assessment: no apparent anesthetic complications and tolerated procedure well    Post vital signs: stable    Level of consciousness: sedated    Nausea/Vomiting: no nausea/vomiting    Complications: none    Transfer of care protocol was followed      Last vitals:   Visit Vitals  /81 (BP Location: Left arm, Patient Position: Lying)   Pulse 84   Temp 36.8 °C (98.2 °F) (Oral)   Resp 18   Ht 5' 10" (1.778 m)   Wt 98 kg (216 lb)   SpO2 95%   BMI 30.99 kg/m²     "

## 2022-05-06 NOTE — ANESTHESIA PREPROCEDURE EVALUATION
05/06/2022  Justus Shaikh is a 62 y.o., male.scheduled for robotic bronchoscopy. History of HTN      Pre-op Assessment    I have reviewed the Patient Summary Reports.     I have reviewed the Nursing Notes. I have reviewed the NPO Status.   I have reviewed the Medications.     Review of Systems  Anesthesia Hx:   Denies Personal Hx of Anesthesia complications.   Cardiovascular:   Hypertension Denies MI.  Denies CAD.    Denies Dysrhythmias.     Pulmonary:   Denies COPD.  Denies Asthma.  Denies Shortness of breath.  Denies Sleep Apnea.    Renal/:   Denies Chronic Renal Disease.     Hepatic/GI:   Denies Liver Disease.    Neurological:   Denies TIA. Denies CVA. Denies Seizures.    Endocrine:   Denies Diabetes. Denies Hypothyroidism. Denies Hyperthyroidism.        Physical Exam  General: Well nourished, Cooperative, Alert and Oriented    Airway:  Mallampati: I   Mouth Opening: Normal  TM Distance: Normal  Tongue: Normal  Neck ROM: Normal ROM    Dental:  Intact        Anesthesia Plan  Type of Anesthesia, risks & benefits discussed:    Anesthesia Type: Gen ETT  Intra-op Monitoring Plan: Standard ASA Monitors  Post Op Pain Control Plan: multimodal analgesia  Induction:  IV  Airway Plan: Direct, Post-Induction  Informed Consent: Informed consent signed with the Patient and all parties understand the risks and agree with anesthesia plan.  All questions answered.   ASA Score: 2    Ready For Surgery From Anesthesia Perspective.     .

## 2022-05-12 ENCOUNTER — TELEPHONE (OUTPATIENT)
Dept: HEMATOLOGY/ONCOLOGY | Facility: CLINIC | Age: 62
End: 2022-05-12
Payer: COMMERCIAL

## 2022-05-12 DIAGNOSIS — D3A.090 CARCINOID TUMOR OF RIGHT LUNG: Primary | ICD-10-CM

## 2022-05-12 LAB
ADEQUACY: ABNORMAL
COMMENT: ABNORMAL
FINAL PATHOLOGIC DIAGNOSIS: ABNORMAL
Lab: ABNORMAL
MICROSCOPIC EXAM: ABNORMAL

## 2022-05-12 NOTE — NURSING
Spoke with Mr. Shaikh.  Patient notified of the scheduled PFTs and 6MW on 05/19/22.  Also notified of the consultation appointment with Dr. Arriola and with Dr. Saeed on 05/20/22.  Discussed the location and parking for all appointments.  Briefly described the role of the Nurse Navigator.  Provided my direct number.  Will follow-up at clinic appointment.    Oncology Navigation   Intake  Date of Diagnosis: 5/12/2022  Cancer Type: Thoracic  Internal / External Referral: Internal  Referral Source: Tamara  Date of Referral: 5/12/2022  Initial Nurse Navigator Contact: 5/12/2022  Referral to Initial Contact Timeline (days): 0  Date Worked: 5/12/2022  First Appointment Available: 5/20/2022  Appointment Date: 5/20/2022  First Available Date vs. Scheduled Date (days): 0  Multiple appointments: Yes  Reason if booked > 7 days after scheduling: Specific provider / access     Treatment  Current Status: Staging work-up    Surgical Oncologist: Darlin    Medical Oncologist: Flako  Consult Date: 5/20/2022       Procedures: Other  Other Schedule Date: 5/19/2022 (PFTs 6MW)             Support Systems: Spouse/significant other     Acuity   Needed: No  Support: Patient reports adequate support system  Verbalizes Financial Concerns: No  Transportation: Adequate transportation for treatment  History of noncompliance/frequent no shows and cancellations: No  Navigation Acuity: 0     Follow Up  No follow-ups on file.

## 2022-05-12 NOTE — PROGRESS NOTES
Pathology reviewed. Robotic + carcinoid. EBUS- LNs negative. Never smoker. Arrange PFTs + 6MWT. Placed referral to oncology and thoracic surgery. I discussed results with him by phone.     Chava Mcdonald M.D.   [facility]

## 2022-05-19 ENCOUNTER — HOSPITAL ENCOUNTER (OUTPATIENT)
Dept: PULMONOLOGY | Facility: CLINIC | Age: 62
Discharge: HOME | End: 2022-05-19
Payer: COMMERCIAL

## 2022-05-19 VITALS — HEIGHT: 69 IN | WEIGHT: 211.88 LBS | BODY MASS INDEX: 31.38 KG/M2

## 2022-05-19 DIAGNOSIS — D3A.090 CARCINOID TUMOR OF RIGHT LUNG: ICD-10-CM

## 2022-05-19 PROCEDURE — 94729 PR C02/MEMBANE DIFFUSE CAPACITY: ICD-10-PCS | Mod: S$GLB,,, | Performed by: INTERNAL MEDICINE

## 2022-05-19 PROCEDURE — 94060 EVALUATION OF WHEEZING: CPT | Mod: S$GLB,,, | Performed by: INTERNAL MEDICINE

## 2022-05-19 PROCEDURE — 94727 GAS DIL/WSHOT DETER LNG VOL: CPT | Mod: S$GLB,,, | Performed by: INTERNAL MEDICINE

## 2022-05-19 PROCEDURE — 94729 DIFFUSING CAPACITY: CPT | Mod: S$GLB,,, | Performed by: INTERNAL MEDICINE

## 2022-05-19 PROCEDURE — 94618 PULMONARY STRESS TESTING: ICD-10-PCS | Mod: S$GLB,,, | Performed by: INTERNAL MEDICINE

## 2022-05-19 PROCEDURE — 94727 PR PULM FUNCTION TEST BY GAS: ICD-10-PCS | Mod: S$GLB,,, | Performed by: INTERNAL MEDICINE

## 2022-05-19 PROCEDURE — 94618 PULMONARY STRESS TESTING: CPT | Mod: S$GLB,,, | Performed by: INTERNAL MEDICINE

## 2022-05-19 PROCEDURE — 94060 PR EVAL OF BRONCHOSPASM: ICD-10-PCS | Mod: S$GLB,,, | Performed by: INTERNAL MEDICINE

## 2022-05-19 NOTE — PROGRESS NOTES
History & Physical    SUBJECTIVE:     History of Present Illness:  62 year old male with HTN, HLD, Gilbert's Disease, BPH and newly diagnosed typical carcinoid of right lower lobe. 2.5cm pulmonary mass found incidentally on calcium scoring CT in March 2022. Minimal uptake on FDG PET. Referred to Pulmonology and underwent a robotic bronchoscopy with EBUS on 5/6/22.  A partially obstructing mass was found proximally, at the orifice in the lateral basal segment of the right lower lobe and in the posterior basal segment of the right lower lobe. Pathology resulted as typical carcinoid. Levels 7 and 11 were negative for malignancy.     Smokes cigars. 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  COVID positive in December 2020, asymptomatic.   PSH of right rotator cuff, right hand and right knee procedures, left shoulder arthroscopy     Chief Complaint   Patient presents with    Consult       Review of patient's allergies indicates:   Allergen Reactions    Pseudoephedrine hcl        Current Outpatient Medications   Medication Sig Dispense Refill    aspirin (ECOTRIN) 81 MG EC tablet Take 81 mg by mouth once daily.      aspirin 162.5 mg Cp24 aspirin Take No date recorded No form recorded No frequency recorded No route recorded No set duration recorded No set duration amount recorded active No dosage strength recorded No dosage strength units of measure recorded      cefUROXime (CEFTIN) 250 MG tablet Take 250 mg by mouth 2 (two) times daily.      dexAMETHasone sodium phos, PF, 10 mg/mL Kit by NOT APPLICABLE route.      folic acid/multivit-min/lutein (CENTRUM SILVER ORAL) Take by mouth.      glucosamine HCl 1,500 mg Tab Take by mouth.      hyaluronate sodium (HYALURONIC ACID, SODIUM, ORAL) Take by mouth.      hydroCHLOROthiazide (MICROZIDE) 12.5 mg capsule hydrochlorothiazide Take No date recorded No form recorded No frequency recorded No route recorded No set  duration recorded No set duration amount recorded active No dosage strength recorded No dosage strength units of measure recorded      irbesartan-hydrochlorothiazide (AVALIDE) 300-12.5 mg per tablet Take 1 tablet by mouth once daily. 90 tablet 3    minoxidiL (LONITEN) 2.5 MG tablet Take 1.25-2.5 mg by mouth nightly.      omega 3-dha-epa-fish oil-krill 339 mg-314 mg- 500 mg Cap Take by mouth.      rosuvastatin (CRESTOR) 40 MG Tab Take 1 tablet (40 mg total) by mouth every evening. 90 tablet 3    tadalafiL (CIALIS) 10 MG tablet tadalafil Take No date recorded No form recorded No frequency recorded No route recorded No set duration recorded No set duration amount recorded active No dosage strength recorded No dosage strength units of measure recorded      tadalafiL (CIALIS) 5 MG tablet Take 5 mg by mouth daily as needed for Erectile Dysfunction.      LORazepam (ATIVAN) 1 MG tablet Take 1 tablet (1 mg total) by mouth daily as needed for Anxiety. 30 tablet 0     No current facility-administered medications for this visit.       No past medical history on file.  Past Surgical History:   Procedure Laterality Date    HAND SURGERY Right     KNEE SURGERY      ROBOTIC BRONCHOSCOPY N/A 5/6/2022    Procedure: ROBOTIC BRONCHOSCOPY with fluro  ;  Surgeon: Chava Mcdonald MD;  Location: [facility] OR [address] [place] [address];  Service: Pulmonary;  Laterality: N/A;    ROTATOR CUFF REPAIR Right      No family history on file.  Social History     Tobacco Use    Smoking status: Light Tobacco Smoker    Smokeless tobacco: Never Used   Substance Use Topics    Alcohol use: Yes        Review of Systems:  Review of Systems   Constitutional: Negative for activity change and appetite change.   Respiratory: Negative for shortness of breath.    Cardiovascular: Negative for chest pain and palpitations.   Gastrointestinal: Negative for abdominal pain.   Genitourinary: Negative for difficulty urinating.   Musculoskeletal: Positive for arthralgias.  "  Skin: Negative for color change and rash.   Neurological: Negative for dizziness and syncope.   Psychiatric/Behavioral: Negative for agitation. The patient is not nervous/anxious.        OBJECTIVE:     Vital Signs (Most Recent)  Pulse: 73 (05/20/22 0933)  BP: (!) 146/84 (05/20/22 0933)  SpO2: 95 % (05/20/22 0933)  5' 10" (1.778 m)  96.2 kg (212 lb)     Physical Exam:  Physical Exam  Constitutional:       Appearance: Normal appearance.   HENT:      Head: Atraumatic.   Cardiovascular:      Rate and Rhythm: Normal rate and regular rhythm.      Pulses: Normal pulses.      Heart sounds: Normal heart sounds.   Pulmonary:      Effort: Pulmonary effort is normal.      Breath sounds: Normal breath sounds.   Musculoskeletal:         General: Normal range of motion.      Cervical back: Normal range of motion and neck supple.      Right lower leg: No edema.      Left lower leg: No edema.   Skin:     General: Skin is warm and dry.   Neurological:      General: No focal deficit present.      Mental Status: He is alert and oriented to person, place, and time.   Psychiatric:         Mood and Affect: Mood normal.         Behavior: Behavior normal.         Diagnostic Results:    3/4/22- CT Chest with Contrast:  There is a 2.5 x 2.0 cm heterogeneously enhancing nodule in the infrahilar region on the right.  Further evaluation with PET-CT may be helpful but please note that if this lesion is a carcinoid tumor that it is possible that this lesion may demonstrate no uptake.  Endobronchial tissue sampling is also offered for consideration.  Percutaneous biopsy of this lesion would be very difficult as there are numerous large vessel surrounding the lesion.  There is opacification of multiple bronchi inferior to this lesion likely secondary to postobstructive changes  The aorta is unremarkable in appearance. There is no pericardial effusion.  No enlarged mediastinal, hilar or axillary lymph nodes are identified.  The visualized upper " abdomen is unremarkable in appearance.  No suspicious appearing osseus abnormalities are identified.    3/31/22- FDG PET:  Non FDG avid infrahilar right lower lobe pulmonary nodule with associated bronchial obstruction.  Lack of significant FDG avidity and endobronchial association suggest potential carcinoid tumor.  Tissue sampling would be required for definitive diagnosis.      5/19/22- PFTs:  FEV1- 2.9 88%  DLCO- 26 95%      ASSESSMENT/PLAN:     62 year old male with HTN, HLD, Gilbert's Disease, BPH and newly diagnosed typical carcinoid of right lower lobe.    PLAN:Plan      CuPET to evaluate for distant disease. DSE for pre-operative clearance. Knee pain limiting ability for treadmill stress.  Patient is returning from family vacation on 6/25 and would like to schedule surgery following which is reasonable given pathology.    Appropriate patient education regarding the cassandra-operative period as well as intraoperative details were discussed. Risks, including but not limited to, bleeding, infection, pain and anesthetic complication were discussed. Patient was given the opportunity to ask questions and to have those questions answered to their satisfaction. Patient verbalized understanding to both procedure and associated risks. Consent was obtained.

## 2022-05-20 ENCOUNTER — DOCUMENTATION ONLY (OUTPATIENT)
Dept: HEMATOLOGY/ONCOLOGY | Facility: CLINIC | Age: 62
End: 2022-05-20

## 2022-05-20 ENCOUNTER — OFFICE VISIT (OUTPATIENT)
Dept: CARDIOTHORACIC SURGERY | Facility: CLINIC | Age: 62
End: 2022-05-20
Payer: COMMERCIAL

## 2022-05-20 ENCOUNTER — OFFICE VISIT (OUTPATIENT)
Dept: HEMATOLOGY/ONCOLOGY | Facility: CLINIC | Age: 62
End: 2022-05-20
Payer: COMMERCIAL

## 2022-05-20 VITALS
WEIGHT: 212.94 LBS | TEMPERATURE: 98 F | HEART RATE: 73 BPM | SYSTOLIC BLOOD PRESSURE: 146 MMHG | BODY MASS INDEX: 30.49 KG/M2 | RESPIRATION RATE: 18 BRPM | OXYGEN SATURATION: 95 % | HEART RATE: 73 BPM | HEIGHT: 70 IN | SYSTOLIC BLOOD PRESSURE: 146 MMHG | BODY MASS INDEX: 30.35 KG/M2 | DIASTOLIC BLOOD PRESSURE: 84 MMHG | WEIGHT: 212 LBS | OXYGEN SATURATION: 95 % | HEIGHT: 70 IN | DIASTOLIC BLOOD PRESSURE: 84 MMHG

## 2022-05-20 DIAGNOSIS — E78.5 HYPERLIPIDEMIA, UNSPECIFIED HYPERLIPIDEMIA TYPE: ICD-10-CM

## 2022-05-20 DIAGNOSIS — I10 HYPERTENSION, UNSPECIFIED TYPE: ICD-10-CM

## 2022-05-20 DIAGNOSIS — D3A.090 CARCINOID TUMOR OF RIGHT LUNG: Primary | ICD-10-CM

## 2022-05-20 DIAGNOSIS — D3A.090 CARCINOID TUMOR OF RIGHT LUNG: ICD-10-CM

## 2022-05-20 PROCEDURE — 99203 OFFICE O/P NEW LOW 30 MIN: CPT | Mod: S$GLB,,, | Performed by: INTERNAL MEDICINE

## 2022-05-20 PROCEDURE — 99999 PR PBB SHADOW E&M-EST. PATIENT-LVL V: ICD-10-PCS | Mod: PBBFAC,,, | Performed by: THORACIC SURGERY (CARDIOTHORACIC VASCULAR SURGERY)

## 2022-05-20 PROCEDURE — 99203 PR OFFICE/OUTPT VISIT, NEW, LEVL III, 30-44 MIN: ICD-10-PCS | Mod: S$GLB,,, | Performed by: INTERNAL MEDICINE

## 2022-05-20 PROCEDURE — 99205 OFFICE O/P NEW HI 60 MIN: CPT | Mod: S$GLB,,, | Performed by: THORACIC SURGERY (CARDIOTHORACIC VASCULAR SURGERY)

## 2022-05-20 PROCEDURE — 3079F PR MOST RECENT DIASTOLIC BLOOD PRESSURE 80-89 MM HG: ICD-10-PCS | Mod: CPTII,S$GLB,, | Performed by: THORACIC SURGERY (CARDIOTHORACIC VASCULAR SURGERY)

## 2022-05-20 PROCEDURE — 99999 PR PBB SHADOW E&M-EST. PATIENT-LVL IV: ICD-10-PCS | Mod: PBBFAC,,, | Performed by: INTERNAL MEDICINE

## 2022-05-20 PROCEDURE — 3077F PR MOST RECENT SYSTOLIC BLOOD PRESSURE >= 140 MM HG: ICD-10-PCS | Mod: CPTII,S$GLB,, | Performed by: THORACIC SURGERY (CARDIOTHORACIC VASCULAR SURGERY)

## 2022-05-20 PROCEDURE — 3077F PR MOST RECENT SYSTOLIC BLOOD PRESSURE >= 140 MM HG: ICD-10-PCS | Mod: CPTII,S$GLB,, | Performed by: INTERNAL MEDICINE

## 2022-05-20 PROCEDURE — 3079F DIAST BP 80-89 MM HG: CPT | Mod: CPTII,S$GLB,, | Performed by: THORACIC SURGERY (CARDIOTHORACIC VASCULAR SURGERY)

## 2022-05-20 PROCEDURE — 3077F SYST BP >= 140 MM HG: CPT | Mod: CPTII,S$GLB,, | Performed by: THORACIC SURGERY (CARDIOTHORACIC VASCULAR SURGERY)

## 2022-05-20 PROCEDURE — 99999 PR PBB SHADOW E&M-EST. PATIENT-LVL V: CPT | Mod: PBBFAC,,, | Performed by: THORACIC SURGERY (CARDIOTHORACIC VASCULAR SURGERY)

## 2022-05-20 PROCEDURE — 99205 PR OFFICE/OUTPT VISIT, NEW, LEVL V, 60-74 MIN: ICD-10-PCS | Mod: S$GLB,,, | Performed by: THORACIC SURGERY (CARDIOTHORACIC VASCULAR SURGERY)

## 2022-05-20 PROCEDURE — 3077F SYST BP >= 140 MM HG: CPT | Mod: CPTII,S$GLB,, | Performed by: INTERNAL MEDICINE

## 2022-05-20 PROCEDURE — 1159F MED LIST DOCD IN RCRD: CPT | Mod: CPTII,S$GLB,, | Performed by: THORACIC SURGERY (CARDIOTHORACIC VASCULAR SURGERY)

## 2022-05-20 PROCEDURE — 3079F PR MOST RECENT DIASTOLIC BLOOD PRESSURE 80-89 MM HG: ICD-10-PCS | Mod: CPTII,S$GLB,, | Performed by: INTERNAL MEDICINE

## 2022-05-20 PROCEDURE — 3008F PR BODY MASS INDEX (BMI) DOCUMENTED: ICD-10-PCS | Mod: CPTII,S$GLB,, | Performed by: THORACIC SURGERY (CARDIOTHORACIC VASCULAR SURGERY)

## 2022-05-20 PROCEDURE — 3008F BODY MASS INDEX DOCD: CPT | Mod: CPTII,S$GLB,, | Performed by: INTERNAL MEDICINE

## 2022-05-20 PROCEDURE — 3008F BODY MASS INDEX DOCD: CPT | Mod: CPTII,S$GLB,, | Performed by: THORACIC SURGERY (CARDIOTHORACIC VASCULAR SURGERY)

## 2022-05-20 PROCEDURE — 3008F PR BODY MASS INDEX (BMI) DOCUMENTED: ICD-10-PCS | Mod: CPTII,S$GLB,, | Performed by: INTERNAL MEDICINE

## 2022-05-20 PROCEDURE — 1159F PR MEDICATION LIST DOCUMENTED IN MEDICAL RECORD: ICD-10-PCS | Mod: CPTII,S$GLB,, | Performed by: THORACIC SURGERY (CARDIOTHORACIC VASCULAR SURGERY)

## 2022-05-20 PROCEDURE — 3079F DIAST BP 80-89 MM HG: CPT | Mod: CPTII,S$GLB,, | Performed by: INTERNAL MEDICINE

## 2022-05-20 PROCEDURE — 99999 PR PBB SHADOW E&M-EST. PATIENT-LVL IV: CPT | Mod: PBBFAC,,, | Performed by: INTERNAL MEDICINE

## 2022-05-20 RX ORDER — TAMARIND SEED/TURMERIC EXTRACT 250 MG
TABLET ORAL
COMMUNITY
End: 2022-09-23

## 2022-05-20 NOTE — PROGRESS NOTES
NEW ONCOLOGY VISIT.     Reason for visit: Newly diagnosed pulmonary carcinoid tumor    HPI:   62 y.o. male who presents for evaluation and treatment recommendations regarding an incidentally found tumor that was biopsied consistent with a carcinoid.  Only lesion on PETCT was non-hypermetabolic lesion in lung.  He denies any symptoms of carcinoid syndrome such as diarrhea or flushing.  He does have some congestion since his bronchoscopy, but no fevers or chills.    Practical Problems Physical Problems   : No Appearance: No   Housing: No Bathing / Dressing: No   Insurance / Financial: No Breathing: No    Transportation: No  Changes in Urination: No    Work / School: No  Constipation: No   Treatment Decisions: No  Diarrhea: No     Eating: No    Family Problems Fatigue: No    Dealing with Children: No Feeling Swollen: No    Dealing with Partner: No Fevers: No    Ability to Have Children: No  Getting Around: No    Family Health Issues: No  Indigestion: No     Memory / Concentration: No   Emotional Problems Mouth Sores: No    Depression: No  Nausea: No    Fears: No  Nose Dry / Congested: No    Nervousness: No  Pain: No    Sadness: No Sexual: No    Worry: No Skin Dry / Itchy: No    Loss of Interest in Usual Activities: No Sleep: No     Substance Abuse: No    Spiritual/Religions Concerns Tingling in Hands / Feet: No   Spritual / Jewish Concerns: No         Other Problems            ROS:   Review of Systems   Constitutional: Negative for activity change, chills, fatigue, fever and unexpected weight change.   HENT: Negative for mouth sores, nosebleeds and sore throat.    Respiratory: Positive for cough and chest tightness. Negative for shortness of breath and wheezing.    Cardiovascular: Negative for chest pain, palpitations and leg swelling.   Gastrointestinal: Negative for abdominal distention, abdominal pain and blood in stool.   Endocrine: Negative for cold intolerance and heat intolerance.   Genitourinary:  Negative for dysuria, flank pain and frequency.   Musculoskeletal: Negative for arthralgias, joint swelling and myalgias.   Skin: Negative for color change, rash and wound.   Neurological: Negative for dizziness, light-headedness and headaches.   Hematological: Negative for adenopathy. Does not bruise/bleed easily.        Past Medical History:   No past medical history on file.     Past Surgical History:   Past Surgical History:   Procedure Laterality Date    HAND SURGERY Right     KNEE SURGERY      ROBOTIC BRONCHOSCOPY N/A 5/6/2022    Procedure: ROBOTIC BRONCHOSCOPY with fluro  ;  Surgeon: Chava Mcdonald MD;  Location: [facility] OR [address] [place] [address];  Service: Pulmonary;  Laterality: N/A;    ROTATOR CUFF REPAIR Right         Family History:   No family history on file.     Social History:   Social History     Tobacco Use    Smoking status: Light Tobacco Smoker    Smokeless tobacco: Never Used   Substance Use Topics    Alcohol use: Yes        Allergies:   Review of patient's allergies indicates:   Allergen Reactions    Pseudoephedrine hcl         Medications:   Current Outpatient Medications   Medication Sig Dispense Refill    aspirin (ECOTRIN) 81 MG EC tablet Take 81 mg by mouth once daily.      folic acid/multivit-min/lutein (CENTRUM SILVER ORAL) Take by mouth.      irbesartan-hydrochlorothiazide (AVALIDE) 300-12.5 mg per tablet Take 1 tablet by mouth once daily. 90 tablet 3    minoxidiL (LONITEN) 2.5 MG tablet Take 1.25-2.5 mg by mouth nightly.      omega 3-dha-epa-fish oil-krill 339 mg-314 mg- 500 mg Cap Take by mouth.      rosuvastatin (CRESTOR) 40 MG Tab Take 1 tablet (40 mg total) by mouth every evening. 90 tablet 3    tadalafiL (CIALIS) 10 MG tablet tadalafil Take No date recorded No form recorded No frequency recorded No route recorded No set duration recorded No set duration amount recorded active No dosage strength recorded No dosage strength units of measure recorded      aspirin 162.5 mg Cp24  "aspirin Take No date recorded No form recorded No frequency recorded No route recorded No set duration recorded No set duration amount recorded active No dosage strength recorded No dosage strength units of measure recorded      brompheniramine-pseudoeph-DM (BROMFED DM) 2-30-10 mg/5 mL Syrp Take 5 mLs by mouth every 6 (six) hours as needed.      cefUROXime (CEFTIN) 250 MG tablet Take 250 mg by mouth 2 (two) times daily.      dexAMETHasone sodium phos, PF, 10 mg/mL Kit by NOT APPLICABLE route.      docosahexaenoic acid/epa (FISH OIL ORAL) Take by mouth.      glucosamine HCl 1,500 mg Tab Take by mouth.      guaiFENesin-codeine 100-10 mg/5 ml (TUSSI-ORGANIDIN NR)  mg/5 mL syrup Take 5 mLs by mouth nightly as needed.      hyaluronate sodium (HYALURONIC ACID, SODIUM, ORAL) Take by mouth.      hydroCHLOROthiazide (MICROZIDE) 12.5 mg capsule hydrochlorothiazide Take No date recorded No form recorded No frequency recorded No route recorded No set duration recorded No set duration amount recorded active No dosage strength recorded No dosage strength units of measure recorded      LORazepam (ATIVAN) 1 MG tablet Take 1 tablet (1 mg total) by mouth daily as needed for Anxiety. 30 tablet 0    tadalafiL (CIALIS) 5 MG tablet Take 5 mg by mouth daily as needed for Erectile Dysfunction.       No current facility-administered medications for this visit.        Physical Exam:   BP (!) 146/84 (BP Location: Left arm, Patient Position: Sitting, BP Method: Medium (Automatic))   Pulse 73   Temp 98.2 °F (36.8 °C) (Oral)   Resp 18   Ht 5' 10" (1.778 m)   Wt 96.6 kg (212 lb 15.4 oz)   SpO2 95%   BMI 30.56 kg/m²      ECOG Performance Status: (foot note - ECOG PS provided by Eastern Cooperative Oncology Group) 0 - Asymptomatic    Physical Exam  Constitutional:       Appearance: Normal appearance. He is well-developed.   HENT:      Head: Normocephalic and atraumatic.   Eyes:      Conjunctiva/sclera: Conjunctivae normal.    "   Pupils: Pupils are equal, round, and reactive to light.   Pulmonary:      Effort: Pulmonary effort is normal. No respiratory distress.   Abdominal:      Palpations: Abdomen is soft.      Tenderness: There is no abdominal tenderness.   Musculoskeletal:         General: No swelling. Normal range of motion.      Cervical back: Normal range of motion and neck supple.   Skin:     General: Skin is warm and dry.   Neurological:      General: No focal deficit present.      Mental Status: He is alert and oriented to person, place, and time.   Psychiatric:         Mood and Affect: Mood normal.         Behavior: Behavior normal.           Labs:   No results found for this or any previous visit (from the past 48 hour(s)).     Imaging:  X-Ray Chest AP Portable  Narrative: EXAMINATION:  XR CHEST AP PORTABLE    CLINICAL HISTORY:  pneumothorax eval post robotic bronchoscopy;  Solitary pulmonary nodule    FINDINGS:  Chest one view portable.    Heart size is normal.  No pneumothorax seen.  There is mild bibasal discoid atelectasis.  Impression: No pneumothorax seen.    Electronically signed by: Butch Calix MD  Date:    2022  Time:    10:34  SUR[facility] Surgery Fluoro Usage  See OP Notes for results.     IMPRESSION: See OP Notes for results.     This procedure was auto-finalized by: Virtual Radiologist            Diagnoses:       1. Carcinoid tumor of right lung          Assessment and Plan:         1. Carcinoid tumor of right lun.4 cm and no evidence of carcinoid syndrome.  Plan is for copper PETCT to ensure patient does not have metastatic disease and evaluation for surgical resection with Dr. Saeed.            30 minutes total time spent with patient, 20 minutes were spent face to face with the patient and his family to discuss the disease, natural history, treatment options and survival statistics. Greater than 50% of this time was for counseling.  10 minutes of chart review and coordination of care. I have  provided the patient with an opportunity to ask questions and have all questions answered to his satisfaction.     he will return to clinic as needed, but knows to call in the interim if symptoms change or should a problem arise.    Sam Arriola MD  Medical Oncology   [facility]  [facility]

## 2022-05-20 NOTE — LETTER
[facility] - Thoracic Surgery  [address]  [facility] [address]  Phone: [phone]  Fax: [phone] May 20, 2022          Chava Mcdonald MD  [address]  [facility] [address]    Patient: Justus Shaikh   MR Number: [id number]   YOB: 1960   Date of Visit: 5/20/2022     Dear Dr. Mcdonald:    Thank you for referring Justus Shaikh to me for evaluation. Mr. Shaikh presents for evaluation of obstructing right lower lobe endobronchial typical carcinoid tumor.  I reviewed the chest CT and bronchoscopy images.    The tumor size and location mandate anatomic right lower lobectomy with mediastinal lymph node dissection.  I recommend a robotic approach.  We will schedule Mr. Shaikh for surgery in late June once he returns from family vacation.      If you have questions, please do not hesitate to call me. I look forward to following Justus along with you.    Sincerely,      Osvaldo Saeed MD    BLP/terrance    CC  Isidro Worthington MD

## 2022-05-20 NOTE — PROGRESS NOTES
Met with Mr. Shaikh and wife following his consultation with Thoracic Surgery.  Briefly described the role of the Nurse Navigator.  Discussed some available  resources.  Patient scheduled for PET on May 26th at the [place] location.  Discussed location of test.  Patient scheduled for a DSE on May 25th .  NPO instructions given.  Provided my contact information.

## 2022-05-21 NOTE — PROCEDURES
Justus Shaikh is a 62 y.o.  male patient, who presents for a 6 minute walk test ordered by MD Tamara.  The diagnosis is Shortness of Breath.  The patient's BMI is 31.3 kg/m2.  Predicted distance (lower limit of normal) is 367.25 meters.      Test Results:    The test was completed without stopping.  The total time walked was 360 seconds.  During walking, the patient reported:  No complaints. The patient used no assistive devices  during testing.     5/19/2022---------Distance: 450.19 meters (1477 feet)     O2 Sat % Supplemental Oxygen Heart Rate Blood Pressure Shaheen Scale   Pre-exercise  (Resting) 96 % Room Air 79 bpm 141/85 mmHg 0   During Exercise 95 % Room Air 95 bpm 156/88 mmHg 1   Post-exercise  (Recovery) 96 % Room Air  88 bpm   mmHg       Recovery Time: 89 seconds    Performing nurse/tech: MARISELA Howard      PREVIOUS STUDY:   The patient has not had a previous study.      CLINICAL INTERPRETATION:  Six minute walk distance is 450.19 meters (1477 feet) with very light dyspnea.  During exercise, there was no significant desaturation while breathing room air.  Both blood pressure and heart rate remained stable with walking.  The patient did not report non-pulmonary symptoms during exercise.  No previous study performed.  Based upon age and body mass index, exercise capacity is normal.

## 2022-05-25 ENCOUNTER — HOSPITAL ENCOUNTER (OUTPATIENT)
Dept: CARDIOLOGY | Facility: HOSPITAL | Age: 62
Discharge: HOME | End: 2022-05-25
Attending: PHYSICIAN ASSISTANT
Payer: COMMERCIAL

## 2022-05-25 VITALS — WEIGHT: 210 LBS | HEIGHT: 70 IN | BODY MASS INDEX: 30.06 KG/M2

## 2022-05-25 DIAGNOSIS — I10 HYPERTENSION, UNSPECIFIED TYPE: ICD-10-CM

## 2022-05-25 DIAGNOSIS — E78.5 HYPERLIPIDEMIA, UNSPECIFIED HYPERLIPIDEMIA TYPE: ICD-10-CM

## 2022-05-25 DIAGNOSIS — D3A.090 CARCINOID TUMOR OF RIGHT LUNG: ICD-10-CM

## 2022-05-25 LAB
ASCENDING AORTA: 2.95 CM
BSA FOR ECHO PROCEDURE: 2.17 M2
CV ECHO LV RWT: 0.33 CM
CV STRESS BASE HR: 74 BPM
DIASTOLIC BLOOD PRESSURE: 92 MMHG
DOP CALC LVOT AREA: 4 CM2
DOP CALC LVOT DIAMETER: 2.27 CM
DOP CALC LVOT PEAK VEL: 1.04 M/S
DOP CALC LVOT STROKE VOLUME: 89.03 CM3
DOP CALCLVOT PEAK VEL VTI: 22.01 CM
E WAVE DECELERATION TIME: 159.01 MSEC
E/A RATIO: 0.83
E/E' RATIO: 12.62 M/S
ECHO LV POSTERIOR WALL: 0.77 CM (ref 0.6–1.1)
EJECTION FRACTION: 55 %
FRACTIONAL SHORTENING: 31 % (ref 28–44)
INTERVENTRICULAR SEPTUM: 1.03 CM (ref 0.6–1.1)
IVRT: 97.05 MSEC
LA MAJOR: 5.68 CM
LA MINOR: 5.53 CM
LA WIDTH: 4.3 CM
LEFT ATRIUM SIZE: 4.33 CM
LEFT ATRIUM VOLUME INDEX: 41.6 ML/M2
LEFT ATRIUM VOLUME: 88.69 CM3
LEFT INTERNAL DIMENSION IN SYSTOLE: 3.24 CM (ref 2.1–4)
LEFT VENTRICLE DIASTOLIC VOLUME INDEX: 48.51 ML/M2
LEFT VENTRICLE DIASTOLIC VOLUME: 103.32 ML
LEFT VENTRICLE MASS INDEX: 67 G/M2
LEFT VENTRICLE SYSTOLIC VOLUME INDEX: 19.7 ML/M2
LEFT VENTRICLE SYSTOLIC VOLUME: 42.06 ML
LEFT VENTRICULAR INTERNAL DIMENSION IN DIASTOLE: 4.72 CM (ref 3.5–6)
LEFT VENTRICULAR MASS: 143.72 G
LV LATERAL E/E' RATIO: 13.67 M/S
LV SEPTAL E/E' RATIO: 11.71 M/S
MV A" WAVE DURATION": 9.71 MSEC
MV PEAK A VEL: 0.99 M/S
MV PEAK E VEL: 0.82 M/S
MV STENOSIS PRESSURE HALF TIME: 46.11 MS
MV VALVE AREA P 1/2 METHOD: 4.77 CM2
OHS CV CPX 1 MINUTE RECOVERY HEART RATE: 125 BPM
OHS CV CPX 85 PERCENT MAX PREDICTED HEART RATE MALE: 133
OHS CV CPX ESTIMATED METS: 13
OHS CV CPX MAX PREDICTED HEART RATE: 156
OHS CV CPX PATIENT IS FEMALE: 0
OHS CV CPX PATIENT IS MALE: 1
OHS CV CPX PEAK DIASTOLIC BLOOD PRESSURE: 71 MMHG
OHS CV CPX PEAK HEAR RATE: 151 BPM
OHS CV CPX PEAK RATE PRESSURE PRODUCT: NORMAL
OHS CV CPX PEAK SYSTOLIC BLOOD PRESSURE: 196 MMHG
OHS CV CPX PERCENT MAX PREDICTED HEART RATE ACHIEVED: 97
OHS CV CPX RATE PRESSURE PRODUCT PRESENTING: NORMAL
PISA TR MAX VEL: 2.37 M/S
PULM VEIN S/D RATIO: 1.45
PV PEAK D VEL: 0.4 M/S
PV PEAK S VEL: 0.58 M/S
RA MAJOR: 4.98 CM
RA PRESSURE: 3 MMHG
RA WIDTH: 4.1 CM
RIGHT VENTRICULAR END-DIASTOLIC DIMENSION: 3.57 CM
RV TISSUE DOPPLER FREE WALL SYSTOLIC VELOCITY 1 (APICAL 4 CHAMBER VIEW): 15.97 CM/S
SINUS: 3.64 CM
STJ: 2.73 CM
STRESS ECHO POST EXERCISE DUR MIN: 8 MINUTES
STRESS ECHO POST EXERCISE DUR SEC: 1 SECONDS
SYSTOLIC BLOOD PRESSURE: 154 MMHG
TDI LATERAL: 0.06 M/S
TDI SEPTAL: 0.07 M/S
TDI: 0.07 M/S
TR MAX PG: 22 MMHG
TRICUSPID ANNULAR PLANE SYSTOLIC EXCURSION: 2.57 CM
TV REST PULMONARY ARTERY PRESSURE: 25 MMHG

## 2022-05-25 PROCEDURE — 93351 STRESS TTE COMPLETE: CPT | Mod: 26,,, | Performed by: INTERNAL MEDICINE

## 2022-05-25 PROCEDURE — 93351 STRESS ECHO (CUPID ONLY): ICD-10-PCS | Mod: 26,,, | Performed by: INTERNAL MEDICINE

## 2022-05-25 PROCEDURE — 93351 STRESS TTE COMPLETE: CPT

## 2022-05-27 ENCOUNTER — HOSPITAL ENCOUNTER (OUTPATIENT)
Dept: RADIOLOGY | Facility: HOSPITAL | Age: 62
Discharge: HOME | End: 2022-05-27
Attending: PHYSICIAN ASSISTANT
Payer: COMMERCIAL

## 2022-05-27 DIAGNOSIS — D3A.090 CARCINOID TUMOR OF RIGHT LUNG: ICD-10-CM

## 2022-05-27 PROCEDURE — 78815 PET IMAGE W/CT SKULL-THIGH: CPT | Mod: 26,PS,, | Performed by: RADIOLOGY

## 2022-05-27 PROCEDURE — 78815 NM PET CU64 DOTATATE, SKULL TO MID THIGH: ICD-10-PCS | Mod: 26,PS,, | Performed by: RADIOLOGY

## 2022-05-27 PROCEDURE — 78815 PET IMAGE W/CT SKULL-THIGH: CPT | Mod: TC

## 2022-05-31 ENCOUNTER — TELEPHONE (OUTPATIENT)
Dept: INTERNAL MEDICINE | Facility: CLINIC | Age: 62
End: 2022-05-31
Payer: COMMERCIAL

## 2022-05-31 DIAGNOSIS — R73.01 IMPAIRED FASTING GLUCOSE: ICD-10-CM

## 2022-05-31 DIAGNOSIS — R97.20 ELEVATED PSA: ICD-10-CM

## 2022-05-31 DIAGNOSIS — E78.2 HYPERLIPIDEMIA, MIXED: ICD-10-CM

## 2022-05-31 DIAGNOSIS — I10 ESSENTIAL HYPERTENSION: Primary | ICD-10-CM

## 2022-05-31 NOTE — TELEPHONE ENCOUNTER
----- Message from Karin Baez sent at 5/31/2022 10:15 AM CDT -----  Contact: pt [phone]  Patient has an annual scheduled for 06/09/2022. And is requesting lab order to be sent to [facility] to be completed on Friday.    Please call to schedule once orders have been placed.    Thank You

## 2022-06-06 ENCOUNTER — TELEPHONE (OUTPATIENT)
Dept: INTERNAL MEDICINE | Facility: CLINIC | Age: 62
End: 2022-06-06
Payer: COMMERCIAL

## 2022-06-06 LAB
ALBUMIN: 4.8 G/DL (ref 3.5–5)
ALP SERPL-CCNC: 94 U/L (ref 38–126)
ALT SERPL W P-5'-P-CCNC: 25 U/L (ref 7–56)
ANION GAP SERPL CALC-SCNC: 16.8 MMOL/L (ref 9–18)
AST SERPL-CCNC: 22 U/L (ref 7–40)
BILIRUB SERPL-MCNC: 1.1 MG/DL (ref 0–1.2)
BUN BLD-MCNC: 11 MG/DL (ref 7–21)
BUN/CREAT SERPL: 13 (ref 6–22)
CALC OSMOLALITY: 281 MOSM/KG (ref 275–295)
CALCIUM SERPL-MCNC: 9.6 MG/DL (ref 8.5–10.3)
CHLORIDE SERPL-SCNC: 102 MMOL/L (ref 98–107)
CHOL/HDLC RATIO: 3.44
CHOLEST SERPL-MSCNC: 179 MG/DL (ref 100–200)
CO2 SERPL-SCNC: 27 MMOL/L (ref 21–31)
CREAT SERPL-MCNC: 0.86 MG/DL (ref 0.7–1.2)
EGFR: 106 ML/MIN
GFR: 92 ML/MIN
GLUCOSE SERPL-MCNC: 107 MG/DL (ref 70–100)
HBA1C MFR BLD: 5.3 % (ref 4.5–5.7)
HDLC SERPL-MCNC: 52 MG/DL (ref 40–75)
LDLC SERPL CALC-MCNC: 107 MG/DL (ref 0–130)
POTASSIUM SERPL-SCNC: 4.8 MMOL/L (ref 3.5–5)
PSA FREE MFR SERPL: 13 % (CALC)
PSA FREE SERPL-MCNC: 0.6 NG/ML
PSA SERPL-MCNC: 4.7 NG/ML
SODIUM BLD-SCNC: 141 MMOL/L (ref 135–145)
TOTAL PROTEIN: 7 G/DL (ref 6.3–8.2)
TRIGL SERPL-MCNC: 122 MG/DL (ref 30–150)

## 2022-06-06 NOTE — TELEPHONE ENCOUNTER
----- Message from Elsy Gonzalez sent at 6/6/2022  9:32 AM CDT -----  Contact: [phone]  Pt called to inquire about his PSA in his labs he took on Friday. Pt says he asked the nurse before having his labs drawn if his PSA was included, but when he looks for his result he does not see it on [facility] or [facility]. Please Advise

## 2022-06-09 ENCOUNTER — OFFICE VISIT (OUTPATIENT)
Dept: INTERNAL MEDICINE | Facility: CLINIC | Age: 62
End: 2022-06-09
Payer: COMMERCIAL

## 2022-06-09 VITALS
HEIGHT: 70 IN | DIASTOLIC BLOOD PRESSURE: 84 MMHG | OXYGEN SATURATION: 95 % | WEIGHT: 213.5 LBS | TEMPERATURE: 99 F | BODY MASS INDEX: 30.56 KG/M2 | HEART RATE: 78 BPM | SYSTOLIC BLOOD PRESSURE: 130 MMHG

## 2022-06-09 DIAGNOSIS — N40.0 BENIGN PROSTATIC HYPERPLASIA, UNSPECIFIED WHETHER LOWER URINARY TRACT SYMPTOMS PRESENT: ICD-10-CM

## 2022-06-09 DIAGNOSIS — D3A.090 CARCINOID TUMOR OF RIGHT LUNG: Primary | ICD-10-CM

## 2022-06-09 DIAGNOSIS — E78.2 HYPERLIPIDEMIA, MIXED: ICD-10-CM

## 2022-06-09 DIAGNOSIS — F41.9 ANXIETY: ICD-10-CM

## 2022-06-09 DIAGNOSIS — R97.20 ELEVATED PSA: ICD-10-CM

## 2022-06-09 DIAGNOSIS — I10 ESSENTIAL HYPERTENSION: ICD-10-CM

## 2022-06-09 PROCEDURE — 3075F PR MOST RECENT SYSTOLIC BLOOD PRESS GE 130-139MM HG: ICD-10-PCS | Mod: CPTII,S$GLB,, | Performed by: INTERNAL MEDICINE

## 2022-06-09 PROCEDURE — 1159F PR MEDICATION LIST DOCUMENTED IN MEDICAL RECORD: ICD-10-PCS | Mod: CPTII,S$GLB,, | Performed by: INTERNAL MEDICINE

## 2022-06-09 PROCEDURE — 99999 PR PBB SHADOW E&M-EST. PATIENT-LVL IV: ICD-10-PCS | Mod: PBBFAC,,, | Performed by: INTERNAL MEDICINE

## 2022-06-09 PROCEDURE — 3044F PR MOST RECENT HEMOGLOBIN A1C LEVEL <7.0%: ICD-10-PCS | Mod: CPTII,S$GLB,, | Performed by: INTERNAL MEDICINE

## 2022-06-09 PROCEDURE — 99999 PR PBB SHADOW E&M-EST. PATIENT-LVL IV: CPT | Mod: PBBFAC,,, | Performed by: INTERNAL MEDICINE

## 2022-06-09 PROCEDURE — 3075F SYST BP GE 130 - 139MM HG: CPT | Mod: CPTII,S$GLB,, | Performed by: INTERNAL MEDICINE

## 2022-06-09 PROCEDURE — 99214 OFFICE O/P EST MOD 30 MIN: CPT | Mod: S$GLB,,, | Performed by: INTERNAL MEDICINE

## 2022-06-09 PROCEDURE — 3079F DIAST BP 80-89 MM HG: CPT | Mod: CPTII,S$GLB,, | Performed by: INTERNAL MEDICINE

## 2022-06-09 PROCEDURE — 3008F BODY MASS INDEX DOCD: CPT | Mod: CPTII,S$GLB,, | Performed by: INTERNAL MEDICINE

## 2022-06-09 PROCEDURE — 99214 PR OFFICE/OUTPT VISIT, EST, LEVL IV, 30-39 MIN: ICD-10-PCS | Mod: S$GLB,,, | Performed by: INTERNAL MEDICINE

## 2022-06-09 PROCEDURE — 3079F PR MOST RECENT DIASTOLIC BLOOD PRESSURE 80-89 MM HG: ICD-10-PCS | Mod: CPTII,S$GLB,, | Performed by: INTERNAL MEDICINE

## 2022-06-09 PROCEDURE — 1159F MED LIST DOCD IN RCRD: CPT | Mod: CPTII,S$GLB,, | Performed by: INTERNAL MEDICINE

## 2022-06-09 PROCEDURE — 3008F PR BODY MASS INDEX (BMI) DOCUMENTED: ICD-10-PCS | Mod: CPTII,S$GLB,, | Performed by: INTERNAL MEDICINE

## 2022-06-09 PROCEDURE — 3044F HG A1C LEVEL LT 7.0%: CPT | Mod: CPTII,S$GLB,, | Performed by: INTERNAL MEDICINE

## 2022-06-09 NOTE — PATIENT INSTRUCTIONS
Thank you for your interest in the [facility] Multi Cancer Early Detection test. We are very excited about this clinical trial, the future of early cancer detection, and the overwhelming interest we have received. We have not yet started enrollment for this trial, but are ramping up to do so within the coming month.     Please email [email or web address] for more information and to be added to the list of interested patients.     [facility] is offering this same test for a fee. Please email [email or web address] if you are interested in purchasing.     Thank you for your interest as [facility] strives to improve the health outcomes of our community.

## 2022-06-09 NOTE — PROGRESS NOTES
[facility] Note    Chief Complaint      Chief Complaint   Patient presents with    Annual Exam       History of Present Illness      Justus Shaikh is a 62 y.o. 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  Typical carcinoid tumor of RLL: Seeing Dr. Saeed.  PETCT did not show metastasis and surgical resection planned.  HTN: BP at goal on irbesartan-hctz.  Sees Dr. Leija as needed.  HLD: Controlled on crestor.  LDL 70.  Has been having joint pains which was similar to side effects of lipitor.   BPH, elevated PSA: Sees Dr. Flores.  On cialis daily. Had recent MRI which did not show any cancer. Recent PSA 4.7, 13%.    Gilbert disease: LFTs stable.  Anxiety: Controlled on lorazepam as needed.    Alopecia: Sees dermatology.  On minoxidil currently.  Flu shot UTD.  Td within 10 yrs.  Plan to revaccinate in 2025.  Shingrix discussed.  Pneumonia vaccine due age 63.  COVID UTD.  Cscope Dr. Solano, 4-5 yrs ago, no polyps, 10 yr interval.     Past Medical History:  History reviewed. No pertinent past medical history.    Past Surgical History:   has a past surgical history that includes Rotator cuff repair (Right); Hand surgery (Right); Knee surgery; robotic bronchoscopy (N/A, 05/06/2022); Hernia repair (1961); Tonsillectomy (1963); and Vasectomy (1998).    Family History:  family history includes Arthritis in his father and sister; Asthma in his son; Cancer in his brother; Diabetes in his father; Drug abuse in his brother; Heart disease in his father and mother; Hypertension in his mother; Learning disabilities in his brother and son.     Social History:  Social History     Tobacco Use    Smoking status: Light Tobacco Smoker     Packs/day: 0.00     Years: 10.00     Pack years: 0.00     Types: Cigars    Smokeless tobacco: Never Used   Substance Use Topics    Alcohol use: Yes      Alcohol/week: 10.0 standard drinks     Types: 10 Glasses of wine per week    Drug use: Never       I personally reviewed all past medical, surgical, social and family history.    Review of Systems   Constitutional: Negative for chills, fever and malaise/fatigue.   Respiratory: Negative for shortness of breath.    Cardiovascular: Negative for chest pain.   Gastrointestinal: Negative for constipation, diarrhea, nausea and vomiting.   Skin: Negative for rash.   Neurological: Negative for weakness.   All other systems reviewed and are negative.       Medications:  Outpatient Encounter Medications as of 6/9/2022   Medication Sig Dispense Refill    aspirin (ECOTRIN) 81 MG EC tablet Take 81 mg by mouth once daily.      folic acid/multivit-min/lutein (CENTRUM SILVER ORAL) Take by mouth.      irbesartan-hydrochlorothiazide (AVALIDE) 300-12.5 mg per tablet Take 1 tablet by mouth once daily. 90 tablet 3    minoxidiL (LONITEN) 2.5 MG tablet Take 1.25-2.5 mg by mouth nightly.      omega 3-dha-epa-fish oil-krill 339 mg-314 mg- 500 mg Cap Take by mouth.      rosuvastatin (CRESTOR) 40 MG Tab Take 1 tablet (40 mg total) by mouth every evening. 90 tablet 3    tadalafiL (CIALIS) 5 MG tablet Take 5 mg by mouth daily as needed for Erectile Dysfunction.      aspirin 162.5 mg Cp24 aspirin Take No date recorded No form recorded No frequency recorded No route recorded No set duration recorded No set duration amount recorded active No dosage strength recorded No dosage strength units of measure recorded      cefUROXime (CEFTIN) 250 MG tablet Take 250 mg by mouth 2 (two) times daily.      dexAMETHasone sodium phos, PF, 10 mg/mL Kit by NOT APPLICABLE route.      glucosamine HCl 1,500 mg Tab Take by mouth.      hyaluronate sodium (HYALURONIC ACID, SODIUM, ORAL) Take by mouth.      hydroCHLOROthiazide (MICROZIDE) 12.5 mg capsule hydrochlorothiazide Take No date recorded No form recorded No frequency recorded No route recorded No  "set duration recorded No set duration amount recorded active No dosage strength recorded No dosage strength units of measure recorded      LORazepam (ATIVAN) 1 MG tablet Take 1 tablet (1 mg total) by mouth daily as needed for Anxiety. 30 tablet 0    tadalafiL (CIALIS) 10 MG tablet tadalafil Take No date recorded No form recorded No frequency recorded No route recorded No set duration recorded No set duration amount recorded active No dosage strength recorded No dosage strength units of measure recorded       No facility-administered encounter medications on file as of 6/9/2022.       Allergies:  Review of patient's allergies indicates:   Allergen Reactions    Pseudoephedrine hcl        Health Maintenance:  Immunization History   Administered Date(s) Administered    COVID-19, MRNA, LN-S, PF (MODERNA FULL 0.5 ML DOSE) 02/25/2021, 03/23/2021    Influenza - Quadrivalent - MDCK - PF 10/23/2019, 09/16/2020    Influenza - Quadrivalent - PF *Preferred* (6 months and older) 12/08/2021      Health Maintenance   Topic Date Due    TETANUS VACCINE  Never done    Lipid Panel  06/03/2027    Hepatitis C Screening  Completed        Physical Exam      Vital Signs  Temp: 98.7 °F (37.1 °C)  Temp src: Oral  Pulse: 78  SpO2: 95 %  BP: 130/84  BP Location: Right arm  Patient Position: Sitting  Pain Score: 0-No pain  Height and Weight  Height: 5' 10" (177.8 cm)  Weight: 96.9 kg (213 lb 8.3 oz)  BSA (Calculated - sq m): 2.19 sq meters  BMI (Calculated): 30.6  Weight in (lb) to have BMI = 25: 173.9]    Physical Exam  Vitals reviewed.   Constitutional:       Appearance: He is well-developed.   HENT:      Head: Normocephalic and atraumatic.      Right Ear: External ear normal.      Left Ear: External ear normal.   Cardiovascular:      Rate and Rhythm: Normal rate and regular rhythm.      Heart sounds: Normal heart sounds. No murmur heard.  Pulmonary:      Effort: Pulmonary effort is normal.      Breath sounds: Normal breath sounds. No " wheezing or rales.   Abdominal:      General: Bowel sounds are normal.      Palpations: Abdomen is soft.          Laboratory:  CBC:  Recent Labs   Lab 12/04/20  0744 06/04/21  0828   WBC 5.2 6.4   RBC 5.02 4.99   Hemoglobin 15.6 15.6   Hematocrit 45.8 44.6   Platelets 245 248   MCV 91.2 89.4   MCH 31.1 31.2   MCHC 34.2 34.9     CMP:  Recent Labs   Lab 06/04/21  0828 12/06/21  0956 06/03/22  0843   Glucose 104 H 111 H 107 H   Calcium 9.7 9.6 9.6   ALBUMIN 5.2 H 4.9 4.8   Total Protein 6.9 7.0 7.0   Sodium 138 138 141   Potassium 4.5 4.4 4.8   CO2 27 25 27   Chloride 99 99 102   BUN 15 16 11.0   Alkaline Phosphatase 98 84 94   ALT 25 24 25   AST 24 24 22   Total Bilirubin 2.1 H 2.2 H 1.1     URINALYSIS:       LIPIDS:  Recent Labs   Lab 12/04/20  0744 06/04/21  0828 12/06/21  0956 06/03/22  0843   TSH 3.58 2.44  --   --    HDL 52 62 70 52   Cholesterol 153 156 154 179   Triglycerides 128 123 87 122   LDL Calculated 81 74 70 107   Non-HDL Cholesterol 101 94 84  --      TSH:  Recent Labs   Lab 12/04/20  0744 06/04/21  0828   TSH 3.58 2.44     A1C:  Recent Labs   Lab 12/10/21  1009 06/03/22  0843   Hemoglobin A1C 5.5 5.3       Assessment/Plan     Justus Shaikh is a 62 y.o.male with:    1. Carcinoid tumor of right lung  Continue current surgery plan.    2. Essential hypertension  Continue current meds.    3. Hyperlipidemia, mixed  Continue current meds.    4. Elevated PSA  5. Benign prostatic hyperplasia, unspecified whether lower urinary tract symptoms present  Continue current meds.    6. Anxiety  Continue current meds.      Chronic conditions status updated as per HPI.  Other than changes above, cont current medications and maintain follow up with specialists.  Follow up in about 6 months (around 12/9/2022) for Follow up visit.    No future appointments.    Isidro Worthington MD  [facility]

## 2022-06-24 ENCOUNTER — ANESTHESIA EVENT (OUTPATIENT)
Dept: SURGERY | Facility: HOSPITAL | Age: 62
DRG: 165 | End: 2022-06-24
Payer: COMMERCIAL

## 2022-06-24 ENCOUNTER — PATIENT MESSAGE (OUTPATIENT)
Dept: CARDIOTHORACIC SURGERY | Facility: CLINIC | Age: 62
End: 2022-06-24
Payer: COMMERCIAL

## 2022-06-24 ENCOUNTER — TELEPHONE (OUTPATIENT)
Dept: CARDIOTHORACIC SURGERY | Facility: CLINIC | Age: 62
End: 2022-06-24
Payer: COMMERCIAL

## 2022-06-27 ENCOUNTER — ANESTHESIA (OUTPATIENT)
Dept: SURGERY | Facility: HOSPITAL | Age: 62
DRG: 165 | End: 2022-06-27
Payer: COMMERCIAL

## 2022-06-27 ENCOUNTER — HOSPITAL ENCOUNTER (INPATIENT)
Facility: HOSPITAL | Age: 62
LOS: 1 days | Discharge: HOME | DRG: 165 | End: 2022-06-28
Attending: THORACIC SURGERY (CARDIOTHORACIC VASCULAR SURGERY) | Admitting: THORACIC SURGERY (CARDIOTHORACIC VASCULAR SURGERY)
Payer: COMMERCIAL

## 2022-06-27 DIAGNOSIS — D3A.090 CARCINOID TUMOR OF LUNG: ICD-10-CM

## 2022-06-27 DIAGNOSIS — D3A.090 CARCINOID TUMOR OF RIGHT LUNG: Primary | ICD-10-CM

## 2022-06-27 LAB
ABO + RH BLD: NORMAL
BLD GP AB SCN CELLS X3 SERPL QL: NORMAL
POCT GLUCOSE: 123 MG/DL (ref 70–110)

## 2022-06-27 PROCEDURE — 25000003 PHARM REV CODE 250

## 2022-06-27 PROCEDURE — 71000016 HC POSTOP RECOV ADDL HR: Performed by: THORACIC SURGERY (CARDIOTHORACIC VASCULAR SURGERY)

## 2022-06-27 PROCEDURE — 88341 IMHCHEM/IMCYTCHM EA ADD ANTB: CPT | Mod: 59 | Performed by: PATHOLOGY

## 2022-06-27 PROCEDURE — 36620 LEFT RADIAL ARTERIAL LINE: ICD-10-PCS | Mod: 59,,, | Performed by: ANESTHESIOLOGY

## 2022-06-27 PROCEDURE — 32674 THORACOSCOPY LYMPH NODE EXC: CPT | Mod: AS,,, | Performed by: PHYSICIAN ASSISTANT

## 2022-06-27 PROCEDURE — 86901 BLOOD TYPING SEROLOGIC RH(D): CPT | Performed by: PHYSICIAN ASSISTANT

## 2022-06-27 PROCEDURE — 25000003 PHARM REV CODE 250: Performed by: PHYSICIAN ASSISTANT

## 2022-06-27 PROCEDURE — 32674 THORACOSCOPY LYMPH NODE EXC: CPT | Mod: ,,, | Performed by: THORACIC SURGERY (CARDIOTHORACIC VASCULAR SURGERY)

## 2022-06-27 PROCEDURE — 71000015 HC POSTOP RECOV 1ST HR: Performed by: THORACIC SURGERY (CARDIOTHORACIC VASCULAR SURGERY)

## 2022-06-27 PROCEDURE — C9290 INJ, BUPIVACAINE LIPOSOME: HCPCS | Performed by: THORACIC SURGERY (CARDIOTHORACIC VASCULAR SURGERY)

## 2022-06-27 PROCEDURE — 88312 SPECIAL STAINS GROUP 1: CPT | Mod: 26,,, | Performed by: PATHOLOGY

## 2022-06-27 PROCEDURE — 88305 TISSUE EXAM BY PATHOLOGIST: CPT | Performed by: PATHOLOGY

## 2022-06-27 PROCEDURE — 27201423 OPTIME MED/SURG SUP & DEVICES STERILE SUPPLY: Performed by: THORACIC SURGERY (CARDIOTHORACIC VASCULAR SURGERY)

## 2022-06-27 PROCEDURE — 88312 SPECIAL STAINS GROUP 1: CPT | Performed by: PATHOLOGY

## 2022-06-27 PROCEDURE — 32663 THORACOSCOPY W/LOBECTOMY: CPT | Mod: LT,,, | Performed by: THORACIC SURGERY (CARDIOTHORACIC VASCULAR SURGERY)

## 2022-06-27 PROCEDURE — 88342 IMHCHEM/IMCYTCHM 1ST ANTB: CPT | Performed by: PATHOLOGY

## 2022-06-27 PROCEDURE — 88305 TISSUE EXAM BY PATHOLOGIST: ICD-10-PCS | Mod: 26,,, | Performed by: PATHOLOGY

## 2022-06-27 PROCEDURE — 63600175 PHARM REV CODE 636 W HCPCS

## 2022-06-27 PROCEDURE — 63600175 PHARM REV CODE 636 W HCPCS: Performed by: THORACIC SURGERY (CARDIOTHORACIC VASCULAR SURGERY)

## 2022-06-27 PROCEDURE — 32663 PR THORACOSCOPY SURG LOBECTOMY: ICD-10-PCS | Mod: AS,LT,, | Performed by: PHYSICIAN ASSISTANT

## 2022-06-27 PROCEDURE — 94761 N-INVAS EAR/PLS OXIMETRY MLT: CPT

## 2022-06-27 PROCEDURE — 94640 AIRWAY INHALATION TREATMENT: CPT

## 2022-06-27 PROCEDURE — 32674 PR THORACOSCOPY LYMPH NODE EXC: ICD-10-PCS | Mod: AS,,, | Performed by: PHYSICIAN ASSISTANT

## 2022-06-27 PROCEDURE — 88342 IMHCHEM/IMCYTCHM 1ST ANTB: CPT | Mod: 26,,, | Performed by: PATHOLOGY

## 2022-06-27 PROCEDURE — 37000008 HC ANESTHESIA 1ST 15 MINUTES: Performed by: THORACIC SURGERY (CARDIOTHORACIC VASCULAR SURGERY)

## 2022-06-27 PROCEDURE — 32663 THORACOSCOPY W/LOBECTOMY: CPT | Mod: AS,LT,, | Performed by: PHYSICIAN ASSISTANT

## 2022-06-27 PROCEDURE — 36620 INSERTION CATHETER ARTERY: CPT | Mod: 59,,, | Performed by: ANESTHESIOLOGY

## 2022-06-27 PROCEDURE — 63600175 PHARM REV CODE 636 W HCPCS: Performed by: ANESTHESIOLOGY

## 2022-06-27 PROCEDURE — 25000242 PHARM REV CODE 250 ALT 637 W/ HCPCS: Performed by: PHYSICIAN ASSISTANT

## 2022-06-27 PROCEDURE — 71000033 HC RECOVERY, INTIAL HOUR: Performed by: THORACIC SURGERY (CARDIOTHORACIC VASCULAR SURGERY)

## 2022-06-27 PROCEDURE — 25000003 PHARM REV CODE 250: Performed by: THORACIC SURGERY (CARDIOTHORACIC VASCULAR SURGERY)

## 2022-06-27 PROCEDURE — C1729 CATH, DRAINAGE: HCPCS | Performed by: THORACIC SURGERY (CARDIOTHORACIC VASCULAR SURGERY)

## 2022-06-27 PROCEDURE — 32663 PR THORACOSCOPY SURG LOBECTOMY: ICD-10-PCS | Mod: LT,,, | Performed by: THORACIC SURGERY (CARDIOTHORACIC VASCULAR SURGERY)

## 2022-06-27 PROCEDURE — 20600001 HC STEP DOWN PRIVATE ROOM

## 2022-06-27 PROCEDURE — 27201037 HC PRESSURE MONITORING SET UP

## 2022-06-27 PROCEDURE — 36000712 HC OR TIME LEV V 1ST 15 MIN: Performed by: THORACIC SURGERY (CARDIOTHORACIC VASCULAR SURGERY)

## 2022-06-27 PROCEDURE — 88309 TISSUE EXAM BY PATHOLOGIST: CPT | Mod: 26,,, | Performed by: PATHOLOGY

## 2022-06-27 PROCEDURE — 36415 COLL VENOUS BLD VENIPUNCTURE: CPT | Performed by: THORACIC SURGERY (CARDIOTHORACIC VASCULAR SURGERY)

## 2022-06-27 PROCEDURE — 63600175 PHARM REV CODE 636 W HCPCS: Performed by: PHYSICIAN ASSISTANT

## 2022-06-27 PROCEDURE — 32674 PR THORACOSCOPY LYMPH NODE EXC: ICD-10-PCS | Mod: ,,, | Performed by: THORACIC SURGERY (CARDIOTHORACIC VASCULAR SURGERY)

## 2022-06-27 PROCEDURE — D9220A PRA ANESTHESIA: ICD-10-PCS | Mod: ,,, | Performed by: ANESTHESIOLOGY

## 2022-06-27 PROCEDURE — 88307 TISSUE EXAM BY PATHOLOGIST: CPT | Mod: 59 | Performed by: PATHOLOGY

## 2022-06-27 PROCEDURE — D9220A PRA ANESTHESIA: Mod: ,,, | Performed by: ANESTHESIOLOGY

## 2022-06-27 PROCEDURE — 88342 CHG IMMUNOCYTOCHEMISTRY: ICD-10-PCS | Mod: 26,,, | Performed by: PATHOLOGY

## 2022-06-27 PROCEDURE — 36000713 HC OR TIME LEV V EA ADD 15 MIN: Performed by: THORACIC SURGERY (CARDIOTHORACIC VASCULAR SURGERY)

## 2022-06-27 PROCEDURE — 82962 GLUCOSE BLOOD TEST: CPT | Performed by: THORACIC SURGERY (CARDIOTHORACIC VASCULAR SURGERY)

## 2022-06-27 PROCEDURE — 88312 PR  SPECIAL STAINS,GROUP I: ICD-10-PCS | Mod: 26,,, | Performed by: PATHOLOGY

## 2022-06-27 PROCEDURE — 88309 PR  SURG PATH,LEVEL VI: ICD-10-PCS | Mod: 26,,, | Performed by: PATHOLOGY

## 2022-06-27 PROCEDURE — 88341 PR IHC OR ICC EACH ADD'L SINGLE ANTIBODY  STAINPR: ICD-10-PCS | Mod: 26,,, | Performed by: PATHOLOGY

## 2022-06-27 PROCEDURE — 88305 TISSUE EXAM BY PATHOLOGIST: CPT | Mod: 26,,, | Performed by: PATHOLOGY

## 2022-06-27 PROCEDURE — 37000009 HC ANESTHESIA EA ADD 15 MINS: Performed by: THORACIC SURGERY (CARDIOTHORACIC VASCULAR SURGERY)

## 2022-06-27 PROCEDURE — 88341 IMHCHEM/IMCYTCHM EA ADD ANTB: CPT | Mod: 26,,, | Performed by: PATHOLOGY

## 2022-06-27 RX ORDER — IPRATROPIUM BROMIDE AND ALBUTEROL SULFATE 2.5; .5 MG/3ML; MG/3ML
3 SOLUTION RESPIRATORY (INHALATION)
Status: DISCONTINUED | OUTPATIENT
Start: 2022-06-27 | End: 2022-06-28 | Stop reason: HOSPADM

## 2022-06-27 RX ORDER — OXYCODONE HYDROCHLORIDE 5 MG/1
5 TABLET ORAL EVERY 4 HOURS PRN
Status: DISCONTINUED | OUTPATIENT
Start: 2022-06-27 | End: 2022-06-28 | Stop reason: HOSPADM

## 2022-06-27 RX ORDER — HALOPERIDOL 5 MG/ML
INJECTION INTRAMUSCULAR
Status: DISCONTINUED | OUTPATIENT
Start: 2022-06-27 | End: 2022-06-27

## 2022-06-27 RX ORDER — POLYETHYLENE GLYCOL 3350 17 G/17G
17 POWDER, FOR SOLUTION ORAL DAILY
Status: DISCONTINUED | OUTPATIENT
Start: 2022-06-27 | End: 2022-06-28 | Stop reason: HOSPADM

## 2022-06-27 RX ORDER — HALOPERIDOL 5 MG/ML
0.5 INJECTION INTRAMUSCULAR EVERY 10 MIN PRN
Status: DISCONTINUED | OUTPATIENT
Start: 2022-06-27 | End: 2022-06-27 | Stop reason: HOSPADM

## 2022-06-27 RX ORDER — FENTANYL CITRATE 50 UG/ML
INJECTION, SOLUTION INTRAMUSCULAR; INTRAVENOUS
Status: DISCONTINUED | OUTPATIENT
Start: 2022-06-27 | End: 2022-06-27

## 2022-06-27 RX ORDER — CEFAZOLIN SODIUM/D5W 2 G/100 ML
2 PLASTIC BAG, INJECTION (ML) INTRAVENOUS
Status: COMPLETED | OUTPATIENT
Start: 2022-06-27 | End: 2022-06-27

## 2022-06-27 RX ORDER — BUPIVACAINE HYDROCHLORIDE 2.5 MG/ML
INJECTION, SOLUTION EPIDURAL; INFILTRATION; INTRACAUDAL
Status: DISCONTINUED | OUTPATIENT
Start: 2022-06-27 | End: 2022-06-27 | Stop reason: HOSPADM

## 2022-06-27 RX ORDER — PHENYLEPHRINE HCL IN 0.9% NACL 1 MG/10 ML
SYRINGE (ML) INTRAVENOUS
Status: DISCONTINUED | OUTPATIENT
Start: 2022-06-27 | End: 2022-06-27

## 2022-06-27 RX ORDER — SODIUM CHLORIDE 9 MG/ML
INJECTION, SOLUTION INTRAVENOUS CONTINUOUS
Status: DISCONTINUED | OUTPATIENT
Start: 2022-06-27 | End: 2022-06-27

## 2022-06-27 RX ORDER — DEXAMETHASONE SODIUM PHOSPHATE 4 MG/ML
INJECTION, SOLUTION INTRA-ARTICULAR; INTRALESIONAL; INTRAMUSCULAR; INTRAVENOUS; SOFT TISSUE
Status: DISCONTINUED | OUTPATIENT
Start: 2022-06-27 | End: 2022-06-27

## 2022-06-27 RX ORDER — ONDANSETRON 8 MG/1
8 TABLET, ORALLY DISINTEGRATING ORAL EVERY 8 HOURS PRN
Status: DISCONTINUED | OUTPATIENT
Start: 2022-06-27 | End: 2022-06-28 | Stop reason: HOSPADM

## 2022-06-27 RX ORDER — LIDOCAINE HYDROCHLORIDE 20 MG/ML
INJECTION, SOLUTION EPIDURAL; INFILTRATION; INTRACAUDAL; PERINEURAL
Status: DISCONTINUED | OUTPATIENT
Start: 2022-06-27 | End: 2022-06-27

## 2022-06-27 RX ORDER — KETAMINE HCL IN 0.9 % NACL 50 MG/5 ML
SYRINGE (ML) INTRAVENOUS
Status: DISCONTINUED | OUTPATIENT
Start: 2022-06-27 | End: 2022-06-27

## 2022-06-27 RX ORDER — DIPHENHYDRAMINE HYDROCHLORIDE 50 MG/ML
INJECTION INTRAMUSCULAR; INTRAVENOUS
Status: DISCONTINUED | OUTPATIENT
Start: 2022-06-27 | End: 2022-06-27

## 2022-06-27 RX ORDER — ENOXAPARIN SODIUM 100 MG/ML
40 INJECTION SUBCUTANEOUS EVERY 24 HOURS
Status: DISCONTINUED | OUTPATIENT
Start: 2022-06-27 | End: 2022-06-28 | Stop reason: HOSPADM

## 2022-06-27 RX ORDER — AMOXICILLIN 250 MG
1 CAPSULE ORAL 2 TIMES DAILY
Status: DISCONTINUED | OUTPATIENT
Start: 2022-06-27 | End: 2022-06-28 | Stop reason: HOSPADM

## 2022-06-27 RX ORDER — CELECOXIB 200 MG/1
400 CAPSULE ORAL ONCE
Status: COMPLETED | OUTPATIENT
Start: 2022-06-27 | End: 2022-06-27

## 2022-06-27 RX ORDER — ACETAMINOPHEN 325 MG/1
650 TABLET ORAL EVERY 4 HOURS PRN
Status: DISCONTINUED | OUTPATIENT
Start: 2022-06-27 | End: 2022-06-28 | Stop reason: HOSPADM

## 2022-06-27 RX ORDER — LIDOCAINE HYDROCHLORIDE 10 MG/ML
1 INJECTION, SOLUTION EPIDURAL; INFILTRATION; INTRACAUDAL; PERINEURAL ONCE
Status: COMPLETED | OUTPATIENT
Start: 2022-06-27 | End: 2022-06-27

## 2022-06-27 RX ORDER — ONDANSETRON 2 MG/ML
INJECTION INTRAMUSCULAR; INTRAVENOUS
Status: DISCONTINUED | OUTPATIENT
Start: 2022-06-27 | End: 2022-06-27

## 2022-06-27 RX ORDER — OXYCODONE HYDROCHLORIDE 10 MG/1
10 TABLET ORAL EVERY 4 HOURS PRN
Status: DISCONTINUED | OUTPATIENT
Start: 2022-06-27 | End: 2022-06-28 | Stop reason: HOSPADM

## 2022-06-27 RX ORDER — ACETAMINOPHEN 325 MG/1
650 TABLET ORAL EVERY 8 HOURS
Status: DISCONTINUED | OUTPATIENT
Start: 2022-06-27 | End: 2022-06-28 | Stop reason: HOSPADM

## 2022-06-27 RX ORDER — FENTANYL CITRATE 50 UG/ML
25 INJECTION, SOLUTION INTRAMUSCULAR; INTRAVENOUS EVERY 5 MIN PRN
Status: DISCONTINUED | OUTPATIENT
Start: 2022-06-27 | End: 2022-06-27 | Stop reason: HOSPADM

## 2022-06-27 RX ORDER — MIDAZOLAM HYDROCHLORIDE 1 MG/ML
INJECTION, SOLUTION INTRAMUSCULAR; INTRAVENOUS
Status: DISCONTINUED | OUTPATIENT
Start: 2022-06-27 | End: 2022-06-27

## 2022-06-27 RX ORDER — PROPOFOL 10 MG/ML
VIAL (ML) INTRAVENOUS
Status: DISCONTINUED | OUTPATIENT
Start: 2022-06-27 | End: 2022-06-27

## 2022-06-27 RX ORDER — SODIUM CHLORIDE 0.9 % (FLUSH) 0.9 %
10 SYRINGE (ML) INJECTION
Status: DISCONTINUED | OUTPATIENT
Start: 2022-06-27 | End: 2022-06-28 | Stop reason: HOSPADM

## 2022-06-27 RX ORDER — ACETAMINOPHEN 500 MG
1000 TABLET ORAL ONCE
Status: COMPLETED | OUTPATIENT
Start: 2022-06-27 | End: 2022-06-27

## 2022-06-27 RX ORDER — BISACODYL 10 MG
10 SUPPOSITORY, RECTAL RECTAL DAILY PRN
Status: DISCONTINUED | OUTPATIENT
Start: 2022-06-27 | End: 2022-06-28 | Stop reason: HOSPADM

## 2022-06-27 RX ORDER — GABAPENTIN 300 MG/1
300 CAPSULE ORAL ONCE
Status: COMPLETED | OUTPATIENT
Start: 2022-06-27 | End: 2022-06-27

## 2022-06-27 RX ORDER — ROCURONIUM BROMIDE 10 MG/ML
INJECTION, SOLUTION INTRAVENOUS
Status: DISCONTINUED | OUTPATIENT
Start: 2022-06-27 | End: 2022-06-27

## 2022-06-27 RX ADMIN — CELECOXIB 400 MG: 200 CAPSULE ORAL at 06:06

## 2022-06-27 RX ADMIN — DEXTROSE 2 G: 50 INJECTION, SOLUTION INTRAVENOUS at 05:06

## 2022-06-27 RX ADMIN — HALOPERIDOL LACTATE 0.5 MG: 5 INJECTION, SOLUTION INTRAMUSCULAR at 10:06

## 2022-06-27 RX ADMIN — ROCURONIUM BROMIDE 10 MG: 10 INJECTION INTRAVENOUS at 09:06

## 2022-06-27 RX ADMIN — SODIUM CHLORIDE: 0.9 INJECTION, SOLUTION INTRAVENOUS at 06:06

## 2022-06-27 RX ADMIN — SODIUM CHLORIDE 0.25 MCG/KG/MIN: 9 INJECTION, SOLUTION INTRAVENOUS at 09:06

## 2022-06-27 RX ADMIN — DIPHENHYDRAMINE HYDROCHLORIDE 25 MG: 50 INJECTION INTRAMUSCULAR; INTRAVENOUS at 07:06

## 2022-06-27 RX ADMIN — ROCURONIUM BROMIDE 10 MG: 10 INJECTION INTRAVENOUS at 10:06

## 2022-06-27 RX ADMIN — ROCURONIUM BROMIDE 20 MG: 10 INJECTION INTRAVENOUS at 08:06

## 2022-06-27 RX ADMIN — FENTANYL CITRATE 50 MCG: 50 INJECTION INTRAMUSCULAR; INTRAVENOUS at 11:06

## 2022-06-27 RX ADMIN — SENNOSIDES AND DOCUSATE SODIUM 1 TABLET: 50; 8.6 TABLET ORAL at 11:06

## 2022-06-27 RX ADMIN — LIDOCAINE HYDROCHLORIDE 100 MG: 20 INJECTION, SOLUTION EPIDURAL; INFILTRATION; INTRACAUDAL at 07:06

## 2022-06-27 RX ADMIN — Medication 200 MCG: at 07:06

## 2022-06-27 RX ADMIN — PROPOFOL 200 MG: 10 INJECTION, EMULSION INTRAVENOUS at 07:06

## 2022-06-27 RX ADMIN — Medication 20 MG: at 07:06

## 2022-06-27 RX ADMIN — IPRATROPIUM BROMIDE AND ALBUTEROL SULFATE 3 ML: 2.5; .5 SOLUTION RESPIRATORY (INHALATION) at 02:06

## 2022-06-27 RX ADMIN — FENTANYL CITRATE 50 MCG: 50 INJECTION INTRAMUSCULAR; INTRAVENOUS at 10:06

## 2022-06-27 RX ADMIN — Medication 10 MG: at 10:06

## 2022-06-27 RX ADMIN — ROCURONIUM BROMIDE 60 MG: 10 INJECTION INTRAVENOUS at 07:06

## 2022-06-27 RX ADMIN — POLYETHYLENE GLYCOL 3350 17 G: 17 POWDER, FOR SOLUTION ORAL at 11:06

## 2022-06-27 RX ADMIN — Medication 10 MG: at 08:06

## 2022-06-27 RX ADMIN — ENOXAPARIN SODIUM 40 MG: 100 INJECTION SUBCUTANEOUS at 04:06

## 2022-06-27 RX ADMIN — DEXAMETHASONE SODIUM PHOSPHATE 4 MG: 4 INJECTION INTRA-ARTICULAR; INTRALESIONAL; INTRAMUSCULAR; INTRAVENOUS; SOFT TISSUE at 07:06

## 2022-06-27 RX ADMIN — OXYCODONE HYDROCHLORIDE 10 MG: 10 TABLET ORAL at 11:06

## 2022-06-27 RX ADMIN — LIDOCAINE HYDROCHLORIDE 1 MG: 10 INJECTION, SOLUTION EPIDURAL; INFILTRATION; INTRACAUDAL at 06:06

## 2022-06-27 RX ADMIN — FENTANYL CITRATE 25 MCG: 50 INJECTION INTRAMUSCULAR; INTRAVENOUS at 12:06

## 2022-06-27 RX ADMIN — ACETAMINOPHEN 1000 MG: 500 TABLET ORAL at 06:06

## 2022-06-27 RX ADMIN — ACETAMINOPHEN 650 MG: 325 TABLET ORAL at 05:06

## 2022-06-27 RX ADMIN — FENTANYL CITRATE 25 MCG: 50 INJECTION INTRAMUSCULAR; INTRAVENOUS at 01:06

## 2022-06-27 RX ADMIN — ACETAMINOPHEN 650 MG: 325 TABLET ORAL at 11:06

## 2022-06-27 RX ADMIN — OXYCODONE HYDROCHLORIDE 10 MG: 10 TABLET ORAL at 07:06

## 2022-06-27 RX ADMIN — FENTANYL CITRATE 100 MCG: 50 INJECTION INTRAMUSCULAR; INTRAVENOUS at 07:06

## 2022-06-27 RX ADMIN — ROCURONIUM BROMIDE 10 MG: 10 INJECTION INTRAVENOUS at 08:06

## 2022-06-27 RX ADMIN — DEXTROSE 2 G: 50 INJECTION, SOLUTION INTRAVENOUS at 11:06

## 2022-06-27 RX ADMIN — ONDANSETRON 8 MG: 2 INJECTION INTRAMUSCULAR; INTRAVENOUS at 07:06

## 2022-06-27 RX ADMIN — IPRATROPIUM BROMIDE AND ALBUTEROL SULFATE 3 ML: 2.5; .5 SOLUTION RESPIRATORY (INHALATION) at 09:06

## 2022-06-27 RX ADMIN — Medication 2 G: at 07:06

## 2022-06-27 RX ADMIN — ACETAMINOPHEN 650 MG: 325 TABLET ORAL at 01:06

## 2022-06-27 RX ADMIN — GABAPENTIN 300 MG: 300 CAPSULE ORAL at 06:06

## 2022-06-27 RX ADMIN — OXYCODONE HYDROCHLORIDE 10 MG: 10 TABLET ORAL at 04:06

## 2022-06-27 RX ADMIN — Medication 10 MG: at 07:06

## 2022-06-27 RX ADMIN — MIDAZOLAM 2 MG: 1 INJECTION INTRAMUSCULAR; INTRAVENOUS at 06:06

## 2022-06-27 NOTE — OP NOTE
[facility]  Surgery Department  Operative Note    SUMMARY     Date of Procedure: 6/27/2022     Procedure: Procedure(s) (LRB):  XI ROBOTIC RATS,WITH Right Lower LOBECTOMY,LUNG (Right)  LYMPHADENECTOMY (Right)  BLOCK, NERVE, INTERCOSTAL, 2 OR MORE (Right)     Surgeon(s) and Role:     * Osvaldo Saeed MD - Primary     * Emily Sy PA-C - Assisting     * Tere Soto MD - Fellow        Pre-Operative Diagnosis: Carcinoid tumor of right lung [D3A.090]    Post-Operative Diagnosis: Post-Op Diagnosis Codes:     * Carcinoid tumor of right lung [D3A.090]    Anesthesia: General    Procedure:  The patient was taken to the operating room placed supine and identified.  General anesthesia was administered with double-lumen tube placement.  Tube positioning was confirmed fiberoptically. The patient was positioned in the left lateral decubitus position all pressure points were padded.  Single lung anesthesia was administered the chest was prepped and draped in sterile fashion.  A time-out was performed.  The chest was entered in the 8th interspace posterior axillary line. Two additional 8mm ports and a 12mm port were placed. A 12 mm assistant port was placed in the 10th interspace.  A 6 level intercostal nerve block was performed using an Exparel/Marcaine and saline cocktail.     The inferior pulmonary ligament was incised and the posterior hilar pleura opened.  The subcarinal letty packet was harvested.  A posterior level 11 sump node was harvested. The paratracheal space was opened and level 2 and 4 nodes were harvested.  Surgicel was placed along the mediastinal lymph letty dissection bed.  The lung was retracted posteriorly exposing the anterior hilum. The anterior hilum was dissected and the fissure was completed with a blue load of the robotic stapler. The interlobar pulmonary artery was dissected and the branches to the lower lobe were isolated. The artery was divided using a robotic vascular  load. The lower lobe bronchus was isolated and divided with a robotic black load stapler following a clamp trial. The inferior pulmonary vein was isolated and divided with a robotic vascular load. The anterior port was extended and the specimen was removed via an endocatch bag. A #24Fr Amadeo drain was inserted and exteriorized via the assistant port.  All ports were removed and each port site found to be hemostatic.  There was no evidence of right middle lobe torsion by direct vision as well as bronchoscopic evaluation.  Right lung ventilation was restored with complete lung expansion.  The chest drain was secured using a silk suture.  The remaining port sites were closed in 2 layers using absorbable suture.  Sterile dressing was applied.  The patient was extubated transported to the PACU in stable condition.    Estimated Blood Loss (EBL): 10ml           Implants: * No implants in log *    Specimens:   Specimen (24h ago, onward)             Start     Ordered    06/27/22 1015  Specimen to Pathology, Surgery Pulmonary and Thoracic  Once        Comments: Pre-op Diagnosis: Carcinoid tumor of right lung [D3A.090]Procedure(s):XI ROBOTIC RATS,WITH Right Lower LOBECTOMY,LUNGLYMPHADENECTOMYBLOCK, NERVE, INTERCOSTAL, 2 OR MORE Number of specimens: 7Name of specimens: 1. Level 7 lymph node - permanent2. Level 11 posterior sump lymph node - permanent3. Level 11 near interlobar pulmonary artery - permanent4. Anterior level 11 lymph node - permanent5. Level 12 lymph node - permanent6. Right lower lobe - permanent7. Level 2 and 4 lymph node - permanent     References:    Click here for ordering Quick Tip   Question Answer Comment   Procedure Type: Pulmonary and Thoracic    Specimen Class: Known or suspected malignancy    Which provider would you like to cc? BEBETO AMAYA    Release to patient 7 Day Delay    Reason for preventing immediate release Reasonable likelihood of causing patient harm    Additional details for  preventing immediate release n/a        06/27/22 1040                        Condition: Good    Disposition: PACU - hemodynamically stable.    Tere Soto MD, PGY-6  Cardiothoracic Surgery   [phone]

## 2022-06-27 NOTE — H&P
History & Physical     SUBJECTIVE:      History of Present Illness:  62 year old male with HTN, HLD, Gilbert's Disease, BPH and newly diagnosed typical carcinoid of right lower lobe. 2.5cm pulmonary mass found incidentally on calcium scoring CT in March 2022. Minimal uptake on FDG PET. Referred to Pulmonology and underwent a robotic bronchoscopy with EBUS on 5/6/22.  A partially obstructing mass was found proximally, at the orifice in the lateral basal segment of the right lower lobe and in the posterior basal segment of the right lower lobe. Pathology resulted as typical carcinoid. Levels 7 and 11 were negative for malignancy.      Smokes cigars. 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  COVID positive in December 2020, asymptomatic.   PSH of right rotator cuff, right hand and right knee procedures, left shoulder arthroscopy          Chief Complaint   Patient presents with    Consult              Review of patient's allergies indicates:   Allergen Reactions    Pseudoephedrine hcl           Current Medications          Current Outpatient Medications   Medication Sig Dispense Refill    aspirin (ECOTRIN) 81 MG EC tablet Take 81 mg by mouth once daily.        aspirin 162.5 mg Cp24 aspirin Take No date recorded No form recorded No frequency recorded No route recorded No set duration recorded No set duration amount recorded active No dosage strength recorded No dosage strength units of measure recorded        cefUROXime (CEFTIN) 250 MG tablet Take 250 mg by mouth 2 (two) times daily.        dexAMETHasone sodium phos, PF, 10 mg/mL Kit by NOT APPLICABLE route.        folic acid/multivit-min/lutein (CENTRUM SILVER ORAL) Take by mouth.        glucosamine HCl 1,500 mg Tab Take by mouth.        hyaluronate sodium (HYALURONIC ACID, SODIUM, ORAL) Take by mouth.        hydroCHLOROthiazide (MICROZIDE) 12.5 mg capsule hydrochlorothiazide Take No date recorded No  form recorded No frequency recorded No route recorded No set duration recorded No set duration amount recorded active No dosage strength recorded No dosage strength units of measure recorded        irbesartan-hydrochlorothiazide (AVALIDE) 300-12.5 mg per tablet Take 1 tablet by mouth once daily. 90 tablet 3    minoxidiL (LONITEN) 2.5 MG tablet Take 1.25-2.5 mg by mouth nightly.        omega 3-dha-epa-fish oil-krill 339 mg-314 mg- 500 mg Cap Take by mouth.        rosuvastatin (CRESTOR) 40 MG Tab Take 1 tablet (40 mg total) by mouth every evening. 90 tablet 3    tadalafiL (CIALIS) 10 MG tablet tadalafil Take No date recorded No form recorded No frequency recorded No route recorded No set duration recorded No set duration amount recorded active No dosage strength recorded No dosage strength units of measure recorded        tadalafiL (CIALIS) 5 MG tablet Take 5 mg by mouth daily as needed for Erectile Dysfunction.        LORazepam (ATIVAN) 1 MG tablet Take 1 tablet (1 mg total) by mouth daily as needed for Anxiety. 30 tablet 0      No current facility-administered medications for this visit.            No past medical history on file.        Past Surgical History:   Procedure Laterality Date    HAND SURGERY Right      KNEE SURGERY        ROBOTIC BRONCHOSCOPY N/A 5/6/2022     Procedure: ROBOTIC BRONCHOSCOPY with fluro  ;  Surgeon: Chava Mcdonald MD;  Location: [address];  Service: Pulmonary;  Laterality: N/A;    ROTATOR CUFF REPAIR Right        No family history on file.  Social History           Tobacco Use    Smoking status: Light Tobacco Smoker    Smokeless tobacco: Never Used   Substance Use Topics    Alcohol use: Yes         Review of Systems:  Review of Systems   Constitutional: Negative for activity change and appetite change.   Respiratory: Negative for shortness of breath.    Cardiovascular: Negative for chest pain and palpitations.   Gastrointestinal: Negative for abdominal pain.  "  Genitourinary: Negative for difficulty urinating.   Musculoskeletal: Positive for arthralgias.   Skin: Negative for color change and rash.   Neurological: Negative for dizziness and syncope.   Psychiatric/Behavioral: Negative for agitation. The patient is not nervous/anxious.          OBJECTIVE:      Vital Signs (Most Recent)  Pulse: 73 (05/20/22 0933)  BP: (!) 146/84 (05/20/22 0933)  SpO2: 95 % (05/20/22 0933)  5' 10" (1.778 m)  96.2 kg (212 lb)      Physical Exam:  Physical Exam  Constitutional:       Appearance: Normal appearance.   HENT:      Head: Atraumatic.   Cardiovascular:      Rate and Rhythm: Normal rate and regular rhythm.      Pulses: Normal pulses.      Heart sounds: Normal heart sounds.   Pulmonary:      Effort: Pulmonary effort is normal.      Breath sounds: Normal breath sounds.   Musculoskeletal:         General: Normal range of motion.      Cervical back: Normal range of motion and neck supple.      Right lower leg: No edema.      Left lower leg: No edema.   Skin:     General: Skin is warm and dry.   Neurological:      General: No focal deficit present.      Mental Status: He is alert and oriented to person, place, and time.   Psychiatric:         Mood and Affect: Mood normal.         Behavior: Behavior normal.            Diagnostic Results:     3/4/22- CT Chest with Contrast:  There is a 2.5 x 2.0 cm heterogeneously enhancing nodule in the infrahilar region on the right.  Further evaluation with PET-CT may be helpful but please note that if this lesion is a carcinoid tumor that it is possible that this lesion may demonstrate no uptake.  Endobronchial tissue sampling is also offered for consideration.  Percutaneous biopsy of this lesion would be very difficult as there are numerous large vessel surrounding the lesion.  There is opacification of multiple bronchi inferior to this lesion likely secondary to postobstructive changes  The aorta is unremarkable in appearance. There is no pericardial " effusion.  No enlarged mediastinal, hilar or axillary lymph nodes are identified.  The visualized upper abdomen is unremarkable in appearance.  No suspicious appearing osseus abnormalities are identified.     3/31/22- FDG PET:  Non FDG avid infrahilar right lower lobe pulmonary nodule with associated bronchial obstruction.  Lack of significant FDG avidity and endobronchial association suggest potential carcinoid tumor.  Tissue sampling would be required for definitive diagnosis.       5/19/22- PFTs:  FEV1- 2.9 88%  DLCO- 26 95%        ASSESSMENT/PLAN:      62 year old male with HTN, HLD, Gilbert's Disease, BPH and newly diagnosed typical carcinoid of right lower lobe.     PLAN:Plan      Robotic RLL    Tere Soto MD, PGY-6  Cardiothoracic Surgery   [phone]

## 2022-06-27 NOTE — ANESTHESIA PROCEDURE NOTES
Left Radial Arterial Line    Diagnosis: Carcinoid Tumor of Right Lung    Patient location during procedure: done in OR    Staffing  Authorizing Provider: John Dangelo MD  Performing Provider: Shamar Bautista MD    Anesthesiologist was present at the time of the procedure.    Preanesthetic Checklist  Completed: patient identified, IV checked, site marked, risks and benefits discussed, surgical consent, monitors and equipment checked, pre-op evaluation, timeout performed and anesthesia consent givenLeft Radial Arterial Line  Skin Prep: chlorhexidine gluconate and isopropyl alcohol  Orientation: left  Location: radial    Catheter Size: 20 G  Catheter placement by Anatomical landmarks. Heme positive aspiration all ports. Insertion Attempts: 1  Assessment  Dressing: secured with tape and tegaderm  Patient: Tolerated well

## 2022-06-27 NOTE — RESPIRATORY THERAPY
RAPID RESPONSE RESPIRATORY CHART CHECK       Chart check completed.  Please call [phone] for further concerns or assistance.

## 2022-06-27 NOTE — NURSING TRANSFER
Nursing Transfer Note      6/27/2022     Reason patient is being transferred: post-procedure    Transfer To: 1042    Transfer via bed    Transfer with to O2, 2 L NC    Transported by RN and transport    Medicines sent: none    Any special needs or follow-up needed: routine    Chart send with patient: Yes    Notified: spouse    Patient reassessed at: 1345, 6/27

## 2022-06-27 NOTE — TRANSFER OF CARE
"Anesthesia Transfer of Care Note    Patient: Justus Shaikh    Procedure(s) Performed: Procedure(s) (LRB):  XI ROBOTIC RATS,WITH Right Lower LOBECTOMY,LUNG (Right)  LYMPHADENECTOMY (Right)  BLOCK, NERVE, INTERCOSTAL, 2 OR MORE (Right)    Patient location: PACU    Anesthesia Type: general    Transport from OR: Transported from OR on 6-10 L/min O2 by face mask with adequate spontaneous ventilation    Post pain: adequate analgesia    Post assessment: no apparent anesthetic complications    Post vital signs: stable    Level of consciousness: awake and alert    Nausea/Vomiting: no nausea/vomiting    Complications: none    Transfer of care protocol was followed      Last vitals:   Visit Vitals  /88   Pulse 96   Temp 97.0 °F    Resp 15   Ht 5' 10" (1.778 m)   Wt 96.2 kg (212 lb 1.3 oz)   SpO2 97%   BMI 30.43 kg/m²     "

## 2022-06-27 NOTE — NURSING
Patient up to floor via stretcher. Patient AAOX4, in NAD. Wife at bedside. Patient with no complaints of pain. Chest tube to right side and to water seal with serosanguineous output. Chest ube site with gauze and Tegaderm in place, CDI. Incisions x3 to right side with gauze and Tegaderm, CDI. Patient on 2 L per nasal cannula with oxygen saturation of 93%. Patient SR with HR in the 70s on monitor. Plan of care reviewed with patient, patient verbalized understanding. Patient oriented to room and use of call bell. Bed in lowest locked position, side rails up x2, all belongings within reach. Will continue to monitor.

## 2022-06-27 NOTE — BRIEF OP NOTE
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 Level 7 lymph node - permanent2. Level 11 posterior sump lymph node - permanent3. Level 11 near interlobar pulmonary artery - permanent4. Anterior level 11 lymph node - permanent5. Level 12 lymph node - permanent6. Right lower lobe - permanent7. Level 2 and 4 lymph node - permanent     References:    Click here for ordering Quick Tip   Question Answer Comment   Procedure Type: Pulmonary and Thoracic    Specimen Class: Known or suspected malignancy    Which provider would you like to cc? BEBETO SAEED    Release to patient 7 Day Delay     Reason for preventing immediate release Reasonable likelihood of causing patient harm    Additional details for preventing immediate release n/a        06/27/22 1040                        Condition: Good    Disposition: PACU - hemodynamically stable.    Attestation: A qualified resident physician was not available.

## 2022-06-27 NOTE — ANESTHESIA POSTPROCEDURE EVALUATION
Anesthesia Post Evaluation    Patient: Justus Shaikh    Procedure(s) Performed: Procedure(s) (LRB):  XI ROBOTIC RATS,WITH Right Lower LOBECTOMY,LUNG (Right)  LYMPHADENECTOMY (Right)  BLOCK, NERVE, INTERCOSTAL, 2 OR MORE (Right)    Final Anesthesia Type: general      Patient location during evaluation: PACU  Patient participation: Yes- Able to Participate  Level of consciousness: awake and alert  Post-procedure vital signs: reviewed and stable  Pain management: adequate  Airway patency: patent    PONV status at discharge: No PONV  Anesthetic complications: no      Cardiovascular status: hemodynamically stable  Respiratory status: unassisted  Hydration status: euvolemic  Follow-up not needed.          Vitals Value Taken Time   /79 06/27/22 1443   Temp 36.7 °C (98.1 °F) 06/27/22 1443   Pulse 93 06/27/22 1443   Resp 18 06/27/22 1443   SpO2 93 % 06/27/22 1443         Event Time   Out of Recovery 11:45:00         Pain/Maria G Score: Pain Rating Prior to Med Admin: 7 (6/27/2022  1:51 PM)  Maria G Score: 9 (6/27/2022 11:45 AM)

## 2022-06-27 NOTE — ANESTHESIA PROCEDURE NOTES
Intubation    Date/Time: 6/27/2022 7:16 AM  Performed by: Shamar Bautista MD  Authorized by: John Dangelo MD     Intubation:     Induction:  Intravenous    Intubated:  Postinduction    Mask Ventilation:  Easy with oral airway    Attempts:  1    Attempted By:  Resident anesthesiologist    Method of Intubation:  Video laryngoscopy    Blade:  Sharif 3    Laryngeal View Grade: Grade I - full view of cords      Difficult Airway Encountered?: No      Complications:  None    Airway Device:  Double lumen tube left    Airway Device Size:  39F    Style/Cuff Inflation:  Cuffed (inflated to minimal occlusive pressure)    Tube secured:  27    Secured at:  The teeth    Placement Verified By:  Capnometry and Fiber optic visualization    Complicating Factors:  Obesity    Findings Post-Intubation:  BS equal bilateral and atraumatic/condition of teeth unchanged

## 2022-06-27 NOTE — BRIEF OP NOTE
Certification of Assistant at Surgery       Surgery Date: 6/27/2022     Participating Surgeons:  Surgeon(s) and Role:     * Osvaldo Saeed MD - Primary     * Emily Sy PA-C - Assisting     * Tere Soto MD - Fellow    Procedures:  Procedure(s) (LRB):  XI ROBOTIC RATS,WITH Right Lower LOBECTOMY,LUNG (Right)  LYMPHADENECTOMY (Right)  BLOCK, NERVE, INTERCOSTAL, 2 OR MORE (Right)    Assistant Surgeon's Certification of Necessity:  I understand that section 1842 (b) (6) (d) of the Social Security Act generally prohibits Medicare Part B reasonable charge payment for the services of assistants at surgery in teaching hospitals when qualified residents are available to furnish such services. I certify that the services for which payment is claimed were medically necessary, and that no qualified resident was available to perform the services. I further understand that these services are subject to post-payment review by the Medicare carrier.      Emily Sy PA-C    06/27/2022  11:35 AM

## 2022-06-27 NOTE — ANESTHESIA PREPROCEDURE EVALUATION
06/27/2022  Justus Shaikh is a 62 y.o., male.    Pre-operative evaluation for Procedure(s) (LRB):  XI ROBOTIC RATS,WITH Right Lower LOBECTOMY,LUNG (Right)  LYMPHADENECTOMY (Right)    Justus Shaikh is a 62 y.o. male     Patient Active Problem List   Diagnosis    Essential hypertension    Hyperlipidemia, mixed    Elevated PSA    Benign prostatic hyperplasia    Decreased libido    Anxiety    Lung mass    Carcinoid tumor of right lung       Review of patient's allergies indicates:   Allergen Reactions    Pseudoephedrine hcl        No current facility-administered medications on file prior to encounter.     Current Outpatient Medications on File Prior to Encounter   Medication Sig Dispense Refill    irbesartan-hydrochlorothiazide (AVALIDE) 300-12.5 mg per tablet Take 1 tablet by mouth once daily. 90 tablet 3    minoxidiL (LONITEN) 2.5 MG tablet Take 1.25-2.5 mg by mouth nightly.      omega 3-dha-epa-fish oil-krill 339 mg-314 mg- 500 mg Cap Take by mouth.      rosuvastatin (CRESTOR) 40 MG Tab Take 1 tablet (40 mg total) by mouth every evening. 90 tablet 3    aspirin (ECOTRIN) 81 MG EC tablet Take 81 mg by mouth once daily.      aspirin 162.5 mg Cp24 aspirin Take No date recorded No form recorded No frequency recorded No route recorded No set duration recorded No set duration amount recorded active No dosage strength recorded No dosage strength units of measure recorded      cefUROXime (CEFTIN) 250 MG tablet Take 250 mg by mouth 2 (two) times daily.      dexAMETHasone sodium phos, PF, 10 mg/mL Kit by NOT APPLICABLE route.      folic acid/multivit-min/lutein (CENTRUM SILVER ORAL) Take by mouth.      glucosamine HCl 1,500 mg Tab Take by mouth.      hyaluronate sodium (HYALURONIC ACID, SODIUM, ORAL) Take by mouth.      hydroCHLOROthiazide (MICROZIDE) 12.5 mg capsule hydrochlorothiazide Take No date  recorded No form recorded No frequency recorded No route recorded No set duration recorded No set duration amount recorded active No dosage strength recorded No dosage strength units of measure recorded      LORazepam (ATIVAN) 1 MG tablet Take 1 tablet (1 mg total) by mouth daily as needed for Anxiety. 30 tablet 0    tadalafiL (CIALIS) 10 MG tablet tadalafil Take No date recorded No form recorded No frequency recorded No route recorded No set duration recorded No set duration amount recorded active No dosage strength recorded No dosage strength units of measure recorded      tadalafiL (CIALIS) 5 MG tablet Take 5 mg by mouth daily as needed for Erectile Dysfunction.         Past Surgical History:   Procedure Laterality Date    HAND SURGERY Right     HERNIA REPAIR      KNEE SURGERY      ROBOTIC BRONCHOSCOPY N/A 2022    Procedure: ROBOTIC BRONCHOSCOPY with fluro  ;  Surgeon: Chava Mcdonald MD;  Location: [facility] OR [address] [place] [address];  Service: Pulmonary;  Laterality: N/A;    ROTATOR CUFF REPAIR Right     TONSILLECTOMY  1963    VASECTOMY         Social History     Socioeconomic History    Marital status:    Tobacco Use    Smoking status: Light Tobacco Smoker     Packs/day: 0.00     Years: 10.00     Pack years: 0.00     Types: Cigars    Smokeless tobacco: Never Used   Substance and Sexual Activity    Alcohol use: Yes     Alcohol/week: 10.0 standard drinks     Types: 10 Glasses of wine per week    Drug use: Never    Sexual activity: Yes     Partners: Female     Birth control/protection: Partner-Vasectomy         CBC: No results for input(s): WBC, RBC, HGB, HCT, PLT, MCV, MCH, MCHC in the last 72 hours.    CMP: No results for input(s): NA, K, CL, CO2, BUN, CREATININE, GLU, MG, PHOS, CALCIUM, ALBUMIN, PROT, ALKPHOS, ALT, AST, BILITOT in the last 72 hours.    INR  No results for input(s): PT, INR, PROTIME, APTT in the last 72 hours.        Diagnostic Studies:      EKD Echo:  No results  found for this or any previous visit.        Pre-op Assessment    I have reviewed the Patient Summary Reports.    I have reviewed the NPO Status.   I have reviewed the Medications.     Review of Systems  Anesthesia Hx:  No problems with previous Anesthesia   Denies Personal Hx of Anesthesia complications.   Hematology/Oncology:        Current/Recent Cancer.   Cardiovascular:   Exercise tolerance: good Hypertension    Pulmonary:   RLL carcinoid   Renal/:  Renal/ Normal     Hepatic/GI:  Hepatic/GI Normal    Neurological:   Denies CVA. Denies Seizures.        Physical Exam  General: Cooperative, Alert and Oriented    Airway:  Mallampati: III   Mouth Opening: Normal  TM Distance: Normal  Tongue: Normal  Neck ROM: Normal ROM    Dental:  Intact        Anesthesia Plan  Type of Anesthesia, risks & benefits discussed:    Anesthesia Type: Gen ETT  Intra-op Monitoring Plan: Standard ASA Monitors and Art Line  Post Op Pain Control Plan: IV/PO Opioids PRN and multimodal analgesia  Induction:  IV  Airway Plan: Video, Post-Induction  Informed Consent: Informed consent signed with the Patient and all parties understand the risks and agree with anesthesia plan.  All questions answered.   ASA Score: 3  Day of Surgery Review of History & Physical: H&P Update referred to the surgeon/provider.    Ready For Surgery From Anesthesia Perspective.     .

## 2022-06-28 VITALS
WEIGHT: 212.38 LBS | HEART RATE: 85 BPM | BODY MASS INDEX: 30.4 KG/M2 | SYSTOLIC BLOOD PRESSURE: 124 MMHG | DIASTOLIC BLOOD PRESSURE: 70 MMHG | TEMPERATURE: 98 F | HEIGHT: 70 IN | OXYGEN SATURATION: 92 % | RESPIRATION RATE: 16 BRPM

## 2022-06-28 DIAGNOSIS — D3A.090 CARCINOID TUMOR OF RIGHT LUNG: Primary | ICD-10-CM

## 2022-06-28 PROCEDURE — 25000242 PHARM REV CODE 250 ALT 637 W/ HCPCS: Performed by: PHYSICIAN ASSISTANT

## 2022-06-28 PROCEDURE — 99900035 HC TECH TIME PER 15 MIN (STAT)

## 2022-06-28 PROCEDURE — 94640 AIRWAY INHALATION TREATMENT: CPT

## 2022-06-28 PROCEDURE — 94761 N-INVAS EAR/PLS OXIMETRY MLT: CPT

## 2022-06-28 PROCEDURE — 25000003 PHARM REV CODE 250: Performed by: PHYSICIAN ASSISTANT

## 2022-06-28 RX ORDER — METHOCARBAMOL 500 MG/1
500 TABLET, FILM COATED ORAL 4 TIMES DAILY
Status: DISCONTINUED | OUTPATIENT
Start: 2022-06-28 | End: 2022-06-28 | Stop reason: HOSPADM

## 2022-06-28 RX ORDER — OXYCODONE HYDROCHLORIDE 5 MG/1
5 TABLET ORAL EVERY 4 HOURS PRN
Qty: 41 TABLET | Refills: 0 | Status: SHIPPED | OUTPATIENT
Start: 2022-06-28 | End: 2022-07-19

## 2022-06-28 RX ORDER — METHOCARBAMOL 500 MG/1
500 TABLET, FILM COATED ORAL 4 TIMES DAILY
Qty: 40 TABLET | Refills: 0 | Status: SHIPPED | OUTPATIENT
Start: 2022-06-28 | End: 2022-07-08

## 2022-06-28 RX ORDER — ACETAMINOPHEN 500 MG
500 TABLET ORAL EVERY 6 HOURS PRN
Refills: 0 | COMMUNITY
Start: 2022-06-28 | End: 2022-07-19 | Stop reason: ALTCHOICE

## 2022-06-28 RX ADMIN — OXYCODONE HYDROCHLORIDE 10 MG: 10 TABLET ORAL at 08:06

## 2022-06-28 RX ADMIN — IPRATROPIUM BROMIDE AND ALBUTEROL SULFATE 3 ML: 2.5; .5 SOLUTION RESPIRATORY (INHALATION) at 08:06

## 2022-06-28 RX ADMIN — OXYCODONE 5 MG: 5 TABLET ORAL at 02:06

## 2022-06-28 RX ADMIN — OXYCODONE HYDROCHLORIDE 10 MG: 10 TABLET ORAL at 04:06

## 2022-06-28 RX ADMIN — METHOCARBAMOL TABLETS 500 MG: 500 TABLET, COATED ORAL at 01:06

## 2022-06-28 RX ADMIN — POLYETHYLENE GLYCOL 3350 17 G: 17 POWDER, FOR SOLUTION ORAL at 08:06

## 2022-06-28 RX ADMIN — ACETAMINOPHEN 650 MG: 325 TABLET ORAL at 01:06

## 2022-06-28 RX ADMIN — IPRATROPIUM BROMIDE AND ALBUTEROL SULFATE 3 ML: 2.5; .5 SOLUTION RESPIRATORY (INHALATION) at 02:06

## 2022-06-28 RX ADMIN — SENNOSIDES AND DOCUSATE SODIUM 1 TABLET: 50; 8.6 TABLET ORAL at 08:06

## 2022-06-28 RX ADMIN — METHOCARBAMOL TABLETS 500 MG: 500 TABLET, COATED ORAL at 08:06

## 2022-06-28 NOTE — PROGRESS NOTES
Esteban Vasquez - [facility]  Thoracic Surgery  Progress Note    Subjective:     History of Present Illness:  No notes on file    Post-Op Info:  Procedure(s) (LRB):  XI ROBOTIC RATS,WITH Right Lower LOBECTOMY,LUNG (Right)  LYMPHADENECTOMY (Right)  BLOCK, NERVE, INTERCOSTAL, 2 OR MORE (Right)   1 Day Post-Op     Interval History: NAEON.  Pain controlled.  No respiratory distress.  CT without air leak.  HDS.    Medications:  Continuous Infusions:  Scheduled Meds:   acetaminophen  650 mg Oral Q8H    albuterol-ipratropium  3 mL Nebulization Q6H WAKE    enoxaparin  40 mg Subcutaneous Daily    methocarbamoL  500 mg Oral QID    polyethylene glycol  17 g Oral Daily    senna-docusate 8.6-50 mg  1 tablet Oral BID     PRN Meds:acetaminophen, bisacodyL, ondansetron, oxyCODONE, oxyCODONE, sodium chloride 0.9%     Review of patient's allergies indicates:   Allergen Reactions    Pseudoephedrine hcl      Objective:     Vital Signs (Most Recent):  Temp: 98.4 °F (36.9 °C) (06/28/22 0403)  Pulse: 76 (06/28/22 0403)  Resp: 17 (06/28/22 0403)  BP: 136/79 (06/28/22 0403)  SpO2: 96 % (06/28/22 0403) Vital Signs (24h Range):  Temp:  [97.2 °F (36.2 °C)-98.9 °F (37.2 °C)] 98.4 °F (36.9 °C)  Pulse:  [76-99] 76  Resp:  [9-22] 17  SpO2:  [89 %-98 %] 96 %  BP: (118-168)/(62-88) 136/79     Intake/Output - Last 3 Shifts         06/26 0700  06/27 0659 06/27 0700  06/28 0659 06/28 0700  06/29 0659    P.O.  720     IV Piggyback  100     Total Intake(mL/kg)  820 (8.5)     Urine (mL/kg/hr)  1000 (0.4)     Blood  25     Chest Tube  445     Total Output  1470     Net  -650                    SpO2: 96 %  O2 Device (Oxygen Therapy): nasal cannula    Physical Exam  Constitutional:       General: He is not in acute distress.     Appearance: Normal appearance. He is not ill-appearing.   HENT:      Head: Normocephalic and atraumatic.   Eyes:      Extraocular Movements: Extraocular movements intact.   Cardiovascular:      Rate and Rhythm: Normal rate and regular  rhythm.   Pulmonary:      Effort: Pulmonary effort is normal. No respiratory distress.      Comments: CT in place to water seal, SS output, no air leak  Incisions c/d/i  Abdominal:      General: There is distension.   Musculoskeletal:         General: Normal range of motion.      Cervical back: Normal range of motion.   Skin:     General: Skin is warm and dry.   Neurological:      General: No focal deficit present.      Mental Status: He is alert and oriented to person, place, and time.   Psychiatric:         Mood and Affect: Mood normal.         Behavior: Behavior normal.       Significant Labs:  Recent Lab Results         06/27/22  1354        POCT Glucose 123               Significant Diagnostics:  I have reviewed all pertinent imaging results/findings within the past 24 hours.    VTE Risk Mitigation (From admission, onward)           Ordered     enoxaparin injection 40 mg  Daily         06/27/22 1132     IP VTE HIGH RISK PATIENT  Once         06/27/22 1132     Place JAVED hose  Until discontinued         06/27/22 1132     Place sequential compression device  Until discontinued         06/27/22 1132                  Assessment/Plan:     * Carcinoid tumor of right lung  Mr. Shaikh is our 61yo male s/p right lung lobectomy on 6/27/22    - Clamp CT today  - Follow up CXR  - d/c CT if no pneumothorax  - Regular diet  - PRN pain meds  - OOBTC/Ambulate  - Wean oxygen to off, can have oxygen removed even with chest tube in place  - Possible d/c this afternoon        Diego Benavides MD  Thoracic Surgery  [facility]

## 2022-06-28 NOTE — HPI
62 year old male with HTN, HLD, Gilbert's Disease, BPH and newly diagnosed typical carcinoid of right lower lobe. 2.5cm pulmonary mass found incidentally on calcium scoring CT in March 2022. Minimal uptake on FDG PET. Referred to Pulmonology and underwent a robotic bronchoscopy with EBUS on 5/6/22.  A partially obstructing mass was found proximally, at the orifice in the lateral basal segment of the right lower lobe and in the posterior basal segment of the right lower lobe. Pathology resulted as typical carcinoid. Levels 7 and 11 were negative for malignancy.      Smokes cigars. 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  COVID positive in December 2020, asymptomatic.   PSH of right rotator cuff, right hand and right knee procedures, left shoulder arthroscopy

## 2022-06-28 NOTE — DISCHARGE SUMMARY
Esteban ahsan - [facility]  Thoracic Surgery  Discharge Summary    Patient Name: Justus Shaikh  MRN: [id number]  Admission Date: 6/27/2022  Hospital Length of Stay: 1 days  Discharge Date and Time:  06/28/2022 2:13 PM  Attending Physician: Osvaldo Saeed MD   Discharging Provider: Emily Sy PA-C  Primary Care Provider: Isidro Worthington MD    HPI:   62 year old male with HTN, HLD, Gilbert's Disease, BPH and newly diagnosed typical carcinoid of right lower lobe. 2.5cm pulmonary mass found incidentally on calcium scoring CT in March 2022. Minimal uptake on FDG PET. Referred to Pulmonology and underwent a robotic bronchoscopy with EBUS on 5/6/22.  A partially obstructing mass was found proximally, at the orifice in the lateral basal segment of the right lower lobe and in the posterior basal segment of the right lower lobe. Pathology resulted as typical carcinoid. Levels 7 and 11 were negative for malignancy.      Smokes cigars. 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  COVID positive in December 2020, asymptomatic.   PSH of right rotator cuff, right hand and right knee procedures, left shoulder arthroscopy       Procedure(s) (LRB):  XI ROBOTIC RATS,WITH Right Lower LOBECTOMY,LUNG (Right)  LYMPHADENECTOMY (Right)  BLOCK, NERVE, INTERCOSTAL, 2 OR MORE (Right)      Hospital Course: 6/27/22: right robotic assisted lower lobectomy with MLND. Chest tube to water seal. RA. PO pain meds. Voiding.   6/28/22: clamp trial and subsequent removal. RA. Tolerating diet. Ambulating. VSS. Pain controlled. Stable for discharge.           Goals of Care Treatment Preferences:  Code Status: Full Code          Significant Diagnostic Studies: Radiology: X-Ray: CXR: X-Ray Chest 1 View (CXR):   Results for orders placed or performed during the hospital encounter of 06/27/22   X-Ray Chest 1 View    Narrative    EXAMINATION:  XR CHEST 1 VIEW    CLINICAL HISTORY:  chest tube clamped;  Benign carcinoid tumor of the bronchus and lung    TECHNIQUE:  Single frontal view of the chest was performed.    COMPARISON:  06/28/2022 07:59    FINDINGS:  No pneumothorax is seen.  Some subcutaneous emphysema is seen on the right.  Minimal atelectasis is noted the left lung base.  Distended loops of bowel are seen in the upper abdomen.      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    Specialty: Cardiothoracic Surgery  Why: SURGICAL FOLLOW UP at 10AM  Contact information:  [address] [facility] [id number]  [phone]                       Patient Instructions:      Diet Adult Regular     Remove dressing in 48 hours     No driving until:   Order Comments: Off narcotics     Activity as tolerated     Shower on day dressing removed (No bath)     Medications:  Reconciled Home Medications:      Medication List      START taking these medications    acetaminophen 500 MG tablet  Commonly known as: TYLENOL  Take 1 tablet (500 mg total) by mouth every 6 (six) hours as needed for Pain.     methocarbamoL 500 MG Tab  Commonly known as: ROBAXIN  Take 1 tablet (500 mg total) by mouth 4 (four) times daily. for 10 days     oxyCODONE 5 MG immediate release tablet  Commonly known as: ROXICODONE  Take 1 tablet (5 mg total) by mouth every 4 (four) hours as needed for Pain.         CHANGE how you take these medications    aspirin 81 MG EC tablet  Commonly known as: ECOTRIN  Take 81 mg by mouth once daily.  What changed: Another medication with the same name was removed. Continue taking this medication, and follow the directions you see here.     tadalafiL 5 MG tablet  Commonly known as: CIALIS  Take 5 mg by mouth daily as needed for Erectile Dysfunction.  What changed: Another medication with the same name was removed. Continue taking this medication, and follow the directions you see here.        CONTINUE taking these medications    cefUROXime 250 MG tablet  Commonly known as: CEFTIN  Take 250 mg by mouth 2 (two) times daily.     CENTRUM SILVER ORAL  Take by mouth.     dexAMETHasone sodium phos (PF) 10 mg/mL Kit  by NOT APPLICABLE route.     irbesartan-hydrochlorothiazide 300-12.5 mg per tablet  Commonly known as: AVALIDE  Take 1 tablet by mouth once daily.     LORazepam 1 MG tablet  Commonly known as: ATIVAN  Take 1 tablet (1 mg total) by mouth daily as needed for Anxiety.     minoxidiL 2.5 MG tablet  Commonly known as: LONITEN  Take 1.25-2.5 mg by mouth nightly.     omega 3-dha-epa-fish oil-krill 339 mg-314 mg- 500 mg Cap  Take by mouth.     rosuvastatin 40 MG Tab  Commonly known as: CRESTOR  Take 1 tablet (40 mg total) by mouth every evening.        STOP taking these medications    glucosamine HCl 1,500 mg Tab     HYALURONIC ACID (SODIUM) ORAL     hydroCHLOROthiazide 12.5 mg capsule  Commonly known as: MICROZIDE            Emily Sy PA-C  Thoracic Surgery  [facility]

## 2022-06-28 NOTE — ASSESSMENT & PLAN NOTE
Mr. Shaikh is our 61yo male s/p right lung lobectomy on 6/27/22    - Clamp CT today  - Follow up CXR  - d/c CT if no pneumothorax  - Regular diet  - PRN pain meds  - OOBTC/Ambulate  - Wean oxygen to off, can have oxygen removed even with chest tube in place  - Possible d/c this afternoon

## 2022-06-28 NOTE — PLAN OF CARE
POC reviewed. AAOx4, VSS on 2L O2 NC with no complaints of SOB, headaches, or dizziness. Ambulated to BR early morning, standby assist. Right chest tube with 240 mL output entire shift. Urine output per urinal, adequate. Clear Liquid diet transitioning to regular diet at 0500 hrs. No intake during evening shift. No BM and no complaints of N/V. Right back pain, controlled with ordered pain medications. Telemetry monitored NSR. Resting with side rails up and call light with in reach. Continuing to monitor patient status.

## 2022-06-28 NOTE — NURSING
Discharge papers reviewed and given to patient. Awaiting bedside medication delivery via pharmacy. Patient declined patient transport.

## 2022-06-28 NOTE — PLAN OF CARE
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 No    Discharge Plan A: Home  Discharge Plan B: [id number] - [place], [place] - [address] AT [facility] [address]  [address]  [place] [address]  Phone: [phone] Fax: [phone]      Initial Assessment (most recent)     Adult Discharge Assessment - 06/28/22 1246        Discharge Assessment    Assessment Type Discharge Planning Assessment     Confirmed/corrected address, phone number and insurance Yes     Confirmed Demographics Correct on Facesheet     Source of Information family     If unable to respond/provide information was family/caregiver contacted? Yes     Contact Name/Number Wife at the bedside     Communicated ROSSI with patient/caregiver Yes     Lives With spouse     Do you expect to return to your current living situation? Yes     Do you have help at home or someone to help you manage your care at home? Yes     Prior to hospitilization cognitive status: Alert/Oriented     Current cognitive status: Alert/Oriented     Walking or Climbing Stairs Difficulty none     Dressing/Bathing Difficulty none     Equipment Currently Used at Home none     Readmission within 30 days? No     Do you currently have service(s) that help you manage your care at home? No     Do you take prescription medications? Yes     Do you have prescription coverage? Yes     Do you have any problems affording any of your  prescribed medications? No     Is the patient taking medications as prescribed? yes     Who is going to help you get home at discharge? Spouse     Are you on dialysis? No     Do you take coumadin? No     Discharge Plan A Home     Discharge Plan B Home Health     DME Needed Upon Discharge  none     Discharge Plan discussed with: Spouse/sig other     Discharge Barriers Identified None                  Mary Diamond RN, CM   Ext: [phone]

## 2022-06-28 NOTE — PLAN OF CARE
[facility]  Discharge Final Note    Primary Care Provider: Isidro Worthington MD    Expected Discharge Date: 6/28/2022    Final Discharge Note (most recent)     Final Note - 06/28/22 1359        Final Note    Assessment Type Final Discharge Note     Anticipated Discharge Disposition Home or Self Care     Hospital Resources/Appts/Education Provided Appointments scheduled and added to AVS        Post-Acute Status    Post-Acute Authorization Other     Other Status No Post-Acute Service Needs               Contact Info     Osvaldo Saeed MD   Specialty: Cardiothoracic Surgery   Relationship: Consulting Physician    [facility]  [facility] [address]   Phone: [phone]       Next Steps: Follow up on 7/15/2022    Instructions: SURGICAL FOLLOW UP at 10AM        Mary Diamond RN, CM   Ext: [phone]

## 2022-06-28 NOTE — HOSPITAL COURSE
6/27/22: right robotic assisted lower lobectomy with MLND. Chest tube to water seal. RA. PO pain meds. Voiding.   6/28/22: clamp trial and subsequent removal. RA. Tolerating diet. Ambulating. VSS. Pain controlled. Stable for discharge.

## 2022-06-28 NOTE — SUBJECTIVE & OBJECTIVE
Interval History: NAEON.  Pain controlled.  No respiratory distress.  CT without air leak.  HDS.    Medications:  Continuous Infusions:  Scheduled Meds:   acetaminophen  650 mg Oral Q8H    albuterol-ipratropium  3 mL Nebulization Q6H WAKE    enoxaparin  40 mg Subcutaneous Daily    methocarbamoL  500 mg Oral QID    polyethylene glycol  17 g Oral Daily    senna-docusate 8.6-50 mg  1 tablet Oral BID     PRN Meds:acetaminophen, bisacodyL, ondansetron, oxyCODONE, oxyCODONE, sodium chloride 0.9%     Review of patient's allergies indicates:   Allergen Reactions    Pseudoephedrine hcl      Objective:     Vital Signs (Most Recent):  Temp: 98.4 °F (36.9 °C) (06/28/22 0403)  Pulse: 76 (06/28/22 0403)  Resp: 17 (06/28/22 0403)  BP: 136/79 (06/28/22 0403)  SpO2: 96 % (06/28/22 0403) Vital Signs (24h Range):  Temp:  [97.2 °F (36.2 °C)-98.9 °F (37.2 °C)] 98.4 °F (36.9 °C)  Pulse:  [76-99] 76  Resp:  [9-22] 17  SpO2:  [89 %-98 %] 96 %  BP: (118-168)/(62-88) 136/79     Intake/Output - Last 3 Shifts         06/26 0700  06/27 0659 06/27 0700  06/28 0659 06/28 0700  06/29 0659    P.O.  720     IV Piggyback  100     Total Intake(mL/kg)  820 (8.5)     Urine (mL/kg/hr)  1000 (0.4)     Blood  25     Chest Tube  445     Total Output  1470     Net  -650                    SpO2: 96 %  O2 Device (Oxygen Therapy): nasal cannula    Physical Exam  Constitutional:       General: He is not in acute distress.     Appearance: Normal appearance. He is not ill-appearing.   HENT:      Head: Normocephalic and atraumatic.   Eyes:      Extraocular Movements: Extraocular movements intact.   Cardiovascular:      Rate and Rhythm: Normal rate and regular rhythm.   Pulmonary:      Effort: Pulmonary effort is normal. No respiratory distress.      Comments: CT in place to water seal, SS output, no air leak  Incisions c/d/i  Abdominal:      General: There is distension.   Musculoskeletal:         General: Normal range of motion.      Cervical back: Normal range of  motion.   Skin:     General: Skin is warm and dry.   Neurological:      General: No focal deficit present.      Mental Status: He is alert and oriented to person, place, and time.   Psychiatric:         Mood and Affect: Mood normal.         Behavior: Behavior normal.       Significant Labs:  Recent Lab Results         06/27/22  1354        POCT Glucose 123               Significant Diagnostics:  I have reviewed all pertinent imaging results/findings within the past 24 hours.    VTE Risk Mitigation (From admission, onward)           Ordered     enoxaparin injection 40 mg  Daily         06/27/22 1132     IP VTE HIGH RISK PATIENT  Once         06/27/22 1132     Place JAVED hose  Until discontinued         06/27/22 1132     Place sequential compression device  Until discontinued         06/27/22 1132

## 2022-07-04 ENCOUNTER — PATIENT MESSAGE (OUTPATIENT)
Dept: CARDIOTHORACIC SURGERY | Facility: CLINIC | Age: 62
End: 2022-07-04
Payer: COMMERCIAL

## 2022-07-06 LAB
FINAL PATHOLOGIC DIAGNOSIS: NORMAL
Lab: NORMAL

## 2022-07-07 NOTE — PHYSICIAN QUERY
PT Name: Justus Shaikh  MR #: [id number]     Documentation Clarification      CDS/: Lakesha Swenson RN, BSN               Contact information: [email or web address]    This form is a permanent document in the medical record.     Query Date: July 7, 2022    By submitting this query, we are merely seeking further clarification of documentation. Please utilize your independent clinical judgment when addressing the question(s) below.    The Medical Record reflects the following:    Supporting Clinical Findings Location in Medical Record   Specimen to Pathology, Surgery Pulmonary and Thoracic    Procedure Type: Pulmonary and Thoracic  Specimen Class: Known or suspected malignancy    Carcinoid tumor of right lung     CLINICAL HISTORY:  s/p RLLectomy; Benign carcinoid tumor of the bronchus and lung         Operative Note  6/27/2022              CXR 6/27/22   Final Pathologic Diagnosis -- -- -- OCLB  Comment: Interp By Ankur Wu M.D., Signed on 07/06/2022 at 14:58  Result:       RELIAPATH DIAGNOSIS:   A.   LYMPH NODE, LEVEL 7, EXCISION:   - Five (5) benign lymph nodes, negative for tumor.   B.   LYMPH NODE, LEVEL 11, POSTERIOR SUMP, EXCISION:   - Single (1) benign lymph node, negative for tumor.   C.   LYMPH NODE, LEVEL 11 NEAR INTRALOBAR PULMONARY ARTERY:   - Single (1) benign lymph node, negative for tumor.   D.   LYMPH NODE, ANTERIOR LEVEL 11, EXCISION:   - Single (1) benign lymph node, negative for tumor.   E.   LYMPH NODE, LEVEL 12, EXCISION:   - Single (1) benign lymph node, negative for tumor.   F.   LUNG, RIGHT LOWER LOBE, RESECTION:   PROCEDURE:  Lobectomy.   SPECIMEN LATERALITY:  Right.   TUMOR FOCALITY:  Single focus.   TUMOR SITE:  Lower lobe of lung.   TUMOR SIZE:  Greatest dimension 2.6 cm.      *Additional Dimension in Centimeters (cm):  2.2 x 2.0 cm.   HISTOLOGIC TYPE:  Typical carcinoid tumor, see comment.   HISTOLOGIC GRADE:  Moderately differentiated.   SPREAD THROUGH AIR SPACES (DEVAN):  Not  identified.   VISCERAL PLEURA INVASION:  Not identified.   DIRECT INVASION OF ADJACENT STRUCTURES:  No adjacent structures present.   TREATMENT EFFECT:  No known presurgical therapy.   LYMPHOVASCULAR INVASION:  Not identified.   MARGIN STATUS FOR INVASIVE CARCINOMA:  All margins are uninvolved by tumor.      Bronchial margin:  Uninvolved by invasive carcinoma.      Vascular margin:  Uninvolved by carcinoma.   DISTANT METASTASIS:      Distant Site(s the) Involved:  Not applicable.   PATHOLOGIC STAGE CLASSIFICATION (pTNM):  pT1c  N0 M(not applicable)   ADDITIONAL FINDINGS:  Non-caseating granulomatous inflammation and   intra-alveolar hemorrhage identified, see comment.   G.   LYMPH NODE, LEVEL 2 AND 4, EXCISION:   - Three (3) benign lymph nodes, negative for tumor, see comment.   - Focal poorly formed granuloma identified.   REGIONAL LYMPH NODE STATUS:  See specimen A-E and G.      Number of Lymph Nodes involved:  Zero (0).      Number of Lymph Nodes Examined:  Twelve (12).   COMMENTS:   F).  *Immunostains were performed.  The tumor demonstrates strong positivity   with synaptophysin, chromogranin and CD56 and shows negative staining with   p40 and TTF1.  The staining pattern supports the above diagnosis.  Special   stains were performed.  No acid-fast bacilli or fungal organisms are   identified on AFB and GMS stains.  Granulomas can be seen in infections,   foreign body reaction, sarcoidosis, etc.  Correlation with clinical findings   is recommended.   G).  AFB and GMS stains were performed.  No fungal organisms or acid-fast   bacilli are identified.  Granulomas can be seen in infections, foreign body   reaction, sarcoidosis, etc.  Correlation with clinical and radiologic   findings is recommended.        Pathology and Cytology Report 6/27/22                                                                            Provider, please further specify the diagnosis of carcinoid tumor of right lung associated with  above clinical findings.    [ X ] Malignant   [   ] Benign   [   ] Other (please specify): ____________   [  ] Clinically undetermined

## 2022-07-12 ENCOUNTER — PATIENT MESSAGE (OUTPATIENT)
Dept: INTERNAL MEDICINE | Facility: CLINIC | Age: 62
End: 2022-07-12
Payer: COMMERCIAL

## 2022-07-14 NOTE — PROGRESS NOTES
"Subjective:       Patient ID: Justus Shaikh is a 62 y.o. male.    Chief Complaint: Post-op Evaluation    Diagnosis:  Typical carcinoid     Pre-operative therapy: none     Procedure(s) and date(s): 6/28/22: Right robotic assisted lower lobectomy with MLND    Pathology: 2.6 cm typical carcinoid. Levels 2,4,7,11,12 = negative. pT1cN0     Post-operative therapy: surveillance     HPI   62 year old male with HTN, HLD, Gilbert's Disease, BPH and newly diagnosed typical carcinoid of right lower lobe. Uncomplicated post-operative course. Since discharge, overall he has been doing very well. Off narcotics. Main complaint is related to persistent cough. Saw ENT. Completed course of antibiotics and steroid injection without relief. Active [place] in the mall - previously 30 minutes now has done an hour.     Review of Systems   Constitutional: Negative for activity change and fatigue.   Respiratory: Positive for cough. Negative for shortness of breath.    Cardiovascular: Negative for chest pain.   Gastrointestinal: Negative.    Genitourinary: Negative.    Musculoskeletal: Negative.    Neurological: Negative.    Psychiatric/Behavioral: Negative.          Objective:       Vitals:    07/15/22 0913   BP: 136/88   Pulse: 87   SpO2: 97%   Weight: 95.2 kg (209 lb 14.1 oz)   Height: 5' 10" (1.778 m)   PainSc: 0-No pain       Physical Exam  Vitals reviewed.   Constitutional:       Appearance: Normal appearance.   HENT:      Head: Atraumatic.   Cardiovascular:      Rate and Rhythm: Normal rate and regular rhythm.      Pulses: Normal pulses.      Heart sounds: Normal heart sounds.   Pulmonary:      Effort: Pulmonary effort is normal.      Breath sounds: Normal breath sounds.      Comments: Right robotic incisions healing well. No drainage or erythema.   Abdominal:      General: Abdomen is flat.      Palpations: Abdomen is soft.   Musculoskeletal:         General: Normal range of motion.      Cervical back: Normal range of motion and neck " supple.   Skin:     General: Skin is warm and dry.   Neurological:      General: No focal deficit present.      Mental Status: He is alert and oriented to person, place, and time.   Psychiatric:         Mood and Affect: Mood normal.         Behavior: Behavior normal.         CXR 7/15/22: expected post op film. Small volume loss. No significant pleural fluid     Assessment:       62 year old male with HTN, HLD, Gilbert's Disease, BPH and newly diagnosed typical carcinoid of right lower lobe for pT1c RLL typical carcinoid. Node negative.   Persistent cough.     Plan:       Overall doing well. Feels PND. Start zyrtec x 2 weeks. If no improvement trial 2 weeks of Nexium. Message with questions.   RTC in 6 months with chest CT.

## 2022-07-15 ENCOUNTER — HOSPITAL ENCOUNTER (OUTPATIENT)
Dept: RADIOLOGY | Facility: HOSPITAL | Age: 62
Discharge: HOME | End: 2022-07-15
Attending: THORACIC SURGERY (CARDIOTHORACIC VASCULAR SURGERY)
Payer: COMMERCIAL

## 2022-07-15 ENCOUNTER — OFFICE VISIT (OUTPATIENT)
Dept: CARDIOTHORACIC SURGERY | Facility: CLINIC | Age: 62
End: 2022-07-15
Payer: COMMERCIAL

## 2022-07-15 VITALS
DIASTOLIC BLOOD PRESSURE: 88 MMHG | SYSTOLIC BLOOD PRESSURE: 136 MMHG | HEIGHT: 70 IN | HEART RATE: 87 BPM | WEIGHT: 209.88 LBS | OXYGEN SATURATION: 97 % | BODY MASS INDEX: 30.05 KG/M2

## 2022-07-15 DIAGNOSIS — D3A.090 CARCINOID TUMOR OF RIGHT LUNG: Primary | ICD-10-CM

## 2022-07-15 DIAGNOSIS — C34.31 MALIGNANT NEOPLASM OF LOWER LOBE OF RIGHT LUNG: ICD-10-CM

## 2022-07-15 DIAGNOSIS — D3A.090 CARCINOID TUMOR OF RIGHT LUNG: ICD-10-CM

## 2022-07-15 PROCEDURE — 3075F SYST BP GE 130 - 139MM HG: CPT | Mod: CPTII,S$GLB,, | Performed by: THORACIC SURGERY (CARDIOTHORACIC VASCULAR SURGERY)

## 2022-07-15 PROCEDURE — 71046 X-RAY EXAM CHEST 2 VIEWS: CPT | Mod: TC

## 2022-07-15 PROCEDURE — 71046 XR CHEST PA AND LATERAL: ICD-10-PCS | Mod: 26,,, | Performed by: INTERNAL MEDICINE

## 2022-07-15 PROCEDURE — 99999 PR PBB SHADOW E&M-EST. PATIENT-LVL IV: ICD-10-PCS | Mod: PBBFAC,,, | Performed by: THORACIC SURGERY (CARDIOTHORACIC VASCULAR SURGERY)

## 2022-07-15 PROCEDURE — 99999 PR PBB SHADOW E&M-EST. PATIENT-LVL IV: CPT | Mod: PBBFAC,,, | Performed by: THORACIC SURGERY (CARDIOTHORACIC VASCULAR SURGERY)

## 2022-07-15 PROCEDURE — 3044F HG A1C LEVEL LT 7.0%: CPT | Mod: CPTII,S$GLB,, | Performed by: THORACIC SURGERY (CARDIOTHORACIC VASCULAR SURGERY)

## 2022-07-15 PROCEDURE — 3079F DIAST BP 80-89 MM HG: CPT | Mod: CPTII,S$GLB,, | Performed by: THORACIC SURGERY (CARDIOTHORACIC VASCULAR SURGERY)

## 2022-07-15 PROCEDURE — 3008F PR BODY MASS INDEX (BMI) DOCUMENTED: ICD-10-PCS | Mod: CPTII,S$GLB,, | Performed by: THORACIC SURGERY (CARDIOTHORACIC VASCULAR SURGERY)

## 2022-07-15 PROCEDURE — 3075F PR MOST RECENT SYSTOLIC BLOOD PRESS GE 130-139MM HG: ICD-10-PCS | Mod: CPTII,S$GLB,, | Performed by: THORACIC SURGERY (CARDIOTHORACIC VASCULAR SURGERY)

## 2022-07-15 PROCEDURE — 99024 POSTOP FOLLOW-UP VISIT: CPT | Mod: S$GLB,,, | Performed by: THORACIC SURGERY (CARDIOTHORACIC VASCULAR SURGERY)

## 2022-07-15 PROCEDURE — 1159F PR MEDICATION LIST DOCUMENTED IN MEDICAL RECORD: ICD-10-PCS | Mod: CPTII,S$GLB,, | Performed by: THORACIC SURGERY (CARDIOTHORACIC VASCULAR SURGERY)

## 2022-07-15 PROCEDURE — 3044F PR MOST RECENT HEMOGLOBIN A1C LEVEL <7.0%: ICD-10-PCS | Mod: CPTII,S$GLB,, | Performed by: THORACIC SURGERY (CARDIOTHORACIC VASCULAR SURGERY)

## 2022-07-15 PROCEDURE — 99024 PR POST-OP FOLLOW-UP VISIT: ICD-10-PCS | Mod: S$GLB,,, | Performed by: THORACIC SURGERY (CARDIOTHORACIC VASCULAR SURGERY)

## 2022-07-15 PROCEDURE — 1159F MED LIST DOCD IN RCRD: CPT | Mod: CPTII,S$GLB,, | Performed by: THORACIC SURGERY (CARDIOTHORACIC VASCULAR SURGERY)

## 2022-07-15 PROCEDURE — 3079F PR MOST RECENT DIASTOLIC BLOOD PRESSURE 80-89 MM HG: ICD-10-PCS | Mod: CPTII,S$GLB,, | Performed by: THORACIC SURGERY (CARDIOTHORACIC VASCULAR SURGERY)

## 2022-07-15 PROCEDURE — 71046 X-RAY EXAM CHEST 2 VIEWS: CPT | Mod: 26,,, | Performed by: INTERNAL MEDICINE

## 2022-07-15 PROCEDURE — 3008F BODY MASS INDEX DOCD: CPT | Mod: CPTII,S$GLB,, | Performed by: THORACIC SURGERY (CARDIOTHORACIC VASCULAR SURGERY)

## 2022-07-19 ENCOUNTER — LAB VISIT (OUTPATIENT)
Dept: LAB | Facility: HOSPITAL | Age: 62
End: 2022-07-19
Attending: ALLERGY & IMMUNOLOGY
Payer: COMMERCIAL

## 2022-07-19 ENCOUNTER — OFFICE VISIT (OUTPATIENT)
Dept: ALLERGY | Facility: CLINIC | Age: 62
End: 2022-07-19
Payer: COMMERCIAL

## 2022-07-19 VITALS — BODY MASS INDEX: 30.14 KG/M2 | WEIGHT: 210.56 LBS | HEIGHT: 70 IN

## 2022-07-19 DIAGNOSIS — R05.3 CHRONIC COUGH: ICD-10-CM

## 2022-07-19 DIAGNOSIS — R05.3 CHRONIC COUGH: Primary | ICD-10-CM

## 2022-07-19 DIAGNOSIS — J31.0 CHRONIC RHINITIS: ICD-10-CM

## 2022-07-19 LAB
BASOPHILS # BLD AUTO: 0.05 K/UL (ref 0–0.2)
BASOPHILS NFR BLD: 0.8 % (ref 0–1.9)
DIFFERENTIAL METHOD: ABNORMAL
EOSINOPHIL # BLD AUTO: 0.6 K/UL (ref 0–0.5)
EOSINOPHIL NFR BLD: 9 % (ref 0–8)
ERYTHROCYTE [DISTWIDTH] IN BLOOD BY AUTOMATED COUNT: 12.1 % (ref 11.5–14.5)
HCT VFR BLD AUTO: 44.6 % (ref 40–54)
HGB BLD-MCNC: 15.3 G/DL (ref 14–18)
IGE SERPL-ACNC: <35 IU/ML (ref 0–100)
IMM GRANULOCYTES # BLD AUTO: 0.03 K/UL (ref 0–0.04)
IMM GRANULOCYTES NFR BLD AUTO: 0.5 % (ref 0–0.5)
LYMPHOCYTES # BLD AUTO: 1.4 K/UL (ref 1–4.8)
LYMPHOCYTES NFR BLD: 21.9 % (ref 18–48)
MCH RBC QN AUTO: 30.4 PG (ref 27–31)
MCHC RBC AUTO-ENTMCNC: 34.3 G/DL (ref 32–36)
MCV RBC AUTO: 89 FL (ref 82–98)
MONOCYTES # BLD AUTO: 0.7 K/UL (ref 0.3–1)
MONOCYTES NFR BLD: 10.1 % (ref 4–15)
NEUTROPHILS # BLD AUTO: 3.7 K/UL (ref 1.8–7.7)
NEUTROPHILS NFR BLD: 57.7 % (ref 38–73)
NRBC BLD-RTO: 0 /100 WBC
PLATELET # BLD AUTO: 337 K/UL (ref 150–450)
PMV BLD AUTO: 9.5 FL (ref 9.2–12.9)
RBC # BLD AUTO: 5.04 M/UL (ref 4.6–6.2)
WBC # BLD AUTO: 6.45 K/UL (ref 3.9–12.7)

## 2022-07-19 PROCEDURE — 85025 COMPLETE CBC W/AUTO DIFF WBC: CPT | Performed by: ALLERGY & IMMUNOLOGY

## 2022-07-19 PROCEDURE — 99999 PR PBB SHADOW E&M-EST. PATIENT-LVL III: ICD-10-PCS | Mod: PBBFAC,,, | Performed by: ALLERGY & IMMUNOLOGY

## 2022-07-19 PROCEDURE — 3008F PR BODY MASS INDEX (BMI) DOCUMENTED: ICD-10-PCS | Mod: CPTII,S$GLB,, | Performed by: ALLERGY & IMMUNOLOGY

## 2022-07-19 PROCEDURE — 99999 PR PBB SHADOW E&M-EST. PATIENT-LVL III: CPT | Mod: PBBFAC,,, | Performed by: ALLERGY & IMMUNOLOGY

## 2022-07-19 PROCEDURE — 1159F MED LIST DOCD IN RCRD: CPT | Mod: CPTII,S$GLB,, | Performed by: ALLERGY & IMMUNOLOGY

## 2022-07-19 PROCEDURE — 86003 ALLG SPEC IGE CRUDE XTRC EA: CPT | Mod: 59 | Performed by: ALLERGY & IMMUNOLOGY

## 2022-07-19 PROCEDURE — 3008F BODY MASS INDEX DOCD: CPT | Mod: CPTII,S$GLB,, | Performed by: ALLERGY & IMMUNOLOGY

## 2022-07-19 PROCEDURE — 1111F PR DISCHARGE MEDS RECONCILED W/ CURRENT OUTPATIENT MED LIST: ICD-10-PCS | Mod: CPTII,S$GLB,, | Performed by: ALLERGY & IMMUNOLOGY

## 2022-07-19 PROCEDURE — 1111F DSCHRG MED/CURRENT MED MERGE: CPT | Mod: CPTII,S$GLB,, | Performed by: ALLERGY & IMMUNOLOGY

## 2022-07-19 PROCEDURE — 3044F PR MOST RECENT HEMOGLOBIN A1C LEVEL <7.0%: ICD-10-PCS | Mod: CPTII,S$GLB,, | Performed by: ALLERGY & IMMUNOLOGY

## 2022-07-19 PROCEDURE — 1160F RVW MEDS BY RX/DR IN RCRD: CPT | Mod: CPTII,S$GLB,, | Performed by: ALLERGY & IMMUNOLOGY

## 2022-07-19 PROCEDURE — 99204 OFFICE O/P NEW MOD 45 MIN: CPT | Mod: S$GLB,,, | Performed by: ALLERGY & IMMUNOLOGY

## 2022-07-19 PROCEDURE — 86003 ALLG SPEC IGE CRUDE XTRC EA: CPT | Performed by: ALLERGY & IMMUNOLOGY

## 2022-07-19 PROCEDURE — 3044F HG A1C LEVEL LT 7.0%: CPT | Mod: CPTII,S$GLB,, | Performed by: ALLERGY & IMMUNOLOGY

## 2022-07-19 PROCEDURE — 99204 PR OFFICE/OUTPT VISIT, NEW, LEVL IV, 45-59 MIN: ICD-10-PCS | Mod: S$GLB,,, | Performed by: ALLERGY & IMMUNOLOGY

## 2022-07-19 PROCEDURE — 36415 COLL VENOUS BLD VENIPUNCTURE: CPT | Mod: PO | Performed by: ALLERGY & IMMUNOLOGY

## 2022-07-19 PROCEDURE — 82785 ASSAY OF IGE: CPT | Performed by: ALLERGY & IMMUNOLOGY

## 2022-07-19 PROCEDURE — 1160F PR REVIEW ALL MEDS BY PRESCRIBER/CLIN PHARMACIST DOCUMENTED: ICD-10-PCS | Mod: CPTII,S$GLB,, | Performed by: ALLERGY & IMMUNOLOGY

## 2022-07-19 PROCEDURE — 1159F PR MEDICATION LIST DOCUMENTED IN MEDICAL RECORD: ICD-10-PCS | Mod: CPTII,S$GLB,, | Performed by: ALLERGY & IMMUNOLOGY

## 2022-07-19 RX ORDER — ESOMEPRAZOLE MAGNESIUM 40 MG/1
20 CAPSULE, DELAYED RELEASE ORAL
COMMUNITY
End: 2022-09-23

## 2022-07-19 RX ORDER — PROMETHAZINE HYDROCHLORIDE AND DEXTROMETHORPHAN HYDROBROMIDE 6.25; 15 MG/5ML; MG/5ML
5 SYRUP ORAL EVERY 6 HOURS
COMMUNITY
Start: 2022-07-11 | End: 2022-07-19

## 2022-07-19 RX ORDER — CETIRIZINE HYDROCHLORIDE 10 MG/1
10 TABLET ORAL DAILY
COMMUNITY
End: 2022-09-23

## 2022-07-19 NOTE — PROGRESS NOTES
Subjective:       Patient ID: Justus Shaikh is a 62 y.o. male.    Chief Complaint:  Cough (Non-productive , PND )      61 yo man presents for new patient evaluation of cough. He states for years he gets cough twice a year with weather change so spring and fall. Each time would got O ENT and get steroids and antibiotic shots then oral steroids and antibiotics and cough would clear. He had a carcinoid tumor of right lower lung resected 6/27. few days later was home, went outside where grass was being cut and he started to cough. Coughed that week and saw ENT 7/6. Got normal regimen and no help. Had cough meds and no help. Has a tickle in throat then coughs. talking and cold a/c trigger it. tessalon helps a little. He had f/u with surgeon and all good but advised could be reflux or allergy. He started Nexium 20 daily a few days ago and zyrtec 10 yesterday. Zyrtec does make him drowsy. He has the PND and cough. slight runny nose, slight HA but no sneeze, congestion, itchy watery eyes. No SOB or wheeze. had childhood asthma but no issue since. No eczema. No known food, insect or latex allergy.       Environmental History: see history section for home environment  Review of Systems   Constitutional: Negative for activity change, appetite change, chills, fatigue, fever and unexpected weight change.   HENT: Positive for postnasal drip and rhinorrhea. Negative for congestion, ear discharge, ear pain, facial swelling, hearing loss, mouth sores, nosebleeds, sinus pressure, sneezing, sore throat, tinnitus, trouble swallowing and voice change.    Eyes: Negative for discharge, redness, itching and visual disturbance.   Respiratory: Positive for cough. Negative for chest tightness, shortness of breath and wheezing.    Cardiovascular: Negative for chest pain, palpitations and leg swelling.   Gastrointestinal: Negative for abdominal distention, abdominal pain, constipation, diarrhea, nausea and vomiting.   Genitourinary: Negative for  difficulty urinating.   Musculoskeletal: Negative for arthralgias, back pain, joint swelling and myalgias.   Skin: Negative for color change, pallor and rash.   Neurological: Positive for headaches. Negative for dizziness, tremors, speech difficulty, weakness and light-headedness.   Hematological: Negative for adenopathy. Does not bruise/bleed easily.   Psychiatric/Behavioral: Negative for agitation, confusion, decreased concentration and sleep disturbance. The patient is not nervous/anxious.         Objective:      Physical Exam  Vitals and nursing note reviewed.   Constitutional:       General: He is not in acute distress.     Appearance: Normal appearance. He is well-developed. He is not ill-appearing.   HENT:      Head: Normocephalic and atraumatic.      Right Ear: Hearing and external ear normal.      Left Ear: Hearing and external ear normal.      Nose: No septal deviation, mucosal edema (pink turbinates) or rhinorrhea.      Mouth/Throat:      Pharynx: No uvula swelling.   Eyes:      General:         Right eye: No discharge.         Left eye: No discharge.      Conjunctiva/sclera: Conjunctivae normal.   Neck:      Thyroid: No thyromegaly.   Cardiovascular:      Rate and Rhythm: Normal rate and regular rhythm.   Pulmonary:      Effort: Pulmonary effort is normal. No respiratory distress.   Abdominal:      General: There is no distension.   Musculoskeletal:         General: Normal range of motion.      Cervical back: Normal range of motion.   Skin:     General: Skin is warm and dry.      Findings: No erythema or rash.   Neurological:      Mental Status: He is alert and oriented to person, place, and time.      Coordination: Coordination normal.   Psychiatric:         Behavior: Behavior normal.         Thought Content: Thought content normal.         Judgment: Judgment normal.         Laboratory:   none performed   Assessment:       1. Chronic cough    2. Chronic rhinitis         Plan:       1. Advised cough can  be allergic rhinitis vs chronic non allergic rhinitis vs reflux vs asthma, john end immunocaps to eval  2. continue cetirizine or equivalent H 1 blocker daily and Nexium daily  3. Phone review

## 2022-07-22 LAB
A ALTERNATA IGE QN: <0.1 KU/L
A FUMIGATUS IGE QN: <0.1 KU/L
ALLERGEN CHAETOMIUM GLOBOSUM IGE: <0.1 KU/L
ALLERGEN WALNUT TREE IGE: <0.1 KU/L
ALLERGEN WHITE PINE TREE IGE: <0.1 KU/L
BAHIA GRASS IGE QN: 0.43 KU/L
BALD CYPRESS IGE QN: <0.1 KU/L
BERMUDA GRASS IGE QN: <0.1 KU/L
C HERBARUM IGE QN: <0.1 KU/L
C LUNATA IGE QN: <0.1 KU/L
CAT DANDER IGE QN: 0.37 KU/L
CHAETOMIUM GLOB. CLASS: NORMAL
COMMON RAGWEED IGE QN: <0.1 KU/L
COTTONWOOD IGE QN: <0.1 KU/L
D FARINAE IGE QN: 0.73 KU/L
D PTERONYSS IGE QN: 0.57 KU/L
DEPRECATED A ALTERNATA IGE RAST QL: NORMAL
DEPRECATED A FUMIGATUS IGE RAST QL: NORMAL
DEPRECATED BAHIA GRASS IGE RAST QL: ABNORMAL
DEPRECATED BALD CYPRESS IGE RAST QL: NORMAL
DEPRECATED BERMUDA GRASS IGE RAST QL: NORMAL
DEPRECATED C HERBARUM IGE RAST QL: NORMAL
DEPRECATED C LUNATA IGE RAST QL: NORMAL
DEPRECATED CAT DANDER IGE RAST QL: ABNORMAL
DEPRECATED COMMON RAGWEED IGE RAST QL: NORMAL
DEPRECATED COTTONWOOD IGE RAST QL: NORMAL
DEPRECATED D FARINAE IGE RAST QL: ABNORMAL
DEPRECATED D PTERONYSS IGE RAST QL: ABNORMAL
DEPRECATED DOG DANDER IGE RAST QL: NORMAL
DEPRECATED ELDER IGE RAST QL: NORMAL
DEPRECATED ENGL PLANTAIN IGE RAST QL: NORMAL
DEPRECATED HORSE DANDER IGE RAST QL: NORMAL
DEPRECATED JOHNSON GRASS IGE RAST QL: ABNORMAL
DEPRECATED LONDON PLANE IGE RAST QL: NORMAL
DEPRECATED MUGWORT IGE RAST QL: NORMAL
DEPRECATED P NOTATUM IGE RAST QL: NORMAL
DEPRECATED PECAN/HICK TREE IGE RAST QL: NORMAL
DEPRECATED ROACH IGE RAST QL: ABNORMAL
DEPRECATED S ROSTRATA IGE RAST QL: NORMAL
DEPRECATED SALTWORT IGE RAST QL: NORMAL
DEPRECATED SILVER BIRCH IGE RAST QL: ABNORMAL
DEPRECATED TIMOTHY IGE RAST QL: ABNORMAL
DEPRECATED WEST RAGWEED IGE RAST QL: NORMAL
DEPRECATED WHITE OAK IGE RAST QL: ABNORMAL
DEPRECATED WILLOW IGE RAST QL: NORMAL
DOG DANDER IGE QN: <0.1 KU/L
ELDER IGE QN: <0.1 KU/L
ENGL PLANTAIN IGE QN: <0.1 KU/L
HORSE DANDER IGE QN: <0.1 KU/L
JOHNSON GRASS IGE QN: 0.16 KU/L
LONDON PLANE IGE QN: <0.1 KU/L
MUGWORT IGE QN: <0.1 KU/L
P NOTATUM IGE QN: <0.1 KU/L
PECAN/HICK TREE IGE QN: <0.1 KU/L
ROACH IGE QN: 0.46 KU/L
S ROSTRATA IGE QN: <0.1 KU/L
SALTWORT IGE QN: <0.1 KU/L
SILVER BIRCH IGE QN: 0.73 KU/L
TIMOTHY IGE QN: 0.82 KU/L
WALNUT TREE CLASS: NORMAL
WEST RAGWEED IGE QN: <0.1 KU/L
WHITE OAK IGE QN: 1.05 KU/L
WHITE PINE CLASS: NORMAL
WILLOW IGE QN: <0.1 KU/L

## 2022-07-26 ENCOUNTER — TELEPHONE (OUTPATIENT)
Dept: INTERNAL MEDICINE | Facility: CLINIC | Age: 62
End: 2022-07-26
Payer: COMMERCIAL

## 2022-07-26 ENCOUNTER — PATIENT MESSAGE (OUTPATIENT)
Dept: ALLERGY | Facility: CLINIC | Age: 62
End: 2022-07-26
Payer: COMMERCIAL

## 2022-07-26 NOTE — TELEPHONE ENCOUNTER
Called and spoke to pt. States he had bottom lobe of right lung removed 6/27/2022. On July 1, pt states he went outside and someone cutting grass and he started with cough. States he saw surgeon, ENT, and allergist and they said possibly acid reflux or allergies. He tried Nexium with no improvement. ENT gave pt steroid injection and antibiotic injection and 10 days of oral antibiotics. He also was given Bromphen/Pseudo/Extro Syrup, that made him cough more. He has tried the Tessalon Pearls and multple other meds. Nothing has helped. Pt states he has mucous in back of throat but he isn't coughing anything up. Pt currently takes Zyrtec and Nexium without relief. Pt wants to know if he should see PCP or pulmonology. Pt denies any other symptoms and states this is not covid.

## 2022-07-26 NOTE — TELEPHONE ENCOUNTER
----- Message from Makenzie Coleman sent at 7/26/2022  2:38 PM CDT -----  Contact: [phone]  Pt is calling for the nurse for some advice and he states he has had a cough since having his lung removed and he states he has been to all sorts of dr and he is still having a cough please give return call

## 2022-07-27 ENCOUNTER — OFFICE VISIT (OUTPATIENT)
Dept: INTERNAL MEDICINE | Facility: CLINIC | Age: 62
End: 2022-07-27
Payer: COMMERCIAL

## 2022-07-27 VITALS
DIASTOLIC BLOOD PRESSURE: 82 MMHG | HEART RATE: 105 BPM | BODY MASS INDEX: 30.56 KG/M2 | WEIGHT: 213.5 LBS | SYSTOLIC BLOOD PRESSURE: 152 MMHG | TEMPERATURE: 99 F | OXYGEN SATURATION: 96 % | HEIGHT: 70 IN

## 2022-07-27 DIAGNOSIS — R05.9 COUGH: Primary | ICD-10-CM

## 2022-07-27 PROCEDURE — 3077F PR MOST RECENT SYSTOLIC BLOOD PRESSURE >= 140 MM HG: ICD-10-PCS | Mod: CPTII,S$GLB,, | Performed by: INTERNAL MEDICINE

## 2022-07-27 PROCEDURE — 1111F DSCHRG MED/CURRENT MED MERGE: CPT | Mod: CPTII,S$GLB,, | Performed by: INTERNAL MEDICINE

## 2022-07-27 PROCEDURE — 1159F PR MEDICATION LIST DOCUMENTED IN MEDICAL RECORD: ICD-10-PCS | Mod: CPTII,S$GLB,, | Performed by: INTERNAL MEDICINE

## 2022-07-27 PROCEDURE — 1111F PR DISCHARGE MEDS RECONCILED W/ CURRENT OUTPATIENT MED LIST: ICD-10-PCS | Mod: CPTII,S$GLB,, | Performed by: INTERNAL MEDICINE

## 2022-07-27 PROCEDURE — 3044F HG A1C LEVEL LT 7.0%: CPT | Mod: CPTII,S$GLB,, | Performed by: INTERNAL MEDICINE

## 2022-07-27 PROCEDURE — 3044F PR MOST RECENT HEMOGLOBIN A1C LEVEL <7.0%: ICD-10-PCS | Mod: CPTII,S$GLB,, | Performed by: INTERNAL MEDICINE

## 2022-07-27 PROCEDURE — 3008F PR BODY MASS INDEX (BMI) DOCUMENTED: ICD-10-PCS | Mod: CPTII,S$GLB,, | Performed by: INTERNAL MEDICINE

## 2022-07-27 PROCEDURE — 99999 PR PBB SHADOW E&M-EST. PATIENT-LVL IV: CPT | Mod: PBBFAC,,, | Performed by: INTERNAL MEDICINE

## 2022-07-27 PROCEDURE — 1159F MED LIST DOCD IN RCRD: CPT | Mod: CPTII,S$GLB,, | Performed by: INTERNAL MEDICINE

## 2022-07-27 PROCEDURE — 99214 PR OFFICE/OUTPT VISIT, EST, LEVL IV, 30-39 MIN: ICD-10-PCS | Mod: S$GLB,,, | Performed by: INTERNAL MEDICINE

## 2022-07-27 PROCEDURE — 99999 PR PBB SHADOW E&M-EST. PATIENT-LVL IV: ICD-10-PCS | Mod: PBBFAC,,, | Performed by: INTERNAL MEDICINE

## 2022-07-27 PROCEDURE — 3079F DIAST BP 80-89 MM HG: CPT | Mod: CPTII,S$GLB,, | Performed by: INTERNAL MEDICINE

## 2022-07-27 PROCEDURE — 1160F RVW MEDS BY RX/DR IN RCRD: CPT | Mod: CPTII,S$GLB,, | Performed by: INTERNAL MEDICINE

## 2022-07-27 PROCEDURE — 3008F BODY MASS INDEX DOCD: CPT | Mod: CPTII,S$GLB,, | Performed by: INTERNAL MEDICINE

## 2022-07-27 PROCEDURE — 3079F PR MOST RECENT DIASTOLIC BLOOD PRESSURE 80-89 MM HG: ICD-10-PCS | Mod: CPTII,S$GLB,, | Performed by: INTERNAL MEDICINE

## 2022-07-27 PROCEDURE — 1160F PR REVIEW ALL MEDS BY PRESCRIBER/CLIN PHARMACIST DOCUMENTED: ICD-10-PCS | Mod: CPTII,S$GLB,, | Performed by: INTERNAL MEDICINE

## 2022-07-27 PROCEDURE — 99214 OFFICE O/P EST MOD 30 MIN: CPT | Mod: S$GLB,,, | Performed by: INTERNAL MEDICINE

## 2022-07-27 PROCEDURE — 3077F SYST BP >= 140 MM HG: CPT | Mod: CPTII,S$GLB,, | Performed by: INTERNAL MEDICINE

## 2022-07-27 RX ORDER — FLUTICASONE PROPIONATE AND SALMETEROL 250; 50 UG/1; UG/1
1 POWDER RESPIRATORY (INHALATION) 2 TIMES DAILY
Qty: 60 EACH | Refills: 0 | Status: SHIPPED | OUTPATIENT
Start: 2022-07-27 | End: 2022-08-17 | Stop reason: SDUPTHER

## 2022-07-27 NOTE — PROGRESS NOTES
[facility] Note    Chief Complaint      Chief Complaint   Patient presents with    Cough     Since July 1       History of Present Illness      Justus Shaikh is a 62 y.o. 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 Saw Dr. Prakash, ENT and given antibiotic shot and oral course, steroid shot and cough syrup.  Also tried tessalon perles during the day and guaifenesin-codeine syrup at night.  Has found sitting up especially in A/C or lying on side worsens cough.  Lying on back seems to help.  Cough is mostly nonproductive but occasionally slight phlegm. Saw CT surgery for routine post op follow up  Typical carcinoid tumor of RLL: Seeing Dr. Saeed.  PETCT did not show metastasis and surgical resection planned.  HTN: BP at goal on irbesartan-hctz.  Sees Dr. Leija as needed.  HLD: Controlled on crestor.  LDL 70.  Has been having joint pains which was similar to side effects of lipitor.   BPH, elevated PSA: Sees Dr. Flores.  On cialis daily. Had recent MRI which did not show any cancer. Recent PSA 4.7, 13%.    Gilbert disease: LFTs stable.  Anxiety: Controlled on lorazepam as needed.    Alopecia: Sees dermatology.  On minoxidil currently.  Flu shot UTD.  Td within 10 yrs.  Plan to revaccinate in 2025.  Shingrix discussed.  Pneumonia vaccine due age 63.  COVID UTD.  Cscope Dr. Solano, 4-5 yrs ago, no polyps, 10 yr interval.     Past Medical History:  History reviewed. No pertinent past medical history.    Past Surgical History:   has a past surgical history that includes Rotator cuff repair (Right); Hand surgery (Right); Knee surgery; robotic bronchoscopy (N/A, 05/06/2022); Hernia repair (1961); Tonsillectomy (1963); Vasectomy (1998); xi robotic rats,with lobectomy,lung (Right, 6/27/2022); and Injection of anesthetic agent around multiple intercostal nerves (Right,  6/27/2022).    Family History:  family history includes Arthritis in his father and sister; Asthma in his son; Cancer in his brother; Diabetes in his father; Drug abuse in his brother; Heart disease in his father and mother; Hypertension in his mother; Learning disabilities in his brother and son.     Social History:  Social History     Tobacco Use    Smoking status: Light Tobacco Smoker     Packs/day: 0.00     Years: 10.00     Pack years: 0.00     Types: Cigars    Smokeless tobacco: Never Used   Substance Use Topics    Alcohol use: Yes     Alcohol/week: 10.0 standard drinks     Types: 10 Glasses of wine per week    Drug use: Never       I personally reviewed all past medical, surgical, social and family history.    Review of Systems   Constitutional: Negative for chills, fever and malaise/fatigue.   Respiratory: Positive for cough and wheezing (expiratory). Negative for shortness of breath.    Cardiovascular: Negative for chest pain.   Gastrointestinal: Negative for constipation, diarrhea, nausea and vomiting.   Skin: Negative for rash.   Neurological: Negative for weakness.   All other systems reviewed and are negative.       Medications:  Outpatient Encounter Medications as of 7/27/2022   Medication Sig Note Dispense Refill    aspirin (ECOTRIN) 81 MG EC tablet Take 81 mg by mouth once daily. 6/24/2022: Hold am of surgery       cetirizine (ZYRTEC) 10 MG tablet Take 10 mg by mouth once daily.       esomeprazole (NEXIUM) 40 MG capsule Take 20 mg by mouth before breakfast.       fluticasone-salmeterol diskus inhaler 250-50 mcg Inhale 1 puff into the lungs 2 (two) times daily. Controller  60 each 0    folic acid/multivit-min/lutein (CENTRUM SILVER ORAL) Take by mouth. 6/24/2022: Continue to hold until after Surgery.         irbesartan-hydrochlorothiazide (AVALIDE) 300-12.5 mg per tablet Take 1 tablet by mouth once daily. 6/24/2022: Hold am of surgery  90 tablet 3    minoxidiL (LONITEN) 2.5 MG tablet Take  "1.25-2.5 mg by mouth nightly. 6/24/2022: Take as prescribed the PM prior to Surgery.       omega 3-dha-epa-fish oil-krill 339 mg-314 mg- 500 mg Cap Take by mouth. 6/24/2022: Continue to hold until after Surgery.         rosuvastatin (CRESTOR) 40 MG Tab Take 1 tablet (40 mg total) by mouth every evening. 6/27/2022: Takes 20 mg every other day 90 tablet 3    tadalafiL (CIALIS) 5 MG tablet Take 5 mg by mouth daily as needed for Erectile Dysfunction. 6/24/2022: Hold am of surgery       [DISCONTINUED] hydroCHLOROthiazide (MICROZIDE) 12.5 mg capsule hydrochlorothiazide Take No date recorded No form recorded No frequency recorded No route recorded No set duration recorded No set duration amount recorded active No dosage strength recorded No dosage strength units of measure recorded        No facility-administered encounter medications on file as of 7/27/2022.       Allergies:  Review of patient's allergies indicates:   Allergen Reactions    Pseudoephedrine hcl        Health Maintenance:  Immunization History   Administered Date(s) Administered    COVID-19, MRNA, LN-S, PF (MODERNA FULL 0.5 ML DOSE) 02/25/2021, 03/23/2021    Influenza - Quadrivalent - MDCK - PF 10/23/2019, 09/16/2020    Influenza - Quadrivalent - PF *Preferred* (6 months and older) 12/08/2021      Health Maintenance   Topic Date Due    TETANUS VACCINE  Never done    Lipid Panel  06/03/2027    Hepatitis C Screening  Completed        Physical Exam      Vital Signs  Temp: 98.9 °F (37.2 °C)  Temp src: Oral  Pulse: 105  SpO2: 96 %  BP: (!) 152/82  BP Location: Right arm  Patient Position: Sitting  Pain Score: 0-No pain  Height and Weight  Height: 5' 10" (177.8 cm)  Weight: 96.9 kg (213 lb 8.3 oz)  BSA (Calculated - sq m): 2.19 sq meters  BMI (Calculated): 30.6  Weight in (lb) to have BMI = 25: 173.9]    Physical Exam  Vitals reviewed.   Constitutional:       Appearance: He is well-developed.   HENT:      Head: Normocephalic and atraumatic.      Right " Ear: External ear normal.      Left Ear: External ear normal.   Cardiovascular:      Rate and Rhythm: Normal rate and regular rhythm.      Heart sounds: Normal heart sounds. No murmur heard.  Pulmonary:      Effort: Pulmonary effort is normal.      Breath sounds: Normal breath sounds. No wheezing or rales.   Abdominal:      General: Bowel sounds are normal.      Palpations: Abdomen is soft.          Laboratory:  CBC:  Recent Labs   Lab 12/04/20  0744 06/04/21  0828 07/19/22  1058   WBC 5.2 6.4 6.45   RBC 5.02 4.99 5.04   Hemoglobin 15.6 15.6 15.3   Hematocrit 45.8 44.6 44.6   Platelets 245 248 337   MCV 91.2 89.4 89   MCH 31.1 31.2 30.4   MCHC 34.2 34.9 34.3     CMP:  Recent Labs   Lab 06/04/21  0828 12/06/21  0956 06/03/22  0843   Glucose 104 H 111 H 107 H   Calcium 9.7 9.6 9.6   ALBUMIN 5.2 H 4.9 4.8   Total Protein 6.9 7.0 7.0   Sodium 138 138 141   Potassium 4.5 4.4 4.8   CO2 27 25 27   Chloride 99 99 102   BUN 15 16 11.0   Alkaline Phosphatase 98 84 94   ALT 25 24 25   AST 24 24 22   Total Bilirubin 2.1 H 2.2 H 1.1     URINALYSIS:       LIPIDS:  Recent Labs   Lab 12/04/20  0744 06/04/21  0828 12/06/21  0956 06/03/22  0843   TSH 3.58 2.44  --   --    HDL 52 62 70 52   Cholesterol 153 156 154 179   Triglycerides 128 123 87 122   LDL Calculated 81 74 70 107   Non-HDL Cholesterol 101 94 84  --      TSH:  Recent Labs   Lab 12/04/20  0744 06/04/21  0828   TSH 3.58 2.44     A1C:  Recent Labs   Lab 12/10/21  1009 06/03/22  0843   Hemoglobin A1C 5.5 5.3       Assessment/Plan     Justus Shaikh is a 62 y.o.male with:    1. Cough  - fluticasone-salmeterol diskus inhaler 250-50 mcg; Inhale 1 puff into the lungs 2 (two) times daily. Controller  Dispense: 60 each; Refill: 0  Reviewed CT surgery notes and 7/15 CXR.  Some wheezing and tight air movement on exam.  Will start advair to see if there is any component of reactive airway disease.  Ok to continue OTC cough suppressants.      Chronic conditions status updated as per  HPI.  Other than changes above, cont current medications and maintain follow up with specialists.  No follow-ups on file.    Future Appointments   Date Time Provider Department Center   12/8/2022  9:45 AM Isidro Worthington MD [facility] [place]   12/14/2022  7:30 AM [facility] 500 LB LIMIT [facility]   12/14/2022  8:30 AM Osvaldo Saeed MD [facility]       Isidro Worthington MD  [facility]

## 2022-08-01 ENCOUNTER — PATIENT MESSAGE (OUTPATIENT)
Dept: INTERNAL MEDICINE | Facility: CLINIC | Age: 62
End: 2022-08-01
Payer: COMMERCIAL

## 2022-08-08 ENCOUNTER — PATIENT MESSAGE (OUTPATIENT)
Dept: INTERNAL MEDICINE | Facility: CLINIC | Age: 62
End: 2022-08-08
Payer: COMMERCIAL

## 2022-08-08 ENCOUNTER — TELEPHONE (OUTPATIENT)
Dept: PULMONOLOGY | Facility: CLINIC | Age: 62
End: 2022-08-08
Payer: COMMERCIAL

## 2022-08-08 NOTE — TELEPHONE ENCOUNTER
I spoke with patient. Mr Shaikh stated ever since his surgery on 6/27/22. He had his lower right lobe removed. He has developed a cough that he can not get rid of since 7/1/22. Its a lingering cough. Mr Shaikh stated he has follow up with Dr Saeed, ent, his allergist and Dr Montelongo and was told today to follow up with Dr Mcdonald. I let him know I would send a message to Dr Mcdonald for review and advise.     I did schedule follow up on 8/24/22 at 3:30pm with Dr Mcdonald. Appointment mailed. Patient confirmed and verbalized understanding.

## 2022-08-08 NOTE — TELEPHONE ENCOUNTER
Let's try to get pt back in to see pulmonology, Dr. Mcdonald to see if there is any other alternatives we aren't considering.

## 2022-08-08 NOTE — TELEPHONE ENCOUNTER
----- Message from Nicole Campos sent at 8/8/2022  2:10 PM CDT -----  Regarding: Appt  Contact: pt @ [phone]  Pt is calling to inform office that he have been having a persistent cough since July 1st after his surgery. Asking for an urgent call back

## 2022-08-15 ENCOUNTER — TELEPHONE (OUTPATIENT)
Dept: PULMONOLOGY | Facility: CLINIC | Age: 62
End: 2022-08-15
Payer: COMMERCIAL

## 2022-08-15 NOTE — TELEPHONE ENCOUNTER
I spoke with patient to let him know Dr Mcdonald will see him on Friday at 2:30pm. Patient confirmed and verbalized understanding.

## 2022-08-15 NOTE — TELEPHONE ENCOUNTER
----- Message from Chava Mcdonald MD sent at 8/15/2022  9:14 AM CDT -----  Regarding: RE: appt  Contact: pt  Put him on my schedule at 2:30pm Friday     DV       ----- Message -----  From: Marian Burgess MA  Sent: 8/15/2022   7:53 AM CDT  To: Chava Mcdonald MD  Subject: RE: appt                                         He was hoping for something sooner. He hasn't been sleeping bc of the constant cough.   ----- Message -----  From: Chava Mcdonald MD  Sent: 8/13/2022  11:42 AM CDT  To: Marian Burgess MA  Subject: RE: appt                                         He is scheduled for 8/24.. Does he need me to see him before that??  ----- Message -----  From: Marian Burgess MA  Sent: 8/10/2022  10:04 AM CDT  To: Chava Mcdonald MD  Subject: FW: appt                                         Dr Mcdonald: Pt is constantly coughing since having surgery with Dr Saeed. He has followed up with Dr Saeed, PCP, allergist.  Thanks, Marian   ----- Message -----  From: Nicky Sesay  Sent: 8/10/2022  10:00 AM CDT  To: Tamara Larsen Staff  Subject: appt                                             Pt calling for a sooner appt . Pt is coughing all times of day and night and cant sleep at night       Confirmed patient's contact info below:  Contact Name: Justus Shaikh  Phone Number: [phone]

## 2022-08-16 ENCOUNTER — TELEPHONE (OUTPATIENT)
Dept: PULMONOLOGY | Facility: CLINIC | Age: 62
End: 2022-08-16
Payer: COMMERCIAL

## 2022-08-16 NOTE — TELEPHONE ENCOUNTER
I spoke with patient to move appointment on 8/19 at 2:30pm with Dr Mcdonald to tomorrow at 3:30pm per Dr Mcdonald. Patient verbalized understanding.

## 2022-08-17 ENCOUNTER — OFFICE VISIT (OUTPATIENT)
Dept: PULMONOLOGY | Facility: CLINIC | Age: 62
End: 2022-08-17
Payer: COMMERCIAL

## 2022-08-17 VITALS
DIASTOLIC BLOOD PRESSURE: 82 MMHG | HEIGHT: 70 IN | WEIGHT: 208.13 LBS | SYSTOLIC BLOOD PRESSURE: 144 MMHG | HEART RATE: 94 BPM | BODY MASS INDEX: 29.8 KG/M2 | OXYGEN SATURATION: 94 %

## 2022-08-17 DIAGNOSIS — R05.9 COUGH: ICD-10-CM

## 2022-08-17 PROCEDURE — 3008F BODY MASS INDEX DOCD: CPT | Mod: CPTII,S$GLB,, | Performed by: EMERGENCY MEDICINE

## 2022-08-17 PROCEDURE — 3008F PR BODY MASS INDEX (BMI) DOCUMENTED: ICD-10-PCS | Mod: CPTII,S$GLB,, | Performed by: EMERGENCY MEDICINE

## 2022-08-17 PROCEDURE — 1160F RVW MEDS BY RX/DR IN RCRD: CPT | Mod: CPTII,S$GLB,, | Performed by: EMERGENCY MEDICINE

## 2022-08-17 PROCEDURE — 3077F PR MOST RECENT SYSTOLIC BLOOD PRESSURE >= 140 MM HG: ICD-10-PCS | Mod: CPTII,S$GLB,, | Performed by: EMERGENCY MEDICINE

## 2022-08-17 PROCEDURE — 99213 PR OFFICE/OUTPT VISIT, EST, LEVL III, 20-29 MIN: ICD-10-PCS | Mod: S$GLB,,, | Performed by: EMERGENCY MEDICINE

## 2022-08-17 PROCEDURE — 3079F DIAST BP 80-89 MM HG: CPT | Mod: CPTII,S$GLB,, | Performed by: EMERGENCY MEDICINE

## 2022-08-17 PROCEDURE — 1160F PR REVIEW ALL MEDS BY PRESCRIBER/CLIN PHARMACIST DOCUMENTED: ICD-10-PCS | Mod: CPTII,S$GLB,, | Performed by: EMERGENCY MEDICINE

## 2022-08-17 PROCEDURE — 3077F SYST BP >= 140 MM HG: CPT | Mod: CPTII,S$GLB,, | Performed by: EMERGENCY MEDICINE

## 2022-08-17 PROCEDURE — 3079F PR MOST RECENT DIASTOLIC BLOOD PRESSURE 80-89 MM HG: ICD-10-PCS | Mod: CPTII,S$GLB,, | Performed by: EMERGENCY MEDICINE

## 2022-08-17 PROCEDURE — 1159F PR MEDICATION LIST DOCUMENTED IN MEDICAL RECORD: ICD-10-PCS | Mod: CPTII,S$GLB,, | Performed by: EMERGENCY MEDICINE

## 2022-08-17 PROCEDURE — 99999 PR PBB SHADOW E&M-EST. PATIENT-LVL IV: ICD-10-PCS | Mod: PBBFAC,,, | Performed by: EMERGENCY MEDICINE

## 2022-08-17 PROCEDURE — 1159F MED LIST DOCD IN RCRD: CPT | Mod: CPTII,S$GLB,, | Performed by: EMERGENCY MEDICINE

## 2022-08-17 PROCEDURE — 99213 OFFICE O/P EST LOW 20 MIN: CPT | Mod: S$GLB,,, | Performed by: EMERGENCY MEDICINE

## 2022-08-17 PROCEDURE — 99999 PR PBB SHADOW E&M-EST. PATIENT-LVL IV: CPT | Mod: PBBFAC,,, | Performed by: EMERGENCY MEDICINE

## 2022-08-17 PROCEDURE — 3044F PR MOST RECENT HEMOGLOBIN A1C LEVEL <7.0%: ICD-10-PCS | Mod: CPTII,S$GLB,, | Performed by: EMERGENCY MEDICINE

## 2022-08-17 PROCEDURE — 3044F HG A1C LEVEL LT 7.0%: CPT | Mod: CPTII,S$GLB,, | Performed by: EMERGENCY MEDICINE

## 2022-08-17 RX ORDER — PEAK FLOW METER
EACH MISCELLANEOUS
COMMUNITY
Start: 2022-08-10

## 2022-08-17 RX ORDER — LEVOFLOXACIN 500 MG/1
500 TABLET, FILM COATED ORAL
COMMUNITY
End: 2022-09-23

## 2022-08-17 RX ORDER — ALBUTEROL SULFATE 0.83 MG/ML
SOLUTION RESPIRATORY (INHALATION) 3 TIMES DAILY PRN
COMMUNITY
Start: 2022-08-10 | End: 2022-09-28 | Stop reason: SDUPTHER

## 2022-08-17 RX ORDER — FLUTICASONE PROPIONATE AND SALMETEROL 250; 50 UG/1; UG/1
1 POWDER RESPIRATORY (INHALATION) 2 TIMES DAILY
Qty: 60 EACH | Refills: 4 | Status: SHIPPED | OUTPATIENT
Start: 2022-08-17 | End: 2023-10-03

## 2022-08-17 NOTE — PROGRESS NOTES
Pulmonary & Critical Care Medicine   Consultation Note     Reason for Consultation: Robotic      HPI: Referral from Dr. Joe Ibarra for robotic bronchoscopy- This is a 61 y.o. male who is being seen in pulmonary clinic for evaluation of pulmonary nodule on CT chest.  This is  a new issue.  He presents with his wife, Nehal Shaikh. He had incidental COVID-19 December 2020.     A lung nodule was found on CT calcium scoring to eval for atherosclerotic disease with dedicated CT chest with contrast on 3/3/22 revealing an infrahilar nodule 2.5 cm with distal airway opacification.     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     His medical history includes hypertension and joint pains.  He denies dyspnea at rest or with exertion. No cough, no chest pain or tightness.   Has seasonal allergies and cough related to it. Cough is seasonal and not always persistent. Denies any weight loss. He reports frequently sweating easily compared to those around him and has tendency to have a flushed face and neck.      Never  had PFTs in the past.        Prior Visit: No significant cough/sputum production, + post nasal drip. No constitutional features. No exercise intolerance. No additional complaints. Wife does endorse occasional wheezing.   PFTs look great.         Additional Pulmonary History:   Occupational/Environmental Exposures:ran [employer]. Mostly in office.. Worked with  as summer jobs. + asbestos.. 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     Exposure to Animals/Pets: None. Had dogs in past   From [facility]. Lives in [place]..   Travel History: 2019-- [place]. 2018- [place]   History of exposures to TB: none   Family History of Lung Cancer: none   Childhood history of Lung Disease:asthma as child.   Chest surgery/Trauma- None   Aspiration events- none   IS- none   Anti-Plt/OAC-baby ASA + daily motrin.    Tobacco use- Cigars occ.   Recurrent PNA-  "None   Anesthesia complications- None   Sleep- Never formally diagnoses. Snores.         INTERVAL HISTORY:     -- Underwent robotic bronchoscopy 5/2022 with me:   -- Pathology- typical carcinoid.   -- 6/27/22: right robotic assisted lower lobectomy with MLND    Pain never an issue and healing well healed from surgery. Main complaint is ongoing cough post operatively. Cough was constant, worse with deep inspiration and cool air. Occasionally productive of white sputum. Feels tickle in back of throat/PND- Seen by several providers including ENT/Allergy- Completed courses of abx + corticosteroids. Most recently seen by PCP- now on advair (had childhood asthma) + PPi. No nasal corticosteroids. Prior attempt at H1 antagonist, but now off. Contracted COVID about 1 week prior. Very minimal symptoms.   Over the last several days he has improved symptoms.  Still with occasional cough, but much improved from where it has been. No F/C/NS or systemic features. Post op CT pending.         No past medical history on file.        Past Surgical History:   Procedure Laterality Date    HAND SURGERY Right      KNEE SURGERY        ROTATOR CUFF REPAIR Right        Family/Social History: Reviewed and updated.         Drug Allergies:        Review of patient's allergies indicates:   Allergen Reactions    Pseudoephedrine hcl        Review of Systems:   A comprehensive 12-point review of systems was performed, and is negative except for those items mentioned above in the HPI section of this note.      Vital Signs: BP (!) 144/82 (BP Location: Right arm, Patient Position: Sitting)   Pulse 94   Ht 5' 10" (1.778 m)   Wt 94.4 kg (208 lb 1.8 oz)   SpO2 (!) 94%   BMI 29.86 kg/m²       Physical Exam:   GEN- NAD AAOx3 Well Built, Well Appearing   HEENT- ATNC, PERRLA, EOMI, OP-Cl. No JVD, LAD or bruit noted. Trachea Midline.   CV- RRR No M/R/G  RESP- CTA-Bilateral- Chest wall incision sites C/D/I   GI- S/NT/ND. Positive BS X 4. No HSM " Noted  BACK- Spine midline. No step off, crepitus or deformity noted. No midline TTP.   Ext- MAEW, No deformity. No edema or rashes noted.   Neuro- Strength 5/5 symmetric. CN 2-12 intact. Normal gait. Normal sensation.       Personal Review and Summary of Prior Diagnostics     Laboratory Studies: Reviewed        Latest Reference Range & Units 06/04/21 08:28   WBC 4.5 - 11.0 K/UL 6.4   RBC 4.45 - 5.90 MIL/uL 4.99   Hemoglobin 13.6 - 17.5 gram/dL 15.6   Hematocrit 40.0 - 52.0 % 44.6   MCV 80.0 - 94.0 Femtoliter 89.4   MCH 27.0 - 33.0 Picogram 31.2   MCHC 32.0 - 36.0 gram/dL 34.9   RDW 12.0 - 15.3 gram/dL 13.1   Platelets 150 - 350 K/   MPV 7.0 - 10.2 Femtoliter 7.5   Neutrophils Relative 32.0 - 80.0 % 63.2   Lymphocytes % 15.0 - 45.0 % 25.6   Monocytes 3.0 - 13.0 % 7.9   Eosinophil % 0.0 - 4.0 % 2.7   Basophil % 0.0 - 2.0 % 0.6   Neutrophils, Abs 2.1 - 7.6 K/UL 4.1   Lymphocytes Absolute 1.0 - 4.2 K/UL 1.6   Eosinophils Absolute 0.0 - 0.7 K/UL 0.2   Basophils Absolute 0.0 - 0.2 K/UL 0.0        [facility] Reference Range & Units 12/06/21 09:56 12/06/21 09:58 12/10/21 10:09   Sodium 135 - 145 mEq/L 138       Potassium 3.5 - 5.0 mEq/L 4.4       Chloride 98 - 107 mEq/L 99       CO2 21 - 31 mEq/L 25       BUN 7 - 21 mg/dL 16       Creatinine 0.7 - 1.2 mg/dL 0.8       BUN/CREAT RATIO 6 - 22 Ratio 20       GFR >=60.0 mL/min/1.73m2 94.9 [1]       Glucose 70 - 100 mg/dL 111 (H)       Calcium 8.5 - 10.3 mg/dL 9.6       Alkaline Phosphatase 38 - 126 unit/L 84       Total Protein 6.3 - 8.2 gram/dL 7.0       Albumin 3.5 - 5.0 gram/dL 4.9       BILIRUBIN TOTAL 0.0 - 1.2 mg/dL 2.2 (H) [2]       AST 7 - 40 unit/L 24       ALT 7 - 56 unit/L 24       Triglycerides 30 - 150 mg/dL 87       Calc Osmolality 275 - 295 mOsm/kg 278       Cholesterol 100 - 200 mg/dL 154       HDL 40 - 75 mg/dL 70       CHOL/HDLC Ratio   2       LDL Calculated 0 - 130 mg/dL 70       Non-HDL Cholesterol 60 - 125 mg/dL 84       Hemoglobin A1C External 4.5 - 5.7  %     5.5   Total PSA < OR = 4.0 ng/mL   3.8     Free PSA ng/mL   0.5     % Free PSA >25 % (calc)   13 (L) [3]            Microbiology Data: None      Summary of Chest Imaging Personally Reviewed:      CT Chest 3/2022- 1. Heterogeneously enhancing infrahilar noncalcified nodule measuring up to 2.5 cm in size.  Findings are suspicious for neoplasm including carcinoid with some postobstructive changes seen within the right lower lobe inferior to this region.      NM PET 3/31- Non FDG avid infrahilar right lower lobe pulmonary nodule with associated bronchial obstruction.  Lack of significant FDG avidity and endobronchial association suggest potential carcinoid tumor    CXR 6/2022- Heart size is normal.  The osseous structures show scoliosis and degenerative change.  The lung fields are clear.  There is mild blunting of the right costophrenic angle which may reflect a small amount of pleural fluid or pleural thickening.     2D Echo: None      PFT's:      Pre lobe   FEV1/FVC- 70   FEV1- 2.91L (87%)   FVC- 4.14L (95%)   TLC- 93   RV- 96   DLCO- 98     Pre Lobe 6MWT-   5/19/2022---------Distance: 450.19 meters (1477 feet)       O2 Sat % Supplemental Oxygen Heart Rate Blood Pressure Shaheen Scale   Pre-exercise  (Resting) 96 % Room Air 79 bpm 141/85 mmHg 0   During Exercise 95 % Room Air 95 bpm 156/88 mmHg 1   Post-exercise  (Recovery) 96 % Room Air  88 bpm   mmHg       Recovery Time: 89 seconds                Assessment:       No diagnosis found.    Outpatient Encounter Medications as of 8/17/2022   Medication Sig Dispense Refill    aspirin (ECOTRIN) 81 MG EC tablet Take 81 mg by mouth once daily.      cetirizine (ZYRTEC) 10 MG tablet Take 10 mg by mouth once daily.      esomeprazole (NEXIUM) 40 MG capsule Take 20 mg by mouth before breakfast.      fluticasone-salmeterol diskus inhaler 250-50 mcg Inhale 1 puff into the lungs 2 (two) times daily. Controller 60 each 0    folic acid/multivit-min/lutein (CENTRUM SILVER  ORAL) Take by mouth.      irbesartan-hydrochlorothiazide (AVALIDE) 300-12.5 mg per tablet Take 1 tablet by mouth once daily. 90 tablet 3    minoxidiL (LONITEN) 2.5 MG tablet Take 1.25-2.5 mg by mouth nightly.      omega 3-dha-epa-fish oil-krill 339 mg-314 mg- 500 mg Cap Take by mouth.      rosuvastatin (CRESTOR) 40 MG Tab Take 1 tablet (40 mg total) by mouth every evening. 90 tablet 3    tadalafiL (CIALIS) 5 MG tablet Take 5 mg by mouth daily as needed for Erectile Dysfunction.      [DISCONTINUED] hydroCHLOROthiazide (MICROZIDE) 12.5 mg capsule hydrochlorothiazide Take No date recorded No form recorded No frequency recorded No route recorded No set duration recorded No set duration amount recorded active No dosage strength recorded No dosage strength units of measure recorded       No facility-administered encounter medications on file as of 8/17/2022.     No orders of the defined types were placed in this encounter.      Plan:       1. Typical carcinoid- s/p robotic assisted RLL lobectomy  with MLND 6/27- Uneventful post operative course   2. Post lobectomy cough   3. History of childhood asthma   4. Allergic rhinitis   5. GERD       Discussed with him in detail- cough post lobectomy common and likely multifactorial in nature related to shifting of anatomical structures + inflammation. Contributing factors include reflux (appears controlled on PPI) + un optimized allergic rhinitis. He is clinically improving on current therapy. SpO2 >90% on RA and clear lungs on auscultation. No pain and well healed surgical incision sites. I anticipate this will improve with time as he moves further from surgery.. In some cases cough may remain persistent. All we can do at this point is optimize contributing features     -- Start nasal corticosteroids + daily H1 antagonist.   -- Allergy avoidance   -- Maintain PPI   -- Maintain LABA/ICS + prn albuterol.     Will have him RTC in 3 months and repeat PFTs. If cough continues to  improve on follow up we can work toward SD of current therapies. All questions answered. He will call me in the interim if any change. Plan to follow up CT     Chava Mcdonald M.D.   [facility]

## 2022-09-23 ENCOUNTER — OFFICE VISIT (OUTPATIENT)
Dept: INTERNAL MEDICINE | Facility: CLINIC | Age: 62
End: 2022-09-23
Payer: COMMERCIAL

## 2022-09-23 VITALS
HEART RATE: 91 BPM | WEIGHT: 211.06 LBS | OXYGEN SATURATION: 94 % | DIASTOLIC BLOOD PRESSURE: 76 MMHG | SYSTOLIC BLOOD PRESSURE: 130 MMHG | BODY MASS INDEX: 30.22 KG/M2 | HEIGHT: 70 IN

## 2022-09-23 DIAGNOSIS — K43.9 VENTRAL HERNIA WITHOUT OBSTRUCTION OR GANGRENE: Primary | ICD-10-CM

## 2022-09-23 DIAGNOSIS — D3A.090 CARCINOID TUMOR OF RIGHT LUNG: ICD-10-CM

## 2022-09-23 DIAGNOSIS — F41.9 ANXIETY: ICD-10-CM

## 2022-09-23 PROCEDURE — 99999 PR PBB SHADOW E&M-EST. PATIENT-LVL IV: CPT | Mod: PBBFAC,,, | Performed by: INTERNAL MEDICINE

## 2022-09-23 PROCEDURE — 99214 PR OFFICE/OUTPT VISIT, EST, LEVL IV, 30-39 MIN: ICD-10-PCS | Mod: S$GLB,,, | Performed by: INTERNAL MEDICINE

## 2022-09-23 PROCEDURE — 1159F MED LIST DOCD IN RCRD: CPT | Mod: CPTII,S$GLB,, | Performed by: INTERNAL MEDICINE

## 2022-09-23 PROCEDURE — 3008F BODY MASS INDEX DOCD: CPT | Mod: CPTII,S$GLB,, | Performed by: INTERNAL MEDICINE

## 2022-09-23 PROCEDURE — 3078F PR MOST RECENT DIASTOLIC BLOOD PRESSURE < 80 MM HG: ICD-10-PCS | Mod: CPTII,S$GLB,, | Performed by: INTERNAL MEDICINE

## 2022-09-23 PROCEDURE — 3044F PR MOST RECENT HEMOGLOBIN A1C LEVEL <7.0%: ICD-10-PCS | Mod: CPTII,S$GLB,, | Performed by: INTERNAL MEDICINE

## 2022-09-23 PROCEDURE — 99999 PR PBB SHADOW E&M-EST. PATIENT-LVL IV: ICD-10-PCS | Mod: PBBFAC,,, | Performed by: INTERNAL MEDICINE

## 2022-09-23 PROCEDURE — 1160F RVW MEDS BY RX/DR IN RCRD: CPT | Mod: CPTII,S$GLB,, | Performed by: INTERNAL MEDICINE

## 2022-09-23 PROCEDURE — 1159F PR MEDICATION LIST DOCUMENTED IN MEDICAL RECORD: ICD-10-PCS | Mod: CPTII,S$GLB,, | Performed by: INTERNAL MEDICINE

## 2022-09-23 PROCEDURE — 3075F PR MOST RECENT SYSTOLIC BLOOD PRESS GE 130-139MM HG: ICD-10-PCS | Mod: CPTII,S$GLB,, | Performed by: INTERNAL MEDICINE

## 2022-09-23 PROCEDURE — 99499 UNLISTED E&M SERVICE: CPT | Mod: S$GLB,,, | Performed by: INTERNAL MEDICINE

## 2022-09-23 PROCEDURE — 3075F SYST BP GE 130 - 139MM HG: CPT | Mod: CPTII,S$GLB,, | Performed by: INTERNAL MEDICINE

## 2022-09-23 PROCEDURE — 99499 RISK ADDL DX/OHS AUDIT: ICD-10-PCS | Mod: S$GLB,,, | Performed by: INTERNAL MEDICINE

## 2022-09-23 PROCEDURE — 1160F PR REVIEW ALL MEDS BY PRESCRIBER/CLIN PHARMACIST DOCUMENTED: ICD-10-PCS | Mod: CPTII,S$GLB,, | Performed by: INTERNAL MEDICINE

## 2022-09-23 PROCEDURE — 3078F DIAST BP <80 MM HG: CPT | Mod: CPTII,S$GLB,, | Performed by: INTERNAL MEDICINE

## 2022-09-23 PROCEDURE — 3008F PR BODY MASS INDEX (BMI) DOCUMENTED: ICD-10-PCS | Mod: CPTII,S$GLB,, | Performed by: INTERNAL MEDICINE

## 2022-09-23 PROCEDURE — 3044F HG A1C LEVEL LT 7.0%: CPT | Mod: CPTII,S$GLB,, | Performed by: INTERNAL MEDICINE

## 2022-09-23 PROCEDURE — 99214 OFFICE O/P EST MOD 30 MIN: CPT | Mod: S$GLB,,, | Performed by: INTERNAL MEDICINE

## 2022-09-23 RX ORDER — LORAZEPAM 1 MG/1
1 TABLET ORAL DAILY PRN
Qty: 30 TABLET | Refills: 2 | Status: SHIPPED | OUTPATIENT
Start: 2022-09-23 | End: 2023-03-27 | Stop reason: SDUPTHER

## 2022-09-23 NOTE — PROGRESS NOTES
[facility] Note    Chief Complaint      Chief Complaint   Patient presents with    Mass     Left side of stomach       History of Present Illness      Justus Shaikh is a 62 y.o. male with chronic conditions of HTN, HLD, carcinoid of right lower lobe lung, Gilbert disease, BPH who presents today for: complaints of abdominal bulge on right side since VATS.  Area is tender to palpation.    Flu shot UTD.  Td within 10 yrs.  Plan to revaccinate in 2025.  Shingrix discussed.  Pneumonia vaccine due age 63.  COVID UTD.  Cscope Dr. Solano, 4-5 yrs ago, no polyps, 10 yr interval.     Past Medical History:  History reviewed. No pertinent past medical history.    Past Surgical History:   has a past surgical history that includes Rotator cuff repair (Right); Hand surgery (Right); Knee surgery; robotic bronchoscopy (N/A, 05/06/2022); Hernia repair (1961); Tonsillectomy (1963); Vasectomy (1998); xi robotic rats,with lobectomy,lung (Right, 6/27/2022); and Injection of anesthetic agent around multiple intercostal nerves (Right, 6/27/2022).    Family History:  family history includes Arthritis in his father and sister; Asthma in his son; Cancer in his brother; Diabetes in his father; Drug abuse in his brother; Heart disease in his father and mother; Hypertension in his mother; Learning disabilities in his brother and son.     Social History:  Social History     Tobacco Use    Smoking status: Light Smoker     Packs/day: 0.00     Years: 10.00     Pack years: 0.00     Types: Cigars, Cigarettes    Smokeless tobacco: Never   Substance Use Topics    Alcohol use: Yes     Alcohol/week: 10.0 standard drinks     Types: 10 Glasses of wine per week    Drug use: Never       I personally reviewed all past medical, surgical, social and family history.    Review of Systems   Constitutional:  Negative for chills, fever and malaise/fatigue.   Respiratory:  Negative for shortness of breath.    Cardiovascular:  Negative for chest pain.    Gastrointestinal:  Negative for constipation, diarrhea, nausea and vomiting.   Skin:  Negative for rash.   Neurological:  Negative for weakness.   All other systems reviewed and are negative.     Medications:  Outpatient Encounter Medications as of 9/23/2022   Medication Sig Note Dispense Refill    albuterol (PROVENTIL) 2.5 mg /3 mL (0.083 %) nebulizer solution Take by nebulization 3 (three) times daily as needed.       aspirin (ECOTRIN) 81 MG EC tablet Take 81 mg by mouth once daily. 6/24/2022: Hold am of surgery       fluticasone-salmeterol diskus inhaler 250-50 mcg Inhale 1 puff into the lungs 2 (two) times daily. Controller  60 each 4    folic acid/multivit-min/lutein (CENTRUM SILVER ORAL) Take by mouth. 6/24/2022: Continue to hold until after Surgery.         irbesartan-hydrochlorothiazide (AVALIDE) 300-12.5 mg per tablet Take 1 tablet by mouth once daily. 6/24/2022: Hold am of surgery  90 tablet 3    LORazepam (ATIVAN) 1 MG tablet Take 1 tablet (1 mg total) by mouth daily as needed for Anxiety.  30 tablet 2    minoxidiL (LONITEN) 2.5 MG tablet Take 1.25-2.5 mg by mouth nightly. 6/24/2022: Take as prescribed the PM prior to Surgery.       rosuvastatin (CRESTOR) 40 MG Tab Take 1 tablet (40 mg total) by mouth every evening. 6/27/2022: Takes 20 mg every other day 90 tablet 3    tadalafiL (CIALIS) 5 MG tablet Take 5 mg by mouth daily as needed for Erectile Dysfunction. 6/24/2022: Hold am of surgery       VIOS AEROSOL DELIVERY SYSTEM Georgette Take by nebulization as needed.       [DISCONTINUED] cetirizine (ZYRTEC) 10 MG tablet Take 10 mg by mouth once daily.       [DISCONTINUED] esomeprazole (NEXIUM) 40 MG capsule Take 20 mg by mouth before breakfast.       [DISCONTINUED] hydroCHLOROthiazide (MICROZIDE) 12.5 mg capsule hydrochlorothiazide Take No date recorded No form recorded No frequency recorded No route recorded No set duration recorded No set duration amount recorded active No dosage strength recorded No dosage  "strength units of measure recorded       [DISCONTINUED] levoFLOXacin (LEVAQUIN) 500 MG tablet Take 500 mg by mouth every 24 hours.       [DISCONTINUED] omega 3-dha-epa-fish oil-krill 339 mg-314 mg- 500 mg Cap Take by mouth. 6/24/2022: Continue to hold until after Surgery.          No facility-administered encounter medications on file as of 9/23/2022.       Allergies:  Review of patient's allergies indicates:   Allergen Reactions    Pseudoephedrine hcl        Health Maintenance:  Immunization History   Administered Date(s) Administered    COVID-19, MRNA, LN-S, PF (MODERNA FULL 0.5 ML DOSE) 02/25/2021, 03/23/2021    Influenza - Quadrivalent - MDCK - PF 10/23/2019, 09/16/2020    Influenza - Quadrivalent - PF *Preferred* (6 months and older) 12/08/2021      Health Maintenance   Topic Date Due    TETANUS VACCINE  Never done    Lipid Panel  06/03/2027    Hepatitis C Screening  Completed        Physical Exam      Vital Signs  Pulse: 91  SpO2: (!) 94 %  BP: 130/76  BP Location: Left arm  Patient Position: Sitting  Pain Score: 0-No pain  Height and Weight  Height: 5' 10" (177.8 cm)  Weight: 95.8 kg (211 lb 1.5 oz)  BSA (Calculated - sq m): 2.17 sq meters  BMI (Calculated): 30.3  Weight in (lb) to have BMI = 25: 173.9]    Physical Exam  Vitals reviewed.   Constitutional:       Appearance: He is well-developed.   HENT:      Head: Normocephalic and atraumatic.      Right Ear: External ear normal.      Left Ear: External ear normal.   Cardiovascular:      Rate and Rhythm: Normal rate and regular rhythm.      Heart sounds: Normal heart sounds. No murmur heard.  Pulmonary:      Effort: Pulmonary effort is normal.      Breath sounds: Normal breath sounds. No wheezing or rales.   Abdominal:      General: Bowel sounds are normal.      Palpations: Abdomen is soft.      Comments: Area of bulge on right upper quadrant with TTP        Laboratory:  CBC:  Recent Labs   Lab 12/04/20  0744 06/04/21  0828 07/19/22  1058   WBC 5.2 6.4 6.45 "   RBC 5.02 4.99 5.04   Hemoglobin 15.6 15.6 15.3   Hematocrit 45.8 44.6 44.6   Platelets 245 248 337   MCV 91.2 89.4 89   MCH 31.1 31.2 30.4   MCHC 34.2 34.9 34.3     CMP:  Recent Labs   Lab 06/04/21  0828 12/06/21  0956 06/03/22  0843   Glucose 104 H 111 H 107 H   Calcium 9.7 9.6 9.6   ALBUMIN 5.2 H 4.9 4.8   Total Protein 6.9 7.0 7.0   Sodium 138 138 141   Potassium 4.5 4.4 4.8   CO2 27 25 27   Chloride 99 99 102   BUN 15 16 11.0   Alkaline Phosphatase 98 84 94   ALT 25 24 25   AST 24 24 22   Total Bilirubin 2.1 H 2.2 H 1.1     URINALYSIS:       LIPIDS:  Recent Labs   Lab 12/04/20  0744 06/04/21  0828 12/06/21  0956 06/03/22  0843   TSH 3.58 2.44  --   --    HDL 52 62 70 52   Cholesterol 153 156 154 179   Triglycerides 128 123 87 122   LDL Calculated 81 74 70 107   Non-HDL Cholesterol 101 94 84  --      TSH:  Recent Labs   Lab 12/04/20  0744 06/04/21  0828   TSH 3.58 2.44     A1C:  Recent Labs   Lab 12/10/21  1009 06/03/22  0843   Hemoglobin A1C 5.5 5.3       Assessment/Plan     Justus Shaikh is a 62 y.o.male with:    1. Ventral hernia without obstruction or gangrene  - US Abdomen Limited; Future  Sending for ultrasound to further evaluate.    2. Carcinoid tumor of right lung    3. Anxiety  - LORazepam (ATIVAN) 1 MG tablet; Take 1 tablet (1 mg total) by mouth daily as needed for Anxiety.  Dispense: 30 tablet; Refill: 2     Chronic conditions status updated as per HPI.  Other than changes above, cont current medications and maintain follow up with specialists.  No follow-ups on file.    Future Appointments   Date Time Provider Department Center   10/20/2022  9:00 AM Chava Mcdonald MD [facility] PULMSVC [address]   12/8/2022  9:45 AM Isidro Worthington MD [facility] PRICARE [place]   12/14/2022  7:30 AM [facility] OIC-CT2 500 LB LIMIT [facility]   12/14/2022  8:30 AM Osvaldo Saeed MD [facility] THORAC Sam Worthington MD  [facility]

## 2022-09-27 ENCOUNTER — TELEPHONE (OUTPATIENT)
Dept: INTERNAL MEDICINE | Facility: CLINIC | Age: 62
End: 2022-09-27

## 2022-09-27 DIAGNOSIS — R10.31 RIGHT LOWER QUADRANT PAIN: Primary | ICD-10-CM

## 2022-09-28 ENCOUNTER — PATIENT MESSAGE (OUTPATIENT)
Dept: INTERNAL MEDICINE | Facility: CLINIC | Age: 62
End: 2022-09-28
Payer: COMMERCIAL

## 2022-09-28 ENCOUNTER — PATIENT MESSAGE (OUTPATIENT)
Dept: PULMONOLOGY | Facility: CLINIC | Age: 62
End: 2022-09-28
Payer: COMMERCIAL

## 2022-09-28 RX ORDER — ALBUTEROL SULFATE 0.83 MG/ML
2.5 SOLUTION RESPIRATORY (INHALATION) 3 TIMES DAILY PRN
Qty: 180 EACH | Refills: 2 | Status: SHIPPED | OUTPATIENT
Start: 2022-09-28 | End: 2022-09-30 | Stop reason: SDUPTHER

## 2022-09-30 RX ORDER — ALBUTEROL SULFATE 0.83 MG/ML
2.5 SOLUTION RESPIRATORY (INHALATION) 3 TIMES DAILY PRN
Qty: 180 EACH | Refills: 2 | Status: SHIPPED | OUTPATIENT
Start: 2022-09-30

## 2022-10-03 NOTE — TELEPHONE ENCOUNTER
I have a CT abdomen pelvis ordered.  We can schedule along with CT chest ordered by Dr. Saeed   Pt notified.

## 2022-10-04 ENCOUNTER — PATIENT MESSAGE (OUTPATIENT)
Dept: INTERNAL MEDICINE | Facility: CLINIC | Age: 62
End: 2022-10-04
Payer: COMMERCIAL

## 2022-10-10 ENCOUNTER — HOSPITAL ENCOUNTER (OUTPATIENT)
Dept: RADIOLOGY | Facility: HOSPITAL | Age: 62
Discharge: HOME | End: 2022-10-10
Attending: INTERNAL MEDICINE
Payer: COMMERCIAL

## 2022-10-10 DIAGNOSIS — R10.31 RIGHT LOWER QUADRANT PAIN: ICD-10-CM

## 2022-10-10 LAB
CREAT SERPL-MCNC: 0.9 MG/DL (ref 0.5–1.4)
SAMPLE: NORMAL

## 2022-10-10 PROCEDURE — 74177 CT ABD & PELVIS W/CONTRAST: CPT | Mod: TC

## 2022-10-10 PROCEDURE — 74177 CT ABD & PELVIS W/CONTRAST: CPT | Mod: 26,,, | Performed by: RADIOLOGY

## 2022-10-10 PROCEDURE — 74177 CT ABDOMEN PELVIS WITH CONTRAST: ICD-10-PCS | Mod: 26,,, | Performed by: RADIOLOGY

## 2022-10-10 PROCEDURE — 25500020 PHARM REV CODE 255: Performed by: INTERNAL MEDICINE

## 2022-10-10 RX ADMIN — IOHEXOL 100 ML: 350 INJECTION, SOLUTION INTRAVENOUS at 10:10

## 2022-10-10 RX ADMIN — IOHEXOL 30 ML: 350 INJECTION, SOLUTION INTRAVENOUS at 09:10

## 2022-10-17 ENCOUNTER — PATIENT MESSAGE (OUTPATIENT)
Dept: INTERNAL MEDICINE | Facility: CLINIC | Age: 62
End: 2022-10-17
Payer: COMMERCIAL

## 2022-10-17 NOTE — TELEPHONE ENCOUNTER
CT scan showed enlarged prostate but no other concerning abnormalities.  Specifically no demonstration of an abdominal hernia.  If symptoms persisting, can see general surgery

## 2022-10-20 ENCOUNTER — OFFICE VISIT (OUTPATIENT)
Dept: PULMONOLOGY | Facility: CLINIC | Age: 62
End: 2022-10-20
Payer: COMMERCIAL

## 2022-10-20 VITALS
HEART RATE: 93 BPM | SYSTOLIC BLOOD PRESSURE: 144 MMHG | HEIGHT: 70 IN | DIASTOLIC BLOOD PRESSURE: 90 MMHG | BODY MASS INDEX: 30.49 KG/M2 | WEIGHT: 212.94 LBS | OXYGEN SATURATION: 95 %

## 2022-10-20 DIAGNOSIS — R05.3 CHRONIC COUGH: ICD-10-CM

## 2022-10-20 DIAGNOSIS — R91.1 LUNG NODULE: ICD-10-CM

## 2022-10-20 DIAGNOSIS — D3A.090 CARCINOID TUMOR OF RIGHT LUNG: ICD-10-CM

## 2022-10-20 DIAGNOSIS — R06.2 WHEEZING: Primary | ICD-10-CM

## 2022-10-20 PROCEDURE — 99999 PR PBB SHADOW E&M-EST. PATIENT-LVL IV: ICD-10-PCS | Mod: PBBFAC,,, | Performed by: EMERGENCY MEDICINE

## 2022-10-20 PROCEDURE — 3077F SYST BP >= 140 MM HG: CPT | Mod: CPTII,S$GLB,, | Performed by: EMERGENCY MEDICINE

## 2022-10-20 PROCEDURE — 3044F PR MOST RECENT HEMOGLOBIN A1C LEVEL <7.0%: ICD-10-PCS | Mod: CPTII,S$GLB,, | Performed by: EMERGENCY MEDICINE

## 2022-10-20 PROCEDURE — 99999 PR PBB SHADOW E&M-EST. PATIENT-LVL IV: CPT | Mod: PBBFAC,,, | Performed by: EMERGENCY MEDICINE

## 2022-10-20 PROCEDURE — 3080F DIAST BP >= 90 MM HG: CPT | Mod: CPTII,S$GLB,, | Performed by: EMERGENCY MEDICINE

## 2022-10-20 PROCEDURE — 1159F PR MEDICATION LIST DOCUMENTED IN MEDICAL RECORD: ICD-10-PCS | Mod: CPTII,S$GLB,, | Performed by: EMERGENCY MEDICINE

## 2022-10-20 PROCEDURE — 1159F MED LIST DOCD IN RCRD: CPT | Mod: CPTII,S$GLB,, | Performed by: EMERGENCY MEDICINE

## 2022-10-20 PROCEDURE — 99213 PR OFFICE/OUTPT VISIT, EST, LEVL III, 20-29 MIN: ICD-10-PCS | Mod: S$GLB,,, | Performed by: EMERGENCY MEDICINE

## 2022-10-20 PROCEDURE — 99213 OFFICE O/P EST LOW 20 MIN: CPT | Mod: S$GLB,,, | Performed by: EMERGENCY MEDICINE

## 2022-10-20 PROCEDURE — 3044F HG A1C LEVEL LT 7.0%: CPT | Mod: CPTII,S$GLB,, | Performed by: EMERGENCY MEDICINE

## 2022-10-20 PROCEDURE — 3080F PR MOST RECENT DIASTOLIC BLOOD PRESSURE >= 90 MM HG: ICD-10-PCS | Mod: CPTII,S$GLB,, | Performed by: EMERGENCY MEDICINE

## 2022-10-20 PROCEDURE — 3077F PR MOST RECENT SYSTOLIC BLOOD PRESSURE >= 140 MM HG: ICD-10-PCS | Mod: CPTII,S$GLB,, | Performed by: EMERGENCY MEDICINE

## 2022-10-20 PROCEDURE — 1160F RVW MEDS BY RX/DR IN RCRD: CPT | Mod: CPTII,S$GLB,, | Performed by: EMERGENCY MEDICINE

## 2022-10-20 PROCEDURE — 1160F PR REVIEW ALL MEDS BY PRESCRIBER/CLIN PHARMACIST DOCUMENTED: ICD-10-PCS | Mod: CPTII,S$GLB,, | Performed by: EMERGENCY MEDICINE

## 2022-10-20 RX ORDER — NAPROXEN SODIUM 220 MG/1
TABLET ORAL
COMMUNITY

## 2022-10-20 RX ORDER — ALBUTEROL SULFATE 90 UG/1
2 AEROSOL, METERED RESPIRATORY (INHALATION) EVERY 6 HOURS PRN
Qty: 18 G | Refills: 6 | Status: SHIPPED | OUTPATIENT
Start: 2022-10-20 | End: 2023-10-20

## 2022-10-20 NOTE — PROGRESS NOTES
Pulmonary & Critical Care Medicine   Consultation Note     Follow up      HPI: Referral from Dr. Joe Ibarra for robotic bronchoscopy- This is a 61 y.o. male who is being seen in pulmonary clinic for evaluation of pulmonary nodule on CT chest.  This is  a new issue.  He presents with his wife, Nehal Shaikh. He had incidental COVID-19 December 2020.     A lung nodule was found on CT calcium scoring to eval for atherosclerotic disease with dedicated CT chest with contrast on 3/3/22 revealing an infrahilar nodule 2.5 cm with distal airway opacification.     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     His medical history includes hypertension and joint pains.  He denies dyspnea at rest or with exertion. No cough, no chest pain or tightness.   Has seasonal allergies and cough related to it. Cough is seasonal and not always persistent. Denies any weight loss. He reports frequently sweating easily compared to those around him and has tendency to have a flushed face and neck.      Never  had PFTs in the past.        Prior Visit: No significant cough/sputum production, + post nasal drip. No constitutional features. No exercise intolerance. No additional complaints. Wife does endorse occasional wheezing.   PFTs look great.         Additional Pulmonary History:   Occupational/Environmental Exposures:ran [employer]. Mostly in office.. Worked with  as summer jobs. + asbestos.. 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     Exposure to Animals/Pets: None. Had dogs in past   From [facility]. Lives in [place]..   Travel History: 2019-- [place]. 2018- [place]   History of exposures to TB: none   Family History of Lung Cancer: none   Childhood history of Lung Disease:asthma as child.   Chest surgery/Trauma- None   Aspiration events- none   IS- none   Anti-Plt/OAC-baby ASA + daily motrin.    Tobacco use- Cigars occ.   Recurrent PNA- None   Anesthesia  complications- None   Sleep- Never formally diagnoses. Snores.        Prior Visit:       -- Underwent robotic bronchoscopy 5/2022 with me:   -- Pathology- typical carcinoid.   -- 6/27/22: right robotic assisted lower lobectomy with MLND     Pain never an issue and healing well healed from surgery. Main complaint is ongoing cough post operatively. Cough was constant, worse with deep inspiration and cool air. Occasionally productive of white sputum. Feels tickle in back of throat/PND- Seen by several providers including ENT/Allergy- Completed courses of abx + corticosteroids. Most recently seen by PCP- now on advair (had childhood asthma) + PPi. No nasal corticosteroids. Prior attempt at H1 antagonist, but now off. Contracted COVID about 1 week prior. Very minimal symptoms.   Over the last several days he has improved symptoms.  Still with occasional cough, but much improved from where it has been. No F/C/NS or systemic features. Post op CT pending.      INTERVAL HISTORY:   Doing well. Cough much improved. Still intermittent, but not really interfering with life. Still with concern about bulging near surgical incision site.. Had CT A/P but no abnormality identified. More active and increasing exercise.   Remains on laba/ICS and prn albuterol.. Does not require daily.   Did have COVID near onset of cough initially.   No additional complaints.   Repeat CT chest and follow up with thoracic pending.              No past medical history on file.            Past Surgical History:   Procedure Laterality Date    HAND SURGERY Right      KNEE SURGERY        ROTATOR CUFF REPAIR Right        Family/Social History: Reviewed and updated.         Drug Allergies:           Review of patient's allergies indicates:   Allergen Reactions    Pseudoephedrine hcl        Review of Systems:   A comprehensive 12-point review of systems was performed, and is negative except for those items mentioned above in the HPI section of this note.     "  BP (!) 144/90 (BP Location: Left arm, Patient Position: Sitting)   Pulse 93   Ht 5' 10" (1.778 m)   Wt 96.6 kg (212 lb 15.4 oz)   SpO2 95%   BMI 30.56 kg/m²       Physical Exam:   GEN- NAD AAOx3 Well Built, Well Appearing   HEENT- ATNC, PERRLA, EOMI, OP-Cl. No JVD, LAD or bruit noted. Trachea Midline.   CV- RRR No M/R/G  RESP- CTA-Bilateral- Chest wall incision sites C/D/I   GI- S/NT/ND. Positive BS X 4. No HSM Noted  BACK- Spine midline. No step off, crepitus or deformity noted. No midline TTP.   Ext- MAEW, No deformity. No edema or rashes noted.   Neuro- Strength 5/5 symmetric. CN 2-12 intact. Normal gait. Normal sensation.       Personal Review and Summary of Prior Diagnostics     Laboratory Studies: Reviewed        Latest Reference Range & Units 06/04/21 08:28   WBC 4.5 - 11.0 K/UL 6.4   RBC 4.45 - 5.90 MIL/uL 4.99   Hemoglobin 13.6 - 17.5 gram/dL 15.6   Hematocrit 40.0 - 52.0 % 44.6   MCV 80.0 - 94.0 Femtoliter 89.4   MCH 27.0 - 33.0 Picogram 31.2   MCHC 32.0 - 36.0 gram/dL 34.9   RDW 12.0 - 15.3 gram/dL 13.1   Platelets 150 - 350 K/   MPV 7.0 - 10.2 Femtoliter 7.5   Neutrophils Relative 32.0 - 80.0 % 63.2   Lymphocytes % 15.0 - 45.0 % 25.6   Monocytes 3.0 - 13.0 % 7.9   Eosinophil % 0.0 - 4.0 % 2.7   Basophil % 0.0 - 2.0 % 0.6   Neutrophils, Abs 2.1 - 7.6 K/UL 4.1   Lymphocytes Absolute 1.0 - 4.2 K/UL 1.6   Eosinophils Absolute 0.0 - 0.7 K/UL 0.2   Basophils Absolute 0.0 - 0.2 K/UL 0.0        Latest Reference Range & Units 12/06/21 09:56 12/06/21 09:58 12/10/21 10:09   Sodium 135 - 145 mEq/L 138       Potassium 3.5 - 5.0 mEq/L 4.4       Chloride 98 - 107 mEq/L 99       CO2 21 - 31 mEq/L 25       BUN 7 - 21 mg/dL 16       Creatinine 0.7 - 1.2 mg/dL 0.8       BUN/CREAT RATIO 6 - 22 Ratio 20       GFR >=60.0 mL/min/1.73m2 94.9 [1]       Glucose 70 - 100 mg/dL 111 (H)       Calcium 8.5 - 10.3 mg/dL 9.6       Alkaline Phosphatase 38 - 126 unit/L 84       Total Protein 6.3 - 8.2 gram/dL 7.0     "   Albumin 3.5 - 5.0 gram/dL 4.9       BILIRUBIN TOTAL 0.0 - 1.2 mg/dL 2.2 (H) [2]       AST 7 - 40 unit/L 24       ALT 7 - 56 unit/L 24       Triglycerides 30 - 150 mg/dL 87       Calc Osmolality 275 - 295 mOsm/kg 278       Cholesterol 100 - 200 mg/dL 154       HDL 40 - 75 mg/dL 70       CHOL/HDLC Ratio   2       LDL Calculated 0 - 130 mg/dL 70       Non-HDL Cholesterol 60 - 125 mg/dL 84       Hemoglobin A1C External 4.5 - 5.7 %     5.5   Total PSA < OR = 4.0 ng/mL   3.8     Free PSA ng/mL   0.5     % Free PSA >25 % (calc)   13 (L) [3]        Latest Reference Range & Units 07/19/22 10:58   A. fumigatus Class  CLASS 0   Altern. alternata Class  CLASS 0   Alternaria alternata <0.10 kU/L <0.10   Aspergillus Fumigatus IgE <0.10 kU/L <0.10   Bahia Class  CLASS 1   BAHIA GRASS <0.10 kU/L 0.43 (H)   Bald [place] Class  CLASS 0   BERMUDA GRASS <0.10 kU/L <0.10   B[place] Grass Class  CLASS 0   Cat Dander <0.10 kU/L 0.37 (H)   Cat Epithelium Class  CLASS 1   Chaetomium <0.10 kU/L <0.10   Chaetomium Glob. Class  CLASS 0   Cladosporium Class  CLASS 0   Cladosporium, IgE <0.10 kU/L <0.10   Cockroach, IgE <0.10 kU/L  -  0.46 (H)  CLASS 1   Rawlins Class  CLASS 0   Rawlins IgE <0.10 kU/L <0.10   Curvularia lunata <0.10 kU/L <0.10   Curvularia Lunata Class  CLASS 0   [place] <0.10 kU/L <0.10   D. farinae <0.10 kU/L 0.73 (H)   D. farinae Class  CLASS 2   D. pteronyssinus Class  CLASS 1   Dog Dander, IgE <0.10 kU/L <0.10   Dog Dander Class  CLASS 0   English Plantain Class  CLASS 0   Helminthosporium Class  CLASS 0   Helminthosporium Halodes IgE <0.10 kU/L <0.10   Horse <0.10 kU/L <0.10   Horse Dander Class  CLASS 0   CECILIO GRASS <0.10 kU/L 0.16 (H)   Cecilio Grass Class  CLASS 0/1   Maple/Olympia Class  CLASS 0   Maple/Olympia IgE <0.10 kU/L <0.10   Marshelder Class  CLASS 0   Marshelder IgE <0.10 kU/L <0.10   Mite Dust Pteronyssinus IgE <0.10 kU/L 0.57 (H)   MUGWORT <0.10 kU/L <0.10   Mugwort Class  CLASS 0   Oak, Class   CLASS 2   [place] Tree <0.10 kU/L <0.10   Pecan, Class  CLASS 0   Penicillium Class  CLASS 0   Penicillium, IgE <0.10 kU/L <0.10   Plantain <0.10 kU/L <0.10   Ragweed, Short, Class  CLASS 0   Ragweed, Short, IgE <0.10 kU/L <0.10   Ragweed, Western IgE <0.10 kU/L <0.10   Ragweed, Western, Class  CLASS 0   Russian Thistle Class  CLASS 0   Russian Thistle IgE <0.10 kU/L <0.10   Silver Birch Class  CLASS 2   Silver Birch IgE <0.10 kU/L 0.73 (H)   Justus Grass <0.10 kU/L 0.82 (H)   Justus Grass Class  CLASS 2   [place] Varina Tree Class  CLASS 0   [place] Varina Tree, IgE <0.10 kU/L <0.10   White Oak(Quercus alba) IgE <0.10 kU/L 1.05 (H)   White Pine Class  CLASS 0   White Pine, IgE <0.10 kU/L <0.10   Revere Class  CLASS 0   Revere, IgE <0.10 kU/L <0.10   IgE 0 - 100 IU/mL <35          Microbiology Data: None      Summary of Chest Imaging Personally Reviewed:      CT Chest 3/2022- 1. Heterogeneously enhancing infrahilar noncalcified nodule measuring up to 2.5 cm in size.  Findings are suspicious for neoplasm including carcinoid with some postobstructive changes seen within the right lower lobe inferior to this region.      NM PET 3/31- Non FDG avid infrahilar right lower lobe pulmonary nodule with associated bronchial obstruction.  Lack of significant FDG avidity and endobronchial association suggest potential carcinoid tumor     CXR 6/2022- Heart size is normal.  The osseous structures show scoliosis and degenerative change.  The lung fields are clear.  There is mild blunting of the right costophrenic angle which may reflect a small amount of pleural fluid or pleural thickening.     2D Echo: None      PFT's:      Pre lobe   FEV1/FVC- 70   FEV1- 2.91L (87%)   FVC- 4.14L (95%)   TLC- 93   RV- 96   DLCO- 98      Pre Lobe 6MWT-   5/19/2022---------Distance: 450.19 meters (1477 feet)       O2 Sat % Supplemental Oxygen Heart Rate Blood Pressure Shaheen Scale   Pre-exercise  (Resting) 96 % Room Air 79 bpm 141/85 mmHg 0   During Exercise  95 % Room Air 95 bpm 156/88 mmHg 1   Post-exercise  (Recovery) 96 % Room Air  88 bpm   mmHg        Recovery Time: 89 seconds         Assessment:       No diagnosis found.    Outpatient Encounter Medications as of 10/20/2022   Medication Sig Dispense Refill    albuterol (PROVENTIL) 2.5 mg /3 mL (0.083 %) nebulizer solution Take 3 mLs (2.5 mg total) by nebulization 3 (three) times daily as needed for Wheezing or Shortness of Breath. 180 each 2    aspirin (ECOTRIN) 81 MG EC tablet Take 81 mg by mouth once daily.      fluticasone-salmeterol diskus inhaler 250-50 mcg Inhale 1 puff into the lungs 2 (two) times daily. Controller 60 each 4    folic acid/multivit-min/lutein (CENTRUM SILVER ORAL) Take by mouth.      irbesartan-hydrochlorothiazide (AVALIDE) 300-12.5 mg per tablet Take 1 tablet by mouth once daily. 90 tablet 3    LORazepam (ATIVAN) 1 MG tablet Take 1 tablet (1 mg total) by mouth daily as needed for Anxiety. 30 tablet 2    minoxidiL (LONITEN) 2.5 MG tablet Take 1.25-2.5 mg by mouth nightly.      rosuvastatin (CRESTOR) 40 MG Tab Take 1 tablet (40 mg total) by mouth every evening. 90 tablet 3    tadalafiL (CIALIS) 5 MG tablet Take 5 mg by mouth daily as needed for Erectile Dysfunction.      VIOS AEROSOL DELIVERY SYSTEM Georgette Take by nebulization as needed.      [DISCONTINUED] hydroCHLOROthiazide (MICROZIDE) 12.5 mg capsule hydrochlorothiazide Take No date recorded No form recorded No frequency recorded No route recorded No set duration recorded No set duration amount recorded active No dosage strength recorded No dosage strength units of measure recorded       No facility-administered encounter medications on file as of 10/20/2022.     No orders of the defined types were placed in this encounter.      Plan:       1. Typical carcinoid- s/p robotic assisted RLL lobectomy  with MLND 6/27- Uneventful post operative course- Doing great   2. Cough- Post lobectomy vs. Viral mediated. Much improved.     3. History of  childhood asthma   4. Allergic rhinitis   5. GERD         Discussed with him in detail- cough post lobectomy common and likely multifactorial in nature related to shifting of anatomical structures + inflammation. Contributing factors include reflux (appears controlled on PPI) + un optimized allergic rhinitis. Did have a viral illness + COVID near time of onset so may all be post viral related.  He is clinically improving on current therapy. SpO2 >90% on RA and clear lungs on auscultation. No pain and well healed surgical incision sites. I anticipate this will improve with time as he moves further from surgery.. In some cases cough may remain persistent. All we can do at this point is optimize contributing features      -- Continue nasal corticosteroids + daily H1 antagonist.   -- Allergy avoidance   -- Maintain PPI   -- OK to trial off LABA/ICS- resume if cough worsens. Maintain prn albuterol.       Doing great overall.. No need for routine follow up. He has my contact information and can call if any change. RTC PRN.      Cahva Mcdonald M.D.   [facility]

## 2022-10-31 NOTE — TELEPHONE ENCOUNTER
Spoke with pt. Explained labs orders were sent to [facility]. Pt verbalized understanding.   no

## 2022-11-01 ENCOUNTER — PATIENT MESSAGE (OUTPATIENT)
Dept: CARDIOTHORACIC SURGERY | Facility: CLINIC | Age: 62
End: 2022-11-01
Payer: COMMERCIAL

## 2022-11-09 ENCOUNTER — OFFICE VISIT (OUTPATIENT)
Dept: CARDIOTHORACIC SURGERY | Facility: CLINIC | Age: 62
End: 2022-11-09
Payer: COMMERCIAL

## 2022-11-09 VITALS
DIASTOLIC BLOOD PRESSURE: 91 MMHG | SYSTOLIC BLOOD PRESSURE: 146 MMHG | WEIGHT: 214.06 LBS | OXYGEN SATURATION: 95 % | HEIGHT: 70 IN | BODY MASS INDEX: 30.65 KG/M2 | HEART RATE: 107 BPM

## 2022-11-09 DIAGNOSIS — C7A.090 CARCINOID BRONCHIAL ADENOMA OF RIGHT LUNG: ICD-10-CM

## 2022-11-09 DIAGNOSIS — Z85.118 HISTORY OF LUNG CANCER: Primary | ICD-10-CM

## 2022-11-09 PROCEDURE — 3008F PR BODY MASS INDEX (BMI) DOCUMENTED: ICD-10-PCS | Mod: CPTII,S$GLB,, | Performed by: THORACIC SURGERY (CARDIOTHORACIC VASCULAR SURGERY)

## 2022-11-09 PROCEDURE — 3080F PR MOST RECENT DIASTOLIC BLOOD PRESSURE >= 90 MM HG: ICD-10-PCS | Mod: CPTII,S$GLB,, | Performed by: THORACIC SURGERY (CARDIOTHORACIC VASCULAR SURGERY)

## 2022-11-09 PROCEDURE — 3044F PR MOST RECENT HEMOGLOBIN A1C LEVEL <7.0%: ICD-10-PCS | Mod: CPTII,S$GLB,, | Performed by: THORACIC SURGERY (CARDIOTHORACIC VASCULAR SURGERY)

## 2022-11-09 PROCEDURE — 99999 PR PBB SHADOW E&M-EST. PATIENT-LVL III: ICD-10-PCS | Mod: PBBFAC,,, | Performed by: THORACIC SURGERY (CARDIOTHORACIC VASCULAR SURGERY)

## 2022-11-09 PROCEDURE — 99213 PR OFFICE/OUTPT VISIT, EST, LEVL III, 20-29 MIN: ICD-10-PCS | Mod: S$GLB,,, | Performed by: THORACIC SURGERY (CARDIOTHORACIC VASCULAR SURGERY)

## 2022-11-09 PROCEDURE — 3008F BODY MASS INDEX DOCD: CPT | Mod: CPTII,S$GLB,, | Performed by: THORACIC SURGERY (CARDIOTHORACIC VASCULAR SURGERY)

## 2022-11-09 PROCEDURE — 1159F PR MEDICATION LIST DOCUMENTED IN MEDICAL RECORD: ICD-10-PCS | Mod: CPTII,S$GLB,, | Performed by: THORACIC SURGERY (CARDIOTHORACIC VASCULAR SURGERY)

## 2022-11-09 PROCEDURE — 3044F HG A1C LEVEL LT 7.0%: CPT | Mod: CPTII,S$GLB,, | Performed by: THORACIC SURGERY (CARDIOTHORACIC VASCULAR SURGERY)

## 2022-11-09 PROCEDURE — 3077F PR MOST RECENT SYSTOLIC BLOOD PRESSURE >= 140 MM HG: ICD-10-PCS | Mod: CPTII,S$GLB,, | Performed by: THORACIC SURGERY (CARDIOTHORACIC VASCULAR SURGERY)

## 2022-11-09 PROCEDURE — 99999 PR PBB SHADOW E&M-EST. PATIENT-LVL III: CPT | Mod: PBBFAC,,, | Performed by: THORACIC SURGERY (CARDIOTHORACIC VASCULAR SURGERY)

## 2022-11-09 PROCEDURE — 99213 OFFICE O/P EST LOW 20 MIN: CPT | Mod: S$GLB,,, | Performed by: THORACIC SURGERY (CARDIOTHORACIC VASCULAR SURGERY)

## 2022-11-09 PROCEDURE — 3077F SYST BP >= 140 MM HG: CPT | Mod: CPTII,S$GLB,, | Performed by: THORACIC SURGERY (CARDIOTHORACIC VASCULAR SURGERY)

## 2022-11-09 PROCEDURE — 3080F DIAST BP >= 90 MM HG: CPT | Mod: CPTII,S$GLB,, | Performed by: THORACIC SURGERY (CARDIOTHORACIC VASCULAR SURGERY)

## 2022-11-09 PROCEDURE — 1159F MED LIST DOCD IN RCRD: CPT | Mod: CPTII,S$GLB,, | Performed by: THORACIC SURGERY (CARDIOTHORACIC VASCULAR SURGERY)

## 2022-11-09 NOTE — PROGRESS NOTES
"Subjective:       Patient ID: Justus Shaikh is a 62 y.o. male.    Chief Complaint: Follow-up    Diagnosis:  Typical carcinoid     Pre-operative therapy: none     Procedure(s) and date(s): 6/28/22: Right robotic assisted lower lobectomy with MLND    Pathology: 2.6 cm typical carcinoid. Levels 2,4,7,11,12 = negative. pT1cN0     Post-operative therapy: surveillance     Follow-up  Associated symptoms include coughing. Pertinent negatives include no chest pain or fatigue.    62 year old male with HTN, HLD, Gilbert's Disease, BPH and newly diagnosed typical carcinoid of right lower lobe. Uncomplicated post-operative course. Since discharge, overall he has been doing very well. Off narcotics. Main complaint is related to persistent cough. Saw ENT. Completed course of antibiotics and steroid injection without relief. Active walking in the mall - previously 30 minutes now has done an hour.     Review of Systems   Constitutional:  Negative for activity change and fatigue.   Respiratory:  Positive for cough. Negative for shortness of breath.    Cardiovascular:  Negative for chest pain.   Gastrointestinal: Negative.    Genitourinary: Negative.    Musculoskeletal: Negative.    Neurological: Negative.    Psychiatric/Behavioral: Negative.         Objective:       Vitals:    11/09/22 0917   BP: (!) 146/91   Pulse: 107   SpO2: 95%   Weight: 97.1 kg (214 lb 1.1 oz)   Height: 5' 10" (1.778 m)   PainSc: 0-No pain       Physical Exam  Vitals reviewed.   Constitutional:       Appearance: Normal appearance.   HENT:      Head: Atraumatic.   Eyes:      Extraocular Movements: Extraocular movements intact.   Cardiovascular:      Rate and Rhythm: Normal rate and regular rhythm.      Pulses: Normal pulses.      Heart sounds: Normal heart sounds.   Pulmonary:      Effort: Pulmonary effort is normal.      Breath sounds: Normal breath sounds.      Comments: Right robotic incisions well healed.   Abdominal:      General: Abdomen is flat.      " Palpations: Abdomen is soft.   Musculoskeletal:         General: Normal range of motion.      Cervical back: Normal range of motion and neck supple.   Skin:     General: Skin is warm and dry.   Neurological:      General: No focal deficit present.      Mental Status: He is alert and oriented to person, place, and time.   Psychiatric:         Mood and Affect: Mood normal.         Behavior: Behavior normal.       CXR 7/15/22: expected post op film. Small volume loss. No significant pleural fluid     Assessment:       62 year old male with HTN, HLD, Gilbert's Disease, BPH and newly diagnosed typical carcinoid of right lower lobe for pT1c RLL typical carcinoid. Node negative.   Persistent cough.     Plan:       Overall doing well. Feels PND. Start zyrtec x 2 weeks. If no improvement trial 2 weeks of Nexium. Message with questions.   RTC in 6 months with chest CT.

## 2022-11-09 NOTE — PROGRESS NOTES
"Subjective:       Patient ID: Justus Shaikh is a 62 y.o. male.    Chief Complaint: Follow-up    Diagnosis:  Typical carcinoid     Pre-operative therapy: none     Procedure(s) and date(s): 6/28/22: Right robotic assisted lower lobectomy with MLND    Pathology: 2.6 cm typical carcinoid. Levels 2,4,7,11,12 = negative. pT1cN0     Post-operative therapy: surveillance     Follow-up  Associated symptoms include coughing. Pertinent negatives include no chest pain or fatigue.    62 year old male with HTN, HLD, Gilbert's Disease, BPH and newly diagnosed typical carcinoid of right lower lobe. Uncomplicated post-operative course. Since discharge, overall he has been doing very well. Off narcotics. Main complaint is related to persistent cough. Saw ENT. Completed course of antibiotics and steroid injection without relief. Active walking in the mall - previously 30 minutes now has done an hour.     Review of Systems   Constitutional:  Negative for activity change and fatigue.   Respiratory:  Positive for cough. Negative for shortness of breath.    Cardiovascular:  Negative for chest pain.   Gastrointestinal: Negative.    Genitourinary: Negative.    Musculoskeletal: Negative.    Neurological: Negative.    Psychiatric/Behavioral: Negative.         Objective:       Vitals:    11/09/22 0917   BP: (!) 146/91   Pulse: 107   SpO2: 95%   Weight: 97.1 kg (214 lb 1.1 oz)   Height: 5' 10" (1.778 m)   PainSc: 0-No pain       Physical Exam  Vitals reviewed.   Constitutional:       Appearance: Normal appearance.   HENT:      Head: Atraumatic.   Eyes:      Extraocular Movements: Extraocular movements intact.   Cardiovascular:      Rate and Rhythm: Normal rate and regular rhythm.      Pulses: Normal pulses.      Heart sounds: Normal heart sounds.   Pulmonary:      Effort: Pulmonary effort is normal.      Breath sounds: Normal breath sounds.      Comments: Right robotic incisions well healed.   Abdominal:      General: Abdomen is flat.      " Palpations: Abdomen is soft.   Musculoskeletal:         General: Normal range of motion.      Cervical back: Normal range of motion and neck supple.      Comments: Mild prominence along right subcostal margin region and slight asymmetry versus left without palpable mass   Skin:     General: Skin is warm and dry.      Capillary Refill: Capillary refill takes less than 2 seconds.   Neurological:      General: No focal deficit present.      Mental Status: He is alert and oriented to person, place, and time.   Psychiatric:         Mood and Affect: Mood normal.         Behavior: Behavior normal.       CXR 7/15/22: expected post op film. Small volume loss. No significant pleural fluid       Assessment:       62 year old male with HTN, HLD, Gilbert's Disease, BPH and newly diagnosed typical carcinoid of right lower lobe for pT1c RLL typical carcinoid. Node negative.   Persistent cough has improved  Probable right subcostal margin diastasis without evidence of hernia or mass effect    Plan:       RTC in 6 months with chest CT.

## 2022-12-02 ENCOUNTER — PATIENT MESSAGE (OUTPATIENT)
Dept: INTERNAL MEDICINE | Facility: CLINIC | Age: 62
End: 2022-12-02
Payer: COMMERCIAL

## 2022-12-02 DIAGNOSIS — I10 ESSENTIAL HYPERTENSION: ICD-10-CM

## 2022-12-02 DIAGNOSIS — E78.2 HYPERLIPIDEMIA, MIXED: ICD-10-CM

## 2022-12-02 DIAGNOSIS — R97.20 ELEVATED PSA: Primary | ICD-10-CM

## 2022-12-02 DIAGNOSIS — R73.01 IMPAIRED FASTING GLUCOSE: ICD-10-CM

## 2022-12-02 RX ORDER — IRBESARTAN AND HYDROCHLOROTHIAZIDE 300; 12.5 MG/1; MG/1
1 TABLET, FILM COATED ORAL DAILY
Qty: 90 TABLET | Refills: 3 | Status: SHIPPED | OUTPATIENT
Start: 2022-12-02 | End: 2023-03-27 | Stop reason: SDUPTHER

## 2022-12-02 NOTE — TELEPHONE ENCOUNTER
No new care gaps identified.  [facility] Embedded Care Gaps. Reference number: [id number]. 12/02/2022   11:05:40 AM CST

## 2022-12-07 ENCOUNTER — TELEPHONE (OUTPATIENT)
Dept: INTERNAL MEDICINE | Facility: CLINIC | Age: 62
End: 2022-12-07
Payer: COMMERCIAL

## 2022-12-07 ENCOUNTER — PATIENT MESSAGE (OUTPATIENT)
Dept: INTERNAL MEDICINE | Facility: CLINIC | Age: 62
End: 2022-12-07
Payer: COMMERCIAL

## 2022-12-07 DIAGNOSIS — M25.512 ACUTE PAIN OF BOTH SHOULDERS: Primary | ICD-10-CM

## 2022-12-07 DIAGNOSIS — M25.511 ACUTE PAIN OF BOTH SHOULDERS: Primary | ICD-10-CM

## 2022-12-07 DIAGNOSIS — M25.562 ACUTE PAIN OF LEFT KNEE: ICD-10-CM

## 2022-12-07 NOTE — TELEPHONE ENCOUNTER
----- Message from Noel Jacob sent at 12/7/2022  2:33 PM CST -----  Contact: [phone]  He asked for a callback once his lab orders are faxed to [facility] and he will call them now.     Thank you

## 2022-12-07 NOTE — TELEPHONE ENCOUNTER
Pt is requesting lab orders for upcoming appt tomorrow to be sent to [facility] including PSA and testosterone.

## 2022-12-08 ENCOUNTER — OFFICE VISIT (OUTPATIENT)
Dept: INTERNAL MEDICINE | Facility: CLINIC | Age: 62
End: 2022-12-08
Payer: COMMERCIAL

## 2022-12-08 ENCOUNTER — PATIENT MESSAGE (OUTPATIENT)
Dept: INTERNAL MEDICINE | Facility: CLINIC | Age: 62
End: 2022-12-08

## 2022-12-08 VITALS
SYSTOLIC BLOOD PRESSURE: 130 MMHG | BODY MASS INDEX: 30.91 KG/M2 | OXYGEN SATURATION: 97 % | DIASTOLIC BLOOD PRESSURE: 84 MMHG | HEART RATE: 84 BPM | HEIGHT: 70 IN | WEIGHT: 215.94 LBS

## 2022-12-08 DIAGNOSIS — N40.0 BENIGN PROSTATIC HYPERPLASIA, UNSPECIFIED WHETHER LOWER URINARY TRACT SYMPTOMS PRESENT: ICD-10-CM

## 2022-12-08 DIAGNOSIS — R73.01 IMPAIRED FASTING GLUCOSE: ICD-10-CM

## 2022-12-08 DIAGNOSIS — E78.2 HYPERLIPIDEMIA, MIXED: ICD-10-CM

## 2022-12-08 DIAGNOSIS — I10 ESSENTIAL HYPERTENSION: ICD-10-CM

## 2022-12-08 DIAGNOSIS — F41.9 ANXIETY: ICD-10-CM

## 2022-12-08 DIAGNOSIS — R97.20 ELEVATED PSA: ICD-10-CM

## 2022-12-08 DIAGNOSIS — D3A.090 CARCINOID TUMOR OF RIGHT LUNG: Primary | ICD-10-CM

## 2022-12-08 LAB
ALBUMIN: 4.6 G/DL (ref 3.5–5)
ALP SERPL-CCNC: 84 U/L (ref 38–126)
ALT SERPL W P-5'-P-CCNC: 19 U/L (ref 7–56)
ANION GAP SERPL CALC-SCNC: 18.5 MMOL/L (ref 9–18)
AST SERPL-CCNC: 18 U/L (ref 7–40)
BILIRUB SERPL-MCNC: 1 MG/DL (ref 0–1.2)
BUN BLD-MCNC: 17 MG/DL (ref 7–21)
BUN/CREAT SERPL: 21 (ref 6–22)
CALC OSMOLALITY: 274 MOSM/KG (ref 275–295)
CALCIUM SERPL-MCNC: 9.4 MG/DL (ref 8.5–10.3)
CHLORIDE SERPL-SCNC: 100 MMOL/L (ref 98–107)
CHOL/HDLC RATIO: 3.23
CHOLEST SERPL-MSCNC: 171 MG/DL (ref 100–200)
CO2 SERPL-SCNC: 22 MMOL/L (ref 21–31)
CREAT SERPL-MCNC: 0.8 MG/DL (ref 0.7–1.2)
EGFR: 99 ML/MIN
GLUCOSE SERPL-MCNC: 105 MG/DL (ref 70–100)
HBA1C MFR BLD: 5.4 % (ref 4.5–5.7)
HDLC SERPL-MCNC: 53 MG/DL (ref 40–75)
LDLC SERPL CALC-MCNC: 92 MG/DL (ref 0–130)
POTASSIUM SERPL-SCNC: 4.5 MMOL/L (ref 3.5–5)
SODIUM BLD-SCNC: 136 MMOL/L (ref 135–145)
TOTAL PROTEIN: 6.8 G/DL (ref 6.3–8.2)
TRIGL SERPL-MCNC: 163 MG/DL (ref 30–150)

## 2022-12-08 PROCEDURE — 3079F DIAST BP 80-89 MM HG: CPT | Mod: CPTII,S$GLB,, | Performed by: INTERNAL MEDICINE

## 2022-12-08 PROCEDURE — 3044F HG A1C LEVEL LT 7.0%: CPT | Mod: CPTII,S$GLB,, | Performed by: INTERNAL MEDICINE

## 2022-12-08 PROCEDURE — 99999 PR PBB SHADOW E&M-EST. PATIENT-LVL IV: ICD-10-PCS | Mod: PBBFAC,,, | Performed by: INTERNAL MEDICINE

## 2022-12-08 PROCEDURE — 99999 PR PBB SHADOW E&M-EST. PATIENT-LVL IV: CPT | Mod: PBBFAC,,, | Performed by: INTERNAL MEDICINE

## 2022-12-08 PROCEDURE — 1159F MED LIST DOCD IN RCRD: CPT | Mod: CPTII,S$GLB,, | Performed by: INTERNAL MEDICINE

## 2022-12-08 PROCEDURE — 3075F PR MOST RECENT SYSTOLIC BLOOD PRESS GE 130-139MM HG: ICD-10-PCS | Mod: CPTII,S$GLB,, | Performed by: INTERNAL MEDICINE

## 2022-12-08 PROCEDURE — 99214 OFFICE O/P EST MOD 30 MIN: CPT | Mod: S$GLB,,, | Performed by: INTERNAL MEDICINE

## 2022-12-08 PROCEDURE — 99214 PR OFFICE/OUTPT VISIT, EST, LEVL IV, 30-39 MIN: ICD-10-PCS | Mod: S$GLB,,, | Performed by: INTERNAL MEDICINE

## 2022-12-08 PROCEDURE — 3008F BODY MASS INDEX DOCD: CPT | Mod: CPTII,S$GLB,, | Performed by: INTERNAL MEDICINE

## 2022-12-08 PROCEDURE — 3008F PR BODY MASS INDEX (BMI) DOCUMENTED: ICD-10-PCS | Mod: CPTII,S$GLB,, | Performed by: INTERNAL MEDICINE

## 2022-12-08 PROCEDURE — 3075F SYST BP GE 130 - 139MM HG: CPT | Mod: CPTII,S$GLB,, | Performed by: INTERNAL MEDICINE

## 2022-12-08 PROCEDURE — 3079F PR MOST RECENT DIASTOLIC BLOOD PRESSURE 80-89 MM HG: ICD-10-PCS | Mod: CPTII,S$GLB,, | Performed by: INTERNAL MEDICINE

## 2022-12-08 PROCEDURE — 1159F PR MEDICATION LIST DOCUMENTED IN MEDICAL RECORD: ICD-10-PCS | Mod: CPTII,S$GLB,, | Performed by: INTERNAL MEDICINE

## 2022-12-08 PROCEDURE — 3044F PR MOST RECENT HEMOGLOBIN A1C LEVEL <7.0%: ICD-10-PCS | Mod: CPTII,S$GLB,, | Performed by: INTERNAL MEDICINE

## 2022-12-08 RX ORDER — ROSUVASTATIN CALCIUM 20 MG/1
20 TABLET, COATED ORAL EVERY OTHER DAY
COMMUNITY
Start: 2022-12-08 | End: 2023-03-27 | Stop reason: SDUPTHER

## 2022-12-08 NOTE — PROGRESS NOTES
[facility] Note    Chief Complaint      Chief Complaint   Patient presents with    Follow-up     6 month f/u       History of Present Illness      Justus Shaikh is a 62 y.o. male with chronic conditions of HTN, HLD, carcinoid of right lower lobe lung, Gilbert disease, BPH who presents today for: follow up chronic conditions.  Working with PT to regain muscle strength and lose weight.    Typical carcinoid tumor of RLL: Seeing Dr. Saeed.  S/p right lobectomy.  Cough has mostly resolved, reduced albuterol usage.  Right anterior abdominal bulge considered expected sequela of CT surgery.    HTN: BP at goal on irbesartan-hctz.  Sees Dr. Leija as needed.  HLD: Controlled on crestor.  LDL 70 last check.  On every other day to avoid joint pain.  BPH, elevated PSA: Sees Dr. Flores.  On cialis daily. Had recent MRI which did not show any cancer. Last PSA 4.7, 13%.    Gilbert disease: LFTs stable.  Anxiety: Controlled on lorazepam as needed.    Alopecia: Sees dermatology.  On minoxidil currently.  Flu shot UTD.  Td within 10 yrs.  Plan to revaccinate in 2025.  Shingrix discussed.  Pneumonia vaccine due age 63.  COVID UTD.  Cscope Dr. Solano, 4-5 yrs ago, no polyps, 10 yr interval.     Past Medical History:  History reviewed. No pertinent past medical history.    Past Surgical History:   has a past surgical history that includes Rotator cuff repair (Right); Hand surgery (Right); Knee surgery; robotic bronchoscopy (N/A, 05/06/2022); Hernia repair (1961); Tonsillectomy (1963); Vasectomy (1998); xi robotic rats,with lobectomy,lung (Right, 6/27/2022); and Injection of anesthetic agent around multiple intercostal nerves (Right, 6/27/2022).    Family History:  family history includes Arthritis in his father and sister; Asthma in his son; Cancer in his brother; Diabetes in his father; Drug abuse in his brother; Heart disease in his father and mother; Hypertension in his mother; Learning disabilities in his brother  and son.     Social History:  Social History     Tobacco Use    Smoking status: Light Smoker     Packs/day: 0.00     Years: 10.00     Pack years: 0.00     Types: Cigars, Cigarettes    Smokeless tobacco: Never   Substance Use Topics    Alcohol use: Yes     Alcohol/week: 10.0 standard drinks     Types: 10 Glasses of wine per week    Drug use: Never       I personally reviewed all past medical, surgical, social and family history.    Review of Systems   Constitutional:  Negative for chills, fever and malaise/fatigue.   Respiratory:  Negative for shortness of breath.    Cardiovascular:  Negative for chest pain.   Gastrointestinal:  Negative for constipation, diarrhea, nausea and vomiting.   Skin:  Negative for rash.   Neurological:  Negative for weakness.   All other systems reviewed and are negative.     Medications:  Outpatient Encounter Medications as of 12/8/2022   Medication Sig Note Dispense Refill    albuterol (PROVENTIL) 2.5 mg /3 mL (0.083 %) nebulizer solution Take 3 mLs (2.5 mg total) by nebulization 3 (three) times daily as needed for Wheezing or Shortness of Breath.  180 each 2    albuterol (VENTOLIN HFA) 90 mcg/actuation inhaler Inhale 2 puffs into the lungs every 6 (six) hours as needed for Wheezing. Rescue  18 g 6    aspirin (ECOTRIN) 81 MG EC tablet Take 81 mg by mouth once daily.       esomeprazole magnesium (NEXIUM 24HR ORAL) Take by mouth.       fluticasone-salmeterol diskus inhaler 250-50 mcg Inhale 1 puff into the lungs 2 (two) times daily. Controller  60 each 4    folic acid/multivit-min/lutein (CENTRUM SILVER ORAL) Take by mouth.       irbesartan-hydrochlorothiazide (AVALIDE) 300-12.5 mg per tablet Take 1 tablet by mouth once daily.  90 tablet 3    loratadine (CLARITIN ORAL) Take by mouth.       LORazepam (ATIVAN) 1 MG tablet Take 1 tablet (1 mg total) by mouth daily as needed for Anxiety.  30 tablet 2    minoxidiL (LONITEN) 2.5 MG tablet Take 5 mg by mouth nightly.       omega 3-dha-epa-fish oil  "1,200 (144-216) mg Cap Take by mouth.       rosuvastatin (CRESTOR) 20 MG tablet Take 1 tablet (20 mg total) by mouth every other day.       tadalafiL (CIALIS) 5 MG tablet Take 5 mg by mouth daily as needed for Erectile Dysfunction. 6/24/2022: Hold am of surgery       [facility] AEROSOL DELIVERY SYSTEM Georgette Take by nebulization as needed.       [DISCONTINUED] hydroCHLOROthiazide (MICROZIDE) 12.5 mg capsule hydrochlorothiazide Take No date recorded No form recorded No frequency recorded No route recorded No set duration recorded No set duration amount recorded active No dosage strength recorded No dosage strength units of measure recorded       [DISCONTINUED] rosuvastatin (CRESTOR) 40 MG Tab Take 1 tablet (40 mg total) by mouth every evening. 6/27/2022: Takes 20 mg every other day 90 tablet 3     No facility-administered encounter medications on file as of 12/8/2022.       Allergies:  Review of patient's allergies indicates:   Allergen Reactions    Pseudoephedrine hcl        Health Maintenance:  Immunization History   Administered Date(s) Administered    COVID-19, MRNA, LN-S, PF (MODERNA FULL 0.5 ML DOSE) 02/25/2021, 03/23/2021    Influenza - Quadrivalent - MDCK - PF 10/23/2019, 09/16/2020, 11/21/2022    Influenza - Quadrivalent - PF *Preferred* (6 months and older) 12/08/2021      Health Maintenance   Topic Date Due    TETANUS VACCINE  Never done    Lipid Panel  06/03/2027    Hepatitis C Screening  Completed        Physical Exam      Vital Signs  Pulse: 84  SpO2: 97 %  BP: 130/84  BP Location: Left arm  Patient Position: Sitting  Pain Score: 0-No pain  Height and Weight  Height: 5' 10" (177.8 cm)  Weight: 98 kg (215 lb 15.1 oz)  BSA (Calculated - sq m): 2.2 sq meters  BMI (Calculated): 31  Weight in (lb) to have BMI = 25: 173.9]    Physical Exam  Vitals reviewed.   Constitutional:       Appearance: He is well-developed.   HENT:      Head: Normocephalic and atraumatic.      Right Ear: External ear normal.      Left Ear: " External ear normal.   Cardiovascular:      Rate and Rhythm: Normal rate and regular rhythm.      Heart sounds: Normal heart sounds. No murmur heard.  Pulmonary:      Effort: Pulmonary effort is normal.      Breath sounds: Normal breath sounds. No wheezing or rales.   Abdominal:      General: Bowel sounds are normal.      Palpations: Abdomen is soft.        Laboratory:  CBC:  Recent Labs   Lab 12/04/20  0744 06/04/21  0828 07/19/22  1058   WBC 5.2 6.4 6.45   RBC 5.02 4.99 5.04   Hemoglobin 15.6 15.6 15.3   Hematocrit 45.8 44.6 44.6   Platelets 245 248 337   MCV 91.2 89.4 89   MCH 31.1 31.2 30.4   MCHC 34.2 34.9 34.3     CMP:  Recent Labs   Lab 06/04/21  0828 12/06/21  0956 06/03/22  0843   Glucose 104 H 111 H 107 H   Calcium 9.7 9.6 9.6   ALBUMIN 5.2 H 4.9 4.8   Total Protein 6.9 7.0 7.0   Sodium 138 138 141   Potassium 4.5 4.4 4.8   CO2 27 25 27   Chloride 99 99 102   BUN 15 16 11.0   Alkaline Phosphatase 98 84 94   ALT 25 24 25   AST 24 24 22   Total Bilirubin 2.1 H 2.2 H 1.1     URINALYSIS:       LIPIDS:  Recent Labs   Lab 12/04/20  0744 06/04/21  0828 12/06/21  0956 06/03/22  0843   TSH 3.58 2.44  --   --    HDL 52 62 70 52   Cholesterol 153 156 154 179   Triglycerides 128 123 87 122   LDL Calculated 81 74 70 107   Non-HDL Cholesterol 101 94 84  --      TSH:  Recent Labs   Lab 12/04/20  0744 06/04/21  0828   TSH 3.58 2.44     A1C:  Recent Labs   Lab 12/10/21  1009 06/03/22  0843   Hemoglobin A1C 5.5 5.3       Assessment/Plan     Justus Shaikh is a 62 y.o.male with:    1. Carcinoid tumor of right lung  Continue follow up with CT surgery  2. Essential hypertension  Continue current meds.    3. Hyperlipidemia, mixed  - rosuvastatin (CRESTOR) 20 MG tablet; Take 1 tablet (20 mg total) by mouth every other day.  Continue current meds.  Labs pending  4. Impaired fasting glucose  Continue diet.  5. Benign prostatic hyperplasia, unspecified whether lower urinary tract symptoms present  6. Elevated PSA  Continue current  meds.    7. Anxiety   Continue current meds.      Chronic conditions status updated as per HPI.  Other than changes above, cont current medications and maintain follow up with specialists.  Follow up in about 6 months (around 6/8/2023) for Follow up visit.    Future Appointments   Date Time Provider Department Center   5/10/2023  8:45 AM [facility]CT[facility] 500 LB LIMIT [facility]   5/10/2023  9:45 AM Osvaldo Saeed MD [facility] Sam Worthington MD  [facility]

## 2022-12-08 NOTE — LETTER
December 13, 2022                   [facility]  [address]  [facility] [address]  Phone: [phone]  Fax: [phone]   Patient: Justus Shaikh   MR Number: [id number]   YOB: 1960   Date of Visit: 12/8/2022     Dear Dr. Grimaldo,     I am referring Mr. Justus Shaikh to you for evaluation and recommendation of medical marijuana for chronic bilateral shoulder and knee pain.  He has recently had resection of a malignant carcinoid tumor in his lung.  The impact of this treatment course has caused muscular deconditioning and exacerbation of chronic shoulder and knee pains.  I am requesting he be evaluated and, if indication, be prescribed medical marijuana in an effort to avoid the use of over the counter analgesics and prescription anti-inflammatories which may interact with his other conditions.    Please contact my office for any additional information.    Sincerely,         Isidro Worthington MD            CC    No Recipients    Enclosure

## 2022-12-10 LAB
PSA FREE MFR SERPL: 13 % (CALC)
PSA FREE SERPL-MCNC: 0.5 NG/ML
PSA SERPL-MCNC: 4 NG/ML
TESTOST SERPL-MCNC: 313 NG/DL (ref 250–1100)

## 2022-12-13 ENCOUNTER — PATIENT MESSAGE (OUTPATIENT)
Dept: INTERNAL MEDICINE | Facility: CLINIC | Age: 62
End: 2022-12-13
Payer: COMMERCIAL

## 2022-12-27 ENCOUNTER — PATIENT MESSAGE (OUTPATIENT)
Dept: CARDIOTHORACIC SURGERY | Facility: CLINIC | Age: 62
End: 2022-12-27
Payer: COMMERCIAL

## 2023-03-23 ENCOUNTER — PATIENT MESSAGE (OUTPATIENT)
Dept: ADMINISTRATIVE | Facility: HOSPITAL | Age: 63
End: 2023-03-23
Payer: MEDICARE

## 2023-03-27 DIAGNOSIS — E78.2 HYPERLIPIDEMIA, MIXED: ICD-10-CM

## 2023-03-27 DIAGNOSIS — I10 ESSENTIAL HYPERTENSION: ICD-10-CM

## 2023-03-27 DIAGNOSIS — F41.9 ANXIETY: ICD-10-CM

## 2023-03-27 NOTE — TELEPHONE ENCOUNTER
----- Message from Mari Sommer sent at 3/27/2023  3:53 PM CDT -----  Contact: Self/[phone]  Pt said that he is calling in regards to his insurance is changing to [employer] 4/1/2023 he is asking if Dr Worthington can send a rx for all of his medications to :     [facility] - [place], [place] - [address]  [address]  [facility] [address]  Phone: [phone] Fax: [phone]

## 2023-03-27 NOTE — TELEPHONE ENCOUNTER
Asking for Crestor, irbesartan-hctz, and lorazepam to be sent to [facility] pharmacy. Ok to fill? pended

## 2023-03-28 RX ORDER — LORAZEPAM 1 MG/1
1 TABLET ORAL DAILY PRN
Qty: 30 TABLET | Refills: 3 | Status: SHIPPED | OUTPATIENT
Start: 2023-03-28 | End: 2023-11-29 | Stop reason: SDUPTHER

## 2023-03-28 RX ORDER — ROSUVASTATIN CALCIUM 20 MG/1
20 TABLET, COATED ORAL EVERY OTHER DAY
Qty: 90 TABLET | Refills: 3 | Status: SHIPPED | OUTPATIENT
Start: 2023-03-28 | End: 2024-03-30

## 2023-03-28 RX ORDER — IRBESARTAN AND HYDROCHLOROTHIAZIDE 300; 12.5 MG/1; MG/1
1 TABLET, FILM COATED ORAL DAILY
Qty: 90 TABLET | Refills: 3 | Status: SHIPPED | OUTPATIENT
Start: 2023-03-28 | End: 2024-01-16

## 2023-04-21 ENCOUNTER — PATIENT MESSAGE (OUTPATIENT)
Dept: ADMINISTRATIVE | Facility: HOSPITAL | Age: 63
End: 2023-04-21
Payer: MEDICARE

## 2023-04-28 ENCOUNTER — PATIENT MESSAGE (OUTPATIENT)
Dept: CARDIOTHORACIC SURGERY | Facility: CLINIC | Age: 63
End: 2023-04-28
Payer: MEDICARE

## 2023-05-15 NOTE — PROGRESS NOTES
"Subjective:       Patient ID: Justus Shaikh is a 63 y.o. male.    Chief Complaint: No chief complaint on file.      Diagnosis:  Typical carcinoid     Pre-operative therapy: none     Procedure(s) and date(s): 6/28/22: Right robotic assisted lower lobectomy with MLND    Pathology: 2.6 cm typical carcinoid. Levels 2,4,7,11,12 = negative. pT1cN0     Post-operative therapy: surveillance     62 year old male with HTN, HLD, Gilbert's Disease, and BPH who presents to clinic for 1 year follow up s/p right robotic assisted lower lobectomy with MLND for typical carcinoid of right lower lobe. Uncomplicated post-operative course. Here today with updated chest CT. Today patient reports he is doing well. Just returned from cruise with wife and states he walked several flights of stairs on the cruise without issue. Does report occasional dry cough at night.     Review of Systems   Constitutional:  Negative for activity change and fatigue.   Respiratory:  Positive for cough. Negative for shortness of breath.         NP cough when lying on right side   Cardiovascular:  Negative for chest pain.   Gastrointestinal: Negative.    Genitourinary: Negative.    Musculoskeletal: Negative.    Neurological: Negative.    Psychiatric/Behavioral: Negative.         Objective:       Vitals:    05/17/23 0908   BP: 120/80   Weight: 99.2 kg (218 lb 11.1 oz)   Height: 5' 10" (1.778 m)   PainSc: 0-No pain         Physical Exam  Vitals reviewed.   Constitutional:       Appearance: Normal appearance.   HENT:      Head: Atraumatic.   Eyes:      Extraocular Movements: Extraocular movements intact.   Cardiovascular:      Rate and Rhythm: Normal rate and regular rhythm.      Pulses: Normal pulses.      Heart sounds: Normal heart sounds.   Pulmonary:      Effort: Pulmonary effort is normal.      Breath sounds: Normal breath sounds.      Comments: Right robotic incisions well healed.   Abdominal:      General: Abdomen is flat.      Palpations: Abdomen is soft. "   Musculoskeletal:         General: Normal range of motion.      Cervical back: Normal range of motion and neck supple.   Skin:     General: Skin is warm and dry.      Capillary Refill: Capillary refill takes less than 2 seconds.   Neurological:      General: No focal deficit present.      Mental Status: He is alert and oriented to person, place, and time.   Psychiatric:         Mood and Affect: Mood normal.         Behavior: Behavior normal.       CXR 7/15/22: expected post op film. Small volume loss. No significant pleural fluid     Chest CT 05/17/23: images personally reviewed  Postoperative change of right lower lobectomy without specific evidence of recurrent or new metastatic disease in the chest.  Assessment:       63 y.o. male with HTN, HLD, Gilbert's Disease, and BPH who presents for follow up s/p right robotic assisted lower lobectomy with MLND for typical carcinoid of right lower lobe for pT1c RLL typical carcinoid. Node negative.   ARIAN    Plan:       RTC in 6 months with chest CT.

## 2023-05-17 ENCOUNTER — HOSPITAL ENCOUNTER (OUTPATIENT)
Dept: RADIOLOGY | Facility: HOSPITAL | Age: 63
Discharge: HOME | End: 2023-05-17
Attending: THORACIC SURGERY (CARDIOTHORACIC VASCULAR SURGERY)
Payer: MEDICARE

## 2023-05-17 ENCOUNTER — OFFICE VISIT (OUTPATIENT)
Dept: CARDIOTHORACIC SURGERY | Facility: CLINIC | Age: 63
End: 2023-05-17
Payer: MEDICARE

## 2023-05-17 VITALS
SYSTOLIC BLOOD PRESSURE: 167 MMHG | OXYGEN SATURATION: 96 % | HEIGHT: 70 IN | WEIGHT: 218.69 LBS | BODY MASS INDEX: 31.31 KG/M2 | DIASTOLIC BLOOD PRESSURE: 82 MMHG | HEART RATE: 84 BPM

## 2023-05-17 DIAGNOSIS — Z85.118 HISTORY OF LUNG CANCER: Primary | ICD-10-CM

## 2023-05-17 DIAGNOSIS — D3A.090 CARCINOID TUMOR OF RIGHT LUNG: ICD-10-CM

## 2023-05-17 PROCEDURE — 3077F SYST BP >= 140 MM HG: CPT | Mod: CPTII,,, | Performed by: THORACIC SURGERY (CARDIOTHORACIC VASCULAR SURGERY)

## 2023-05-17 PROCEDURE — 3077F PR MOST RECENT SYSTOLIC BLOOD PRESSURE >= 140 MM HG: ICD-10-PCS | Mod: CPTII,,, | Performed by: THORACIC SURGERY (CARDIOTHORACIC VASCULAR SURGERY)

## 2023-05-17 PROCEDURE — 1159F MED LIST DOCD IN RCRD: CPT | Mod: CPTII,,, | Performed by: THORACIC SURGERY (CARDIOTHORACIC VASCULAR SURGERY)

## 2023-05-17 PROCEDURE — 71250 CT THORAX DX C-: CPT | Mod: TC

## 2023-05-17 PROCEDURE — 99999 PR PBB SHADOW E&M-EST. PATIENT-LVL IV: CPT | Mod: PBBFAC,,, | Performed by: THORACIC SURGERY (CARDIOTHORACIC VASCULAR SURGERY)

## 2023-05-17 PROCEDURE — 99213 OFFICE O/P EST LOW 20 MIN: CPT | Mod: ,,, | Performed by: THORACIC SURGERY (CARDIOTHORACIC VASCULAR SURGERY)

## 2023-05-17 PROCEDURE — 71250 CT CHEST WITHOUT CONTRAST: ICD-10-PCS | Mod: 26,,, | Performed by: RADIOLOGY

## 2023-05-17 PROCEDURE — 3008F PR BODY MASS INDEX (BMI) DOCUMENTED: ICD-10-PCS | Mod: CPTII,,, | Performed by: THORACIC SURGERY (CARDIOTHORACIC VASCULAR SURGERY)

## 2023-05-17 PROCEDURE — 1159F PR MEDICATION LIST DOCUMENTED IN MEDICAL RECORD: ICD-10-PCS | Mod: CPTII,,, | Performed by: THORACIC SURGERY (CARDIOTHORACIC VASCULAR SURGERY)

## 2023-05-17 PROCEDURE — 3079F DIAST BP 80-89 MM HG: CPT | Mod: CPTII,,, | Performed by: THORACIC SURGERY (CARDIOTHORACIC VASCULAR SURGERY)

## 2023-05-17 PROCEDURE — 99999 PR PBB SHADOW E&M-EST. PATIENT-LVL IV: ICD-10-PCS | Mod: PBBFAC,,, | Performed by: THORACIC SURGERY (CARDIOTHORACIC VASCULAR SURGERY)

## 2023-05-17 PROCEDURE — 3008F BODY MASS INDEX DOCD: CPT | Mod: CPTII,,, | Performed by: THORACIC SURGERY (CARDIOTHORACIC VASCULAR SURGERY)

## 2023-05-17 PROCEDURE — 3079F PR MOST RECENT DIASTOLIC BLOOD PRESSURE 80-89 MM HG: ICD-10-PCS | Mod: CPTII,,, | Performed by: THORACIC SURGERY (CARDIOTHORACIC VASCULAR SURGERY)

## 2023-05-17 PROCEDURE — 99213 PR OFFICE/OUTPT VISIT, EST, LEVL III, 20-29 MIN: ICD-10-PCS | Mod: ,,, | Performed by: THORACIC SURGERY (CARDIOTHORACIC VASCULAR SURGERY)

## 2023-05-17 PROCEDURE — 71250 CT THORAX DX C-: CPT | Mod: 26,,, | Performed by: RADIOLOGY

## 2023-05-25 ENCOUNTER — TELEPHONE (OUTPATIENT)
Dept: PRIMARY CARE CLINIC | Facility: CLINIC | Age: 63
End: 2023-05-25
Payer: MEDICARE

## 2023-05-25 NOTE — TELEPHONE ENCOUNTER
Patient aware to keep appt on the 9th  with Dr Worthington .Patient states his b/p run the mid 14o's and higher when he has a lot of salt intake.I advised patient to lower salt intake ,drink fluids and keep track of b/p.

## 2023-05-25 NOTE — TELEPHONE ENCOUNTER
----- Message from Jes Morales sent at 5/25/2023  9:06 AM CDT -----  Contact: patient Justus  [phone]  Patient called requesting a call back from Dr. Worthington or the nurse, regarding his blood pressure is high around mid 140's/ 87 and can go 160/94, please call patient to discuss

## 2023-06-09 ENCOUNTER — OFFICE VISIT (OUTPATIENT)
Dept: PRIMARY CARE CLINIC | Facility: CLINIC | Age: 63
End: 2023-06-09
Payer: MEDICARE

## 2023-06-09 VITALS
BODY MASS INDEX: 30.83 KG/M2 | SYSTOLIC BLOOD PRESSURE: 138 MMHG | OXYGEN SATURATION: 97 % | DIASTOLIC BLOOD PRESSURE: 80 MMHG | HEART RATE: 92 BPM | HEIGHT: 70 IN | WEIGHT: 215.38 LBS

## 2023-06-09 DIAGNOSIS — R97.20 ELEVATED PSA: ICD-10-CM

## 2023-06-09 DIAGNOSIS — N40.0 BENIGN PROSTATIC HYPERPLASIA, UNSPECIFIED WHETHER LOWER URINARY TRACT SYMPTOMS PRESENT: ICD-10-CM

## 2023-06-09 DIAGNOSIS — E78.2 HYPERLIPIDEMIA, MIXED: ICD-10-CM

## 2023-06-09 DIAGNOSIS — I10 ESSENTIAL HYPERTENSION: ICD-10-CM

## 2023-06-09 DIAGNOSIS — R73.01 IMPAIRED FASTING GLUCOSE: ICD-10-CM

## 2023-06-09 DIAGNOSIS — I70.0 AORTIC ATHEROSCLEROSIS: ICD-10-CM

## 2023-06-09 DIAGNOSIS — D3A.090 CARCINOID TUMOR OF RIGHT LUNG: Primary | ICD-10-CM

## 2023-06-09 PROCEDURE — 3079F PR MOST RECENT DIASTOLIC BLOOD PRESSURE 80-89 MM HG: ICD-10-PCS | Mod: CPTII,S$GLB,, | Performed by: INTERNAL MEDICINE

## 2023-06-09 PROCEDURE — 3079F DIAST BP 80-89 MM HG: CPT | Mod: CPTII,S$GLB,, | Performed by: INTERNAL MEDICINE

## 2023-06-09 PROCEDURE — 3075F PR MOST RECENT SYSTOLIC BLOOD PRESS GE 130-139MM HG: ICD-10-PCS | Mod: CPTII,S$GLB,, | Performed by: INTERNAL MEDICINE

## 2023-06-09 PROCEDURE — 99999 PR PBB SHADOW E&M-EST. PATIENT-LVL IV: ICD-10-PCS | Mod: PBBFAC,,, | Performed by: INTERNAL MEDICINE

## 2023-06-09 PROCEDURE — 1159F MED LIST DOCD IN RCRD: CPT | Mod: CPTII,S$GLB,, | Performed by: INTERNAL MEDICINE

## 2023-06-09 PROCEDURE — 1159F PR MEDICATION LIST DOCUMENTED IN MEDICAL RECORD: ICD-10-PCS | Mod: CPTII,S$GLB,, | Performed by: INTERNAL MEDICINE

## 2023-06-09 PROCEDURE — 99999 PR PBB SHADOW E&M-EST. PATIENT-LVL IV: CPT | Mod: PBBFAC,,, | Performed by: INTERNAL MEDICINE

## 2023-06-09 PROCEDURE — 3008F BODY MASS INDEX DOCD: CPT | Mod: CPTII,S$GLB,, | Performed by: INTERNAL MEDICINE

## 2023-06-09 PROCEDURE — 3008F PR BODY MASS INDEX (BMI) DOCUMENTED: ICD-10-PCS | Mod: CPTII,S$GLB,, | Performed by: INTERNAL MEDICINE

## 2023-06-09 PROCEDURE — 99214 OFFICE O/P EST MOD 30 MIN: CPT | Mod: S$GLB,,, | Performed by: INTERNAL MEDICINE

## 2023-06-09 PROCEDURE — 3075F SYST BP GE 130 - 139MM HG: CPT | Mod: CPTII,S$GLB,, | Performed by: INTERNAL MEDICINE

## 2023-06-09 PROCEDURE — 99214 PR OFFICE/OUTPT VISIT, EST, LEVL IV, 30-39 MIN: ICD-10-PCS | Mod: S$GLB,,, | Performed by: INTERNAL MEDICINE

## 2023-06-09 NOTE — PROGRESS NOTES
[facility] Note    Chief Complaint      Chief Complaint   Patient presents with    Follow-up     6 month        History of Present Illness      Justus Shaikh is a 63 y.o. male with chronic conditions of HTN, HLD, carcinoid of right lower lobe lung, Gilbert disease, BPH who presents today for: follow up chronic conditions.  Has been seeing ortho for bilateral knee pain and prescribed meloxicam.  Considering knee replacement soon.    Typical carcinoid tumor of RLL: Seeing Dr. Saeed.  S/p right lobectomy.  Cough has mostly resolved, reduced albuterol usage.  Right anterior abdominal bulge considered expected sequela of CT surgery.  Has seen slight decrease in exertional tolerance/stamina but is mild/manageable.   HTN: BP at goal on irbesartan-hctz.  Sees Dr. Leija as needed.  HLD: Controlled on crestor.  LDL 92 last check.  On every other day to avoid joint pain.  BPH, elevated PSA: Sees Dr. Flores.  On cialis daily. Had recent MRI which did not show any cancer. Last PSA 4.0, 13%.    Gilbert disease: LFTs stable.  Anxiety: Controlled on lorazepam as needed.    Alopecia: Sees dermatology.  On minoxidil currently.  Flu shot UTD.  Td within 10 yrs.  Plan to revaccinate in 2025.  Shingrix discussed.  Pneumonia vaccine due age 63.  COVID UTD.  Cscope Dr. Solano, 4-5 yrs ago, no polyps, 10 yr interval.        Past Medical History:  History reviewed. No pertinent past medical history.    Past Surgical History:   has a past surgical history that includes Rotator cuff repair (Right); Hand surgery (Right); Knee surgery; robotic bronchoscopy (N/A, 05/06/2022); Hernia repair (1961); Tonsillectomy (1963); Vasectomy (1998); xi robotic rats,with lobectomy,lung (Right, 6/27/2022); and Injection of anesthetic agent around multiple intercostal nerves (Right, 6/27/2022).    Family History:  family history includes Arthritis in his father and sister; Asthma in his son; Cancer in his brother; Diabetes in his father;  Drug abuse in his brother; Heart disease in his father and mother; Hypertension in his mother; Learning disabilities in his brother and son.     Social History:  Social History     Tobacco Use    Smoking status: Light Smoker     Packs/day: 0.00     Years: 10.00     Pack years: 0.00     Types: Cigarettes, Cigars    Smokeless tobacco: Never   Substance Use Topics    Alcohol use: Yes     Alcohol/week: 10.0 standard drinks     Types: 10 Glasses of wine per week    Drug use: Never       I personally reviewed all past medical, surgical, social and family history.    Review of Systems   Constitutional:  Negative for chills, fever and malaise/fatigue.   Respiratory:  Negative for shortness of breath.    Cardiovascular:  Negative for chest pain.   Gastrointestinal:  Negative for constipation, diarrhea, nausea and vomiting.   Skin:  Negative for rash.   Neurological:  Negative for weakness.   All other systems reviewed and are negative.     Medications:  Outpatient Encounter Medications as of 6/9/2023   Medication Sig Note Dispense Refill    albuterol (PROVENTIL) 2.5 mg /3 mL (0.083 %) nebulizer solution Take 3 mLs (2.5 mg total) by nebulization 3 (three) times daily as needed for Wheezing or Shortness of Breath.  180 each 2    albuterol (VENTOLIN HFA) 90 mcg/actuation inhaler Inhale 2 puffs into the lungs every 6 (six) hours as needed for Wheezing. Rescue  18 g 6    aspirin (ECOTRIN) 81 MG EC tablet Take 81 mg by mouth once daily.       COLLAGEN MISC by Misc.(Non-Drug; Combo Route) route.       esomeprazole magnesium (NEXIUM 24HR ORAL) Take by mouth.       fluticasone-salmeterol diskus inhaler 250-50 mcg Inhale 1 puff into the lungs 2 (two) times daily. Controller  60 each 4    folic acid/multivit-min/lutein (CENTRUM SILVER ORAL) Take by mouth.       irbesartan-hydrochlorothiazide (AVALIDE) 300-12.5 mg per tablet Take 1 tablet by mouth once daily.  90 tablet 3    loratadine (CLARITIN ORAL) Take by mouth.       LORazepam  "(ATIVAN) 1 MG tablet Take 1 tablet (1 mg total) by mouth daily as needed for Anxiety.  30 tablet 3    minoxidiL (LONITEN) 2.5 MG tablet Take 5 mg by mouth nightly.       omega 3-dha-epa-fish oil 1,200 (144-216) mg Cap Take by mouth.       rosuvastatin (CRESTOR) 20 MG tablet Take 1 tablet (20 mg total) by mouth every other day.  90 tablet 3    tadalafiL (CIALIS) 5 MG tablet Take 5 mg by mouth daily as needed for Erectile Dysfunction. 6/24/2022: Hold am of surgery       VIOS AEROSOL DELIVERY SYSTEM Georgette Take by nebulization as needed.       [DISCONTINUED] hydroCHLOROthiazide (MICROZIDE) 12.5 mg capsule hydrochlorothiazide Take No date recorded No form recorded No frequency recorded No route recorded No set duration recorded No set duration amount recorded active No dosage strength recorded No dosage strength units of measure recorded        No facility-administered encounter medications on file as of 6/9/2023.       Allergies:  Review of patient's allergies indicates:  No Known Allergies    Health Maintenance:  Immunization History   Administered Date(s) Administered    COVID-19, MRNA, LN-S, PF (MODERNA FULL 0.5 ML DOSE) 02/25/2021, 03/23/2021    Influenza - Quadrivalent - [facility] - PF 10/23/2019, 09/16/2020, 11/21/2022    Influenza - Quadrivalent - PF *Preferred* (6 months and older) 12/08/2021      Health Maintenance   Topic Date Due    TETANUS VACCINE  Never done    Lipid Panel  12/08/2027    Hepatitis C Screening  Completed    Abdominal Aortic Aneurysm Screening  Completed        Physical Exam      Vital Signs  Pulse: 92  SpO2: 97 %  BP: 138/80  BP Location: Left arm  Patient Position: Sitting  Pain Score: 0-No pain  Height and Weight  Height: 5' 10" (177.8 cm)  Weight: 97.7 kg (215 lb 6.2 oz)  BSA (Calculated - sq m): 2.2 sq meters  BMI (Calculated): 30.9  Weight in (lb) to have BMI = 25: 173.9]    Physical Exam  Vitals reviewed.   Constitutional:       Appearance: He is well-developed.   HENT:      Head: " Normocephalic and atraumatic.      Right Ear: External ear normal.      Left Ear: External ear normal.   Cardiovascular:      Rate and Rhythm: Normal rate and regular rhythm.      Heart sounds: Normal heart sounds. No murmur heard.  Pulmonary:      Effort: Pulmonary effort is normal.      Breath sounds: Normal breath sounds. No wheezing or rales.   Abdominal:      General: Bowel sounds are normal.      Palpations: Abdomen is soft.        Laboratory:  CBC:  Recent Labs   Lab 12/04/20  0744 06/04/21  0828 07/19/22  1058   WBC 5.2 6.4 6.45   RBC 5.02 4.99 5.04   Hemoglobin 15.6 15.6 15.3   Hematocrit 45.8 44.6 44.6   Platelets 245 248 337   MCV 91.2 89.4 89   MCH 31.1 31.2 30.4   MCHC 34.2 34.9 34.3     CMP:  Recent Labs   Lab 12/06/21  0956 06/03/22  0843 12/08/22  0815   Glucose 111 H 107 H 105 H   Calcium 9.6 9.6 9.4   ALBUMIN 4.9 4.8 4.6   Total Protein 7.0 7.0 6.8   Sodium 138 141 136   Potassium 4.4 4.8 4.5   CO2 25 27 22   Chloride 99 102 100   BUN 16 11.0 17.0   Alkaline Phosphatase 84 94 84   ALT 24 25 19   AST 24 22 18   Total Bilirubin 2.2 H 1.1 1.0     URINALYSIS:       LIPIDS:  Recent Labs   Lab 12/04/20  0744 06/04/21  0828 12/06/21  0956 06/03/22  0843 12/08/22  0815   TSH 3.58 2.44  --   --   --    HDL 52 62 70 52 53   Cholesterol 153 156 154 179 171   Triglycerides 128 123 87 122 163 H   LDL Calculated 81 74 70 107 92   Non-HDL Cholesterol 101 94 84  --   --      TSH:  Recent Labs   Lab 12/04/20  0744 06/04/21  0828   TSH 3.58 2.44     A1C:  Recent Labs   Lab 12/10/21  1009 06/03/22  0843 12/08/22  0815   Hemoglobin A1C 5.5 5.3 5.4       Assessment/Plan     Justus Shaikh is a 63 y.o.male with:    1. Carcinoid tumor of right lung  F/U with pulmonology as scheduled.    2. Essential hypertension  Continue current meds.    3. Aortic atherosclerosis  Continue current meds.    4. Hyperlipidemia, mixed  - Comprehensive Metabolic Panel; Future  - Lipid Panel; Future  Continue current meds.    5. Elevated PSA  -  PSA, TOTAL AND FREE; Future  6. Benign prostatic hyperplasia, unspecified whether lower urinary tract symptoms present  Continue current meds.  Update labs.  F/U with urology.    7. Impaired fasting glucose  - Hemoglobin A1C; Future       Chronic conditions status updated as per HPI.  Other than changes above, cont current medications and maintain follow up with specialists.  Follow up in about 6 months (around 12/9/2023) for Annual preventative visit.    Future Appointments   Date Time Provider Department Center   11/15/2023  8:30 AM [facility] OI-CT2 500 LB LIMIT [facility]   11/15/2023  9:45 AM Osvaldo Saeed MD [facility] THORAC Sam Worthington MD  [facility]

## 2023-06-14 ENCOUNTER — PATIENT MESSAGE (OUTPATIENT)
Dept: PRIMARY CARE CLINIC | Facility: CLINIC | Age: 63
End: 2023-06-14
Payer: MEDICARE

## 2023-06-18 LAB
ALBUMIN: 5 G/DL (ref 3.5–5)
ALP SERPL-CCNC: 95 U/L (ref 38–126)
ALT SERPL W P-5'-P-CCNC: 17 U/L (ref 7–56)
ANION GAP SERPL CALC-SCNC: 15.7 MMOL/L (ref 9–18)
AST SERPL-CCNC: 21 U/L (ref 7–40)
BILIRUB SERPL-MCNC: 1.2 MG/DL (ref 0–1.2)
BUN BLD-MCNC: 17 MG/DL (ref 7–21)
BUN/CREAT SERPL: 18 (ref 6–22)
CALC OSMOLALITY: 270 MOSM/KG (ref 275–295)
CALCIUM SERPL-MCNC: 9.4 MG/DL (ref 8.5–10.3)
CHLORIDE SERPL-SCNC: 97 MMOL/L (ref 98–107)
CHOL/HDLC RATIO: 2.69
CHOLEST SERPL-MSCNC: 172 MG/DL (ref 100–200)
CO2 SERPL-SCNC: 26 MMOL/L (ref 21–31)
CREAT SERPL-MCNC: 0.93 MG/DL (ref 0.7–1.2)
EGFR: 91 ML/MIN
GLUCOSE SERPL-MCNC: 102 MG/DL (ref 70–100)
HBA1C MFR BLD: 5.5 % (ref 4.5–5.7)
HDLC SERPL-MCNC: 64 MG/DL (ref 40–75)
LDLC SERPL CALC-MCNC: 87 MG/DL (ref 0–130)
POTASSIUM SERPL-SCNC: 4.7 MMOL/L (ref 3.5–5)
PSA FREE MFR SERPL: 16 % (CALC)
PSA FREE SERPL-MCNC: 0.7 NG/ML
PSA SERPL-MCNC: 4.4 NG/ML
SODIUM BLD-SCNC: 134 MMOL/L (ref 135–145)
TOTAL PROTEIN: 6.9 G/DL (ref 6.3–8.2)
TRIGL SERPL-MCNC: 133 MG/DL (ref 30–150)

## 2023-06-21 ENCOUNTER — PES CALL (OUTPATIENT)
Dept: ADMINISTRATIVE | Facility: CLINIC | Age: 63
End: 2023-06-21
Payer: MEDICARE

## 2023-07-06 ENCOUNTER — TELEPHONE (OUTPATIENT)
Dept: PRIMARY CARE CLINIC | Facility: CLINIC | Age: 63
End: 2023-07-06
Payer: MEDICARE

## 2023-07-06 NOTE — TELEPHONE ENCOUNTER
----- Message from Karin Baez sent at 7/6/2023  9:47 AM CDT -----  Contact: pt [phone]  Patient is having knee surgery on 10/23/2023. Requesting an appt between 09/25/2023-09/29/2023.    Patient will be out of town 10/02-10/13    Thank you

## 2023-09-26 ENCOUNTER — LAB VISIT (OUTPATIENT)
Dept: LAB | Facility: HOSPITAL | Age: 63
End: 2023-09-26
Attending: NURSE PRACTITIONER
Payer: MEDICARE

## 2023-09-26 ENCOUNTER — OFFICE VISIT (OUTPATIENT)
Dept: PRIMARY CARE CLINIC | Facility: CLINIC | Age: 63
End: 2023-09-26
Payer: MEDICARE

## 2023-09-26 VITALS
OXYGEN SATURATION: 95 % | HEART RATE: 78 BPM | DIASTOLIC BLOOD PRESSURE: 92 MMHG | HEIGHT: 70 IN | SYSTOLIC BLOOD PRESSURE: 165 MMHG | BODY MASS INDEX: 31.16 KG/M2 | WEIGHT: 217.63 LBS | RESPIRATION RATE: 18 BRPM

## 2023-09-26 DIAGNOSIS — I70.0 AORTIC ATHEROSCLEROSIS: ICD-10-CM

## 2023-09-26 DIAGNOSIS — D86.0 SARCOIDOSIS OF LUNG: ICD-10-CM

## 2023-09-26 DIAGNOSIS — C34.31 MALIGNANT NEOPLASM OF LOWER LOBE OF RIGHT LUNG: ICD-10-CM

## 2023-09-26 DIAGNOSIS — I10 ESSENTIAL HYPERTENSION: ICD-10-CM

## 2023-09-26 DIAGNOSIS — E78.2 HYPERLIPIDEMIA, MIXED: ICD-10-CM

## 2023-09-26 DIAGNOSIS — R73.01 IMPAIRED FASTING GLUCOSE: ICD-10-CM

## 2023-09-26 DIAGNOSIS — M17.11 PRIMARY OSTEOARTHRITIS OF RIGHT KNEE: ICD-10-CM

## 2023-09-26 DIAGNOSIS — F41.9 ANXIETY: ICD-10-CM

## 2023-09-26 DIAGNOSIS — N40.0 BENIGN PROSTATIC HYPERPLASIA, UNSPECIFIED WHETHER LOWER URINARY TRACT SYMPTOMS PRESENT: ICD-10-CM

## 2023-09-26 DIAGNOSIS — Z01.818 PRE-OP EXAMINATION: Primary | ICD-10-CM

## 2023-09-26 DIAGNOSIS — Z01.818 PRE-OP EXAMINATION: ICD-10-CM

## 2023-09-26 LAB
ALBUMIN SERPL BCP-MCNC: 4.3 G/DL (ref 3.5–5.2)
ALP SERPL-CCNC: 78 U/L (ref 55–135)
ALT SERPL W/O P-5'-P-CCNC: 21 U/L (ref 10–44)
ANION GAP SERPL CALC-SCNC: 9 MMOL/L (ref 8–16)
APTT PPP: 28.2 SEC (ref 21–32)
AST SERPL-CCNC: 23 U/L (ref 10–40)
BASOPHILS # BLD AUTO: 0.05 K/UL (ref 0–0.2)
BASOPHILS NFR BLD: 1 % (ref 0–1.9)
BILIRUB SERPL-MCNC: 2 MG/DL (ref 0.1–1)
BUN SERPL-MCNC: 13 MG/DL (ref 8–23)
CALCIUM SERPL-MCNC: 9.5 MG/DL (ref 8.7–10.5)
CHLORIDE SERPL-SCNC: 99 MMOL/L (ref 95–110)
CO2 SERPL-SCNC: 28 MMOL/L (ref 23–29)
CREAT SERPL-MCNC: 0.9 MG/DL (ref 0.5–1.4)
DIFFERENTIAL METHOD: ABNORMAL
EOSINOPHIL # BLD AUTO: 0.1 K/UL (ref 0–0.5)
EOSINOPHIL NFR BLD: 2.4 % (ref 0–8)
ERYTHROCYTE [DISTWIDTH] IN BLOOD BY AUTOMATED COUNT: 12.7 % (ref 11.5–14.5)
EST. GFR  (NO RACE VARIABLE): >60 ML/MIN/1.73 M^2
ESTIMATED AVG GLUCOSE: 103 MG/DL (ref 68–131)
GLUCOSE SERPL-MCNC: 93 MG/DL (ref 70–110)
HBA1C MFR BLD: 5.2 % (ref 4–5.6)
HCT VFR BLD AUTO: 39.8 % (ref 40–54)
HGB BLD-MCNC: 14.5 G/DL (ref 14–18)
IMM GRANULOCYTES # BLD AUTO: 0.02 K/UL (ref 0–0.04)
IMM GRANULOCYTES NFR BLD AUTO: 0.4 % (ref 0–0.5)
INR PPP: 1 (ref 0.8–1.2)
LYMPHOCYTES # BLD AUTO: 1.3 K/UL (ref 1–4.8)
LYMPHOCYTES NFR BLD: 25.7 % (ref 18–48)
MCH RBC QN AUTO: 30.7 PG (ref 27–31)
MCHC RBC AUTO-ENTMCNC: 36.4 G/DL (ref 32–36)
MCV RBC AUTO: 84 FL (ref 82–98)
MONOCYTES # BLD AUTO: 0.5 K/UL (ref 0.3–1)
MONOCYTES NFR BLD: 10.8 % (ref 4–15)
NEUTROPHILS # BLD AUTO: 3 K/UL (ref 1.8–7.7)
NEUTROPHILS NFR BLD: 59.7 % (ref 38–73)
NRBC BLD-RTO: 0 /100 WBC
PLATELET # BLD AUTO: 248 K/UL (ref 150–450)
PMV BLD AUTO: 9.2 FL (ref 9.2–12.9)
POTASSIUM SERPL-SCNC: 3.8 MMOL/L (ref 3.5–5.1)
PROT SERPL-MCNC: 7.2 G/DL (ref 6–8.4)
PROTHROMBIN TIME: 10.3 SEC (ref 9–12.5)
RBC # BLD AUTO: 4.72 M/UL (ref 4.6–6.2)
SODIUM SERPL-SCNC: 136 MMOL/L (ref 136–145)
WBC # BLD AUTO: 4.99 K/UL (ref 3.9–12.7)

## 2023-09-26 PROCEDURE — 99499 UNLISTED E&M SERVICE: CPT | Mod: S$GLB,,, | Performed by: NURSE PRACTITIONER

## 2023-09-26 PROCEDURE — 3080F DIAST BP >= 90 MM HG: CPT | Mod: CPTII,S$GLB,, | Performed by: NURSE PRACTITIONER

## 2023-09-26 PROCEDURE — 1160F PR REVIEW ALL MEDS BY PRESCRIBER/CLIN PHARMACIST DOCUMENTED: ICD-10-PCS | Mod: CPTII,S$GLB,, | Performed by: NURSE PRACTITIONER

## 2023-09-26 PROCEDURE — 85025 COMPLETE CBC W/AUTO DIFF WBC: CPT | Mod: HCNC | Performed by: NURSE PRACTITIONER

## 2023-09-26 PROCEDURE — 1159F PR MEDICATION LIST DOCUMENTED IN MEDICAL RECORD: ICD-10-PCS | Mod: CPTII,S$GLB,, | Performed by: NURSE PRACTITIONER

## 2023-09-26 PROCEDURE — 93005 ELECTROCARDIOGRAM TRACING: CPT | Mod: S$GLB,,, | Performed by: NURSE PRACTITIONER

## 2023-09-26 PROCEDURE — 3044F HG A1C LEVEL LT 7.0%: CPT | Mod: CPTII,S$GLB,, | Performed by: NURSE PRACTITIONER

## 2023-09-26 PROCEDURE — 3080F PR MOST RECENT DIASTOLIC BLOOD PRESSURE >= 90 MM HG: ICD-10-PCS | Mod: CPTII,S$GLB,, | Performed by: NURSE PRACTITIONER

## 2023-09-26 PROCEDURE — 99499 RISK ADDL DX/OHS AUDIT: ICD-10-PCS | Mod: S$GLB,,, | Performed by: NURSE PRACTITIONER

## 2023-09-26 PROCEDURE — 3008F PR BODY MASS INDEX (BMI) DOCUMENTED: ICD-10-PCS | Mod: CPTII,S$GLB,, | Performed by: NURSE PRACTITIONER

## 2023-09-26 PROCEDURE — 93010 ELECTROCARDIOGRAM REPORT: CPT | Mod: S$GLB,,, | Performed by: INTERNAL MEDICINE

## 2023-09-26 PROCEDURE — 3044F PR MOST RECENT HEMOGLOBIN A1C LEVEL <7.0%: ICD-10-PCS | Mod: CPTII,S$GLB,, | Performed by: NURSE PRACTITIONER

## 2023-09-26 PROCEDURE — 99999 PR PBB SHADOW E&M-EST. PATIENT-LVL IV: CPT | Mod: PBBFAC,,, | Performed by: NURSE PRACTITIONER

## 2023-09-26 PROCEDURE — 99999 PR PBB SHADOW E&M-EST. PATIENT-LVL IV: ICD-10-PCS | Mod: PBBFAC,,, | Performed by: NURSE PRACTITIONER

## 2023-09-26 PROCEDURE — 80053 COMPREHEN METABOLIC PANEL: CPT | Mod: HCNC | Performed by: NURSE PRACTITIONER

## 2023-09-26 PROCEDURE — 93005 EKG 12-LEAD: ICD-10-PCS | Mod: S$GLB,,, | Performed by: NURSE PRACTITIONER

## 2023-09-26 PROCEDURE — 99214 PR OFFICE/OUTPT VISIT, EST, LEVL IV, 30-39 MIN: ICD-10-PCS | Mod: S$GLB,,, | Performed by: NURSE PRACTITIONER

## 2023-09-26 PROCEDURE — 3077F SYST BP >= 140 MM HG: CPT | Mod: CPTII,S$GLB,, | Performed by: NURSE PRACTITIONER

## 2023-09-26 PROCEDURE — 83036 HEMOGLOBIN GLYCOSYLATED A1C: CPT | Mod: HCNC | Performed by: NURSE PRACTITIONER

## 2023-09-26 PROCEDURE — 85610 PROTHROMBIN TIME: CPT | Mod: HCNC | Performed by: NURSE PRACTITIONER

## 2023-09-26 PROCEDURE — 93010 EKG 12-LEAD: ICD-10-PCS | Mod: S$GLB,,, | Performed by: INTERNAL MEDICINE

## 2023-09-26 PROCEDURE — 3077F PR MOST RECENT SYSTOLIC BLOOD PRESSURE >= 140 MM HG: ICD-10-PCS | Mod: CPTII,S$GLB,, | Performed by: NURSE PRACTITIONER

## 2023-09-26 PROCEDURE — 85730 THROMBOPLASTIN TIME PARTIAL: CPT | Mod: HCNC | Performed by: NURSE PRACTITIONER

## 2023-09-26 PROCEDURE — 1160F RVW MEDS BY RX/DR IN RCRD: CPT | Mod: CPTII,S$GLB,, | Performed by: NURSE PRACTITIONER

## 2023-09-26 PROCEDURE — 3008F BODY MASS INDEX DOCD: CPT | Mod: CPTII,S$GLB,, | Performed by: NURSE PRACTITIONER

## 2023-09-26 PROCEDURE — 1159F MED LIST DOCD IN RCRD: CPT | Mod: CPTII,S$GLB,, | Performed by: NURSE PRACTITIONER

## 2023-09-26 PROCEDURE — 99214 OFFICE O/P EST MOD 30 MIN: CPT | Mod: S$GLB,,, | Performed by: NURSE PRACTITIONER

## 2023-09-26 PROCEDURE — 36415 COLL VENOUS BLD VENIPUNCTURE: CPT | Performed by: NURSE PRACTITIONER

## 2023-09-26 NOTE — PROGRESS NOTES
[facility] Note    Chief Complaint      Chief Complaint   Patient presents with    Pre-op Exam     History of Present Illness      Justus Shaikh is a 63 y.o. male patient of Dr. Worthington's with chronic conditions of anxiety, malignant right lower lobe lung mass- removed 6/2022, HLD, HTN, aortic atherosclerosis, BPH, who presents today for medical clearance for  Right TKA with Dr. Rojo at [facility] on 10/23/23.  Patient feeling well, denies shortness of breath or chest pain, reviewed meds and history with patient.    Allergies-Sudafed cold and allergy med-causes palpitations  SOPHIE-none  Reactions to anesthesia-none    V/S-140/80, 78, 18,95% RA      Health Maintenance   Topic Date Due    TETANUS VACCINE  Never done    Colorectal Cancer Screening  Never done    Shingles Vaccine (2 of 2) 05/14/2023    High Dose Statin  09/26/2024    Lipid Panel  06/16/2028    Hepatitis C Screening  Completed    Abdominal Aortic Aneurysm Screening  Completed       No past medical history on file.    Past Surgical History:   Procedure Laterality Date    HAND SURGERY Right     HERNIA REPAIR  1961    INJECTION OF ANESTHETIC AGENT AROUND MULTIPLE INTERCOSTAL NERVES Right 6/27/2022    Procedure: BLOCK, NERVE, INTERCOSTAL, 2 OR MORE;  Surgeon: Osvaldo Saeed MD;  Location: [facility] OR [address] [place] [address];  Service: Thoracic;  Laterality: Right;    KNEE SURGERY      ROBOTIC BRONCHOSCOPY N/A 05/06/2022    Procedure: ROBOTIC BRONCHOSCOPY with fluro  ;  Surgeon: Chava Mcdonald MD;  Location: [address];  Service: Pulmonary;  Laterality: N/A;    ROTATOR CUFF REPAIR Right     TONSILLECTOMY  1963    VASECTOMY  1998    XI ROBOTIC RATS,WITH LOBECTOMY,LUNG Right 6/27/2022    Procedure: XI ROBOTIC RATS,WITH Right Lower LOBECTOMY,LUNG;  Surgeon: Osvaldo Saeed MD;  Location: [address];  Service: Thoracic;  Laterality: Right;       family history includes Arthritis in his father and sister; Asthma in his son; Cancer in his brother;  Diabetes in his father; Drug abuse in his brother; Heart disease in his father and mother; Hypertension in his mother; Learning disabilities in his brother and son.     Social History     Tobacco Use    Smoking status: Light Smoker     Types: Cigars    Smokeless tobacco: Never   Substance Use Topics    Alcohol use: Yes     Alcohol/week: 10.0 standard drinks of alcohol     Types: 10 Glasses of wine per week    Drug use: Never       Review of Systems   Constitutional:  Negative for chills and fever.   HENT:  Negative for congestion, sinus pain and sore throat.    Eyes:  Negative for blurred vision.   Respiratory:  Negative for cough, shortness of breath and wheezing.    Cardiovascular:  Negative for chest pain, palpitations and leg swelling.   Gastrointestinal:  Negative for abdominal pain, constipation, diarrhea, nausea and vomiting.   Genitourinary:  Negative for dysuria.   Musculoskeletal:  Negative for myalgias.   Skin:  Negative for rash.   Neurological:  Negative for dizziness, weakness and headaches.   Psychiatric/Behavioral:  Negative for depression. The patient is not nervous/anxious.         Outpatient Encounter Medications as of 9/26/2023   Medication Sig Note Dispense Refill    aspirin (ECOTRIN) 81 MG EC tablet Take 81 mg by mouth once daily.       COLLAGEN MISC by Misc.(Non-Drug; Combo Route) route.       esomeprazole magnesium (NEXIUM 24HR ORAL) Take by mouth.       folic acid/multivit-min/lutein (CENTRUM SILVER ORAL) Take by mouth.       irbesartan-hydrochlorothiazide (AVALIDE) 300-12.5 mg per tablet Take 1 tablet by mouth once daily.  90 tablet 3    LORazepam (ATIVAN) 1 MG tablet Take 1 tablet (1 mg total) by mouth daily as needed for Anxiety.  30 tablet 3    omega 3-dha-epa-fish oil 1,200 (144-216) mg Cap Take by mouth.       rosuvastatin (CRESTOR) 20 MG tablet Take 1 tablet (20 mg total) by mouth every other day.  90 tablet 3    VIOS AEROSOL DELIVERY SYSTEM Georgette Take by nebulization as needed.        "albuterol (PROVENTIL) 2.5 mg /3 mL (0.083 %) nebulizer solution Take 3 mLs (2.5 mg total) by nebulization 3 (three) times daily as needed for Wheezing or Shortness of Breath.  180 each 2    albuterol (VENTOLIN HFA) 90 mcg/actuation inhaler Inhale 2 puffs into the lungs every 6 (six) hours as needed for Wheezing. Rescue (Patient not taking: Reported on 9/26/2023)  18 g 6    fluticasone-salmeterol diskus inhaler 250-50 mcg Inhale 1 puff into the lungs 2 (two) times daily. Controller  60 each 4    loratadine (CLARITIN ORAL) Take by mouth.       minoxidiL (LONITEN) 2.5 MG tablet Take 5 mg by mouth nightly.       tadalafiL (CIALIS) 5 MG tablet Take 5 mg by mouth daily as needed for Erectile Dysfunction. 6/24/2022: Hold am of surgery       [DISCONTINUED] hydroCHLOROthiazide (MICROZIDE) 12.5 mg capsule hydrochlorothiazide Take No date recorded No form recorded No frequency recorded No route recorded No set duration recorded No set duration amount recorded active No dosage strength recorded No dosage strength units of measure recorded        No facility-administered encounter medications on file as of 9/26/2023.       Review of patient's allergies indicates:   Allergen Reactions    Sudafed cold-allergy Palpitations       Physical Exam      Vital Signs  Pulse: 78  Resp: 18  SpO2: 95 %  BP: (!) 165/92  BP Location: Right arm  Patient Position: Sitting  Height and Weight  Height: 5' 10" (177.8 cm)  Weight: 98.7 kg (217 lb 9.5 oz)  BSA (Calculated - sq m): 2.21 sq meters  BMI (Calculated): 31.2  Weight in (lb) to have BMI = 25: 173.9    Physical Exam  Vitals and nursing note reviewed.   Constitutional:       General: He is not in acute distress.     Appearance: Normal appearance. He is well-developed. He is not ill-appearing.   HENT:      Head: Normocephalic and atraumatic.      Right Ear: External ear normal.      Left Ear: External ear normal.   Eyes:      Conjunctiva/sclera: Conjunctivae normal.      Pupils: Pupils are " equal, round, and reactive to light.   Neck:      Thyroid: No thyromegaly.      Vascular: No JVD.      Trachea: No tracheal deviation.   Cardiovascular:      Rate and Rhythm: Normal rate and regular rhythm.      Heart sounds: Normal heart sounds.   Pulmonary:      Effort: Pulmonary effort is normal. No respiratory distress.      Breath sounds: Normal breath sounds.   Abdominal:      General: Bowel sounds are normal. There is no distension.      Palpations: Abdomen is soft.      Tenderness: There is no abdominal tenderness.   Musculoskeletal:         General: Normal range of motion.      Cervical back: Normal range of motion and neck supple.   Lymphadenopathy:      Cervical: No cervical adenopathy.   Skin:     General: Skin is warm and dry.      Coloration: Skin is not pale.      Findings: No erythema or rash.   Neurological:      General: No focal deficit present.      Mental Status: He is alert and oriented to person, place, and time.   Psychiatric:         Mood and Affect: Mood normal.         Behavior: Behavior normal.         Thought Content: Thought content normal.         Judgment: Judgment normal.          Laboratory:  CBC:  Lab Results   Component Value Date    WBC 4.99 09/26/2023    RBC 4.72 09/26/2023    HGB 14.5 09/26/2023    HCT 39.8 (L) 09/26/2023     09/26/2023    MCV 84 09/26/2023    MCH 30.7 09/26/2023    MCHC 36.4 (H) 09/26/2023    MCHC 34.3 07/19/2022    MCHC 34.9 06/04/2021     CMP:  Lab Results   Component Value Date    GLU 93 09/26/2023    CALCIUM 9.5 09/26/2023    ALBUMIN 4.3 09/26/2023    PROT 7.2 09/26/2023     09/26/2023    K 3.8 09/26/2023    CO2 28 09/26/2023    CL 99 09/26/2023    BUN 13 09/26/2023    ALKPHOS 78 09/26/2023    ALT 21 09/26/2023    AST 23 09/26/2023    BILITOT 2.0 (H) 09/26/2023    BILITOT 1.2 06/16/2023    BILITOT 1.0 12/08/2022     URINALYSIS:  Lab Results   Component Value Date    COLORU Yellow 09/26/2023    SPECGRAV 1.010 09/26/2023    PHUR 7.0 09/26/2023     PROTEINUA Negative 09/26/2023    NITRITE Negative 09/26/2023    LEUKOCYTESUR Negative 09/26/2023      LIPIDS:  Lab Results   Component Value Date    TSH 2.44 06/04/2021    TSH 3.58 12/04/2020    HDL 64 06/16/2023    HDL 53 12/08/2022    HDL 52 06/03/2022    CHOL 172 06/16/2023    CHOL 171 12/08/2022    CHOL 179 06/03/2022    TRIG 133 06/16/2023    TRIG 163 (H) 12/08/2022    TRIG 122 06/03/2022    LDLCALC 87 06/16/2023    LDLCALC 92 12/08/2022    LDLCALC 107 06/03/2022    NONHDLCHOL 84 12/06/2021    NONHDLCHOL 94 06/04/2021    NONHDLCHOL 101 12/04/2020     TSH:  Lab Results   Component Value Date    TSH 2.44 06/04/2021    TSH 3.58 12/04/2020     A1C:  Lab Results   Component Value Date    HGBA1C 5.2 09/26/2023    HGBA1C 5.5 06/16/2023    HGBA1C 5.4 12/08/2022    HGBA1C 5.3 06/03/2022    HGBA1C 5.5 12/10/2021         Assessment/Plan     Justus Shaikh is a 63 y.o.male with:    Pre-op examination  -     CBC Auto Differential; Future; Expected date: 09/26/2023  -     Comprehensive Metabolic Panel; Future; Expected date: 09/26/2023  -     Hemoglobin A1C; Future; Expected date: 09/26/2023  -     Cancel: Urinalysis  -     PROTIME-INR; Future; Expected date: 09/26/2023  -     APTT; Future; Expected date: 09/26/2023  -     IN OFFICE EKG 12-LEAD (to Muse)  -     Urinalysis; Future; Expected date: 09/26/2023    Primary osteoarthritis of right knee  -     CBC Auto Differential; Future; Expected date: 09/26/2023  -     Comprehensive Metabolic Panel; Future; Expected date: 09/26/2023  -     Hemoglobin A1C; Future; Expected date: 09/26/2023  -     Cancel: Urinalysis  -     PROTIME-INR; Future; Expected date: 09/26/2023  -     APTT; Future; Expected date: 09/26/2023  -     IN OFFICE EKG 12-LEAD (to Muse)  -     Urinalysis; Future; Expected date: 09/26/2023    Essential hypertension  -     CBC Auto Differential; Future; Expected date: 09/26/2023  -     Comprehensive Metabolic Panel; Future; Expected date: 09/26/2023  -      Hemoglobin A1C; Future; Expected date: 09/26/2023  -     Cancel: Urinalysis  -     PROTIME-INR; Future; Expected date: 09/26/2023  -     APTT; Future; Expected date: 09/26/2023  -     IN OFFICE EKG 12-LEAD (to Muse)  -     Urinalysis; Future; Expected date: 09/26/2023    Impaired fasting glucose  -     CBC Auto Differential; Future; Expected date: 09/26/2023  -     Comprehensive Metabolic Panel; Future; Expected date: 09/26/2023  -     Hemoglobin A1C; Future; Expected date: 09/26/2023  -     Cancel: Urinalysis  -     PROTIME-INR; Future; Expected date: 09/26/2023  -     APTT; Future; Expected date: 09/26/2023  -     IN OFFICE EKG 12-LEAD (to Muse)  -     Urinalysis; Future; Expected date: 09/26/2023    Anxiety  -     CBC Auto Differential; Future; Expected date: 09/26/2023  -     Comprehensive Metabolic Panel; Future; Expected date: 09/26/2023  -     Hemoglobin A1C; Future; Expected date: 09/26/2023  -     Cancel: Urinalysis  -     PROTIME-INR; Future; Expected date: 09/26/2023  -     APTT; Future; Expected date: 09/26/2023  -     IN OFFICE EKG 12-LEAD (to Muse)  -     Urinalysis; Future; Expected date: 09/26/2023    Hyperlipidemia, mixed  -     CBC Auto Differential; Future; Expected date: 09/26/2023  -     Comprehensive Metabolic Panel; Future; Expected date: 09/26/2023  -     Hemoglobin A1C; Future; Expected date: 09/26/2023  -     Cancel: Urinalysis  -     PROTIME-INR; Future; Expected date: 09/26/2023  -     APTT; Future; Expected date: 09/26/2023  -     IN OFFICE EKG 12-LEAD (to Muse)  -     Urinalysis; Future; Expected date: 09/26/2023    Aortic atherosclerosis  -     CBC Auto Differential; Future; Expected date: 09/26/2023  -     Comprehensive Metabolic Panel; Future; Expected date: 09/26/2023  -     Hemoglobin A1C; Future; Expected date: 09/26/2023  -     Cancel: Urinalysis  -     PROTIME-INR; Future; Expected date: 09/26/2023  -     APTT; Future; Expected date: 09/26/2023  -     IN OFFICE EKG 12-LEAD (to  Muse)  -     Urinalysis; Future; Expected date: 09/26/2023    Sarcoidosis of lung  -     APTT; Future; Expected date: 09/26/2023    Malignant neoplasm of lower lobe of right lung    Benign prostatic hyperplasia, unspecified whether lower urinary tract symptoms present         Health Maintenance Due   Topic Date Due    Pneumococcal Vaccines (Age 65+) (1 - PCV) Never done    TETANUS VACCINE  Never done    Colorectal Cancer Screening  Never done    COVID-19 Vaccine (3 - Moderna series) 05/18/2021    Shingles Vaccine (2 of 2) 05/14/2023      Patient medically optimized for moderate risk procedure with low cardiac risk profile and may proceed with surgery    I spent 42 minutes on the day of this encounter for preparing for, evaluating, treating, and managing this patient.        -Continue current medications and maintain follow up with specialists.  Return to clinic as needed for any concerns.    No follow-ups on file.      Katey Chahal NP-C  [facility]

## 2023-10-16 ENCOUNTER — TELEPHONE (OUTPATIENT)
Dept: PRIMARY CARE CLINIC | Facility: CLINIC | Age: 63
End: 2023-10-16
Payer: MEDICARE

## 2023-10-16 DIAGNOSIS — E78.2 HYPERLIPIDEMIA, MIXED: ICD-10-CM

## 2023-10-16 DIAGNOSIS — R97.20 ELEVATED PSA: ICD-10-CM

## 2023-10-16 DIAGNOSIS — R73.01 IMPAIRED FASTING GLUCOSE: ICD-10-CM

## 2023-10-16 DIAGNOSIS — N40.0 BENIGN PROSTATIC HYPERPLASIA, UNSPECIFIED WHETHER LOWER URINARY TRACT SYMPTOMS PRESENT: ICD-10-CM

## 2023-10-16 DIAGNOSIS — I10 ESSENTIAL HYPERTENSION: Primary | ICD-10-CM

## 2023-10-16 NOTE — TELEPHONE ENCOUNTER
"----- Message from Elsy Gonzalez sent at 10/16/2023 11:04 AM CDT -----  Contact: [phone]  type: Lab    Caller is requesting to schedule their Lab appointment prior to annual appointment.  Order is not listed in EPIC.  Please enter order and contact patient to schedule.    Name of Caller:pt     Preferred Date and Time of Labs: week before     Date of Annual Physical Appointment:11/29/23    Where would they like the lab performed?[facility]     Would the patient rather a call back or a response via [facility]? Call back     Best Call Back Number:[phone]    Additional Information: would like labs sent tot [facility] and would like to have PSA done as well     "Do not send messages requesting lab orders prior to Physical appt on these providers: Tony Perera, Kathi Diaz,  Ankita Robledo and Yasir."        "

## 2023-11-09 NOTE — PROGRESS NOTES
"Subjective:       Patient ID: Justus Shaikh is a 63 y.o. male.    Chief Complaint: Follow-up      Diagnosis:  Typical carcinoid     Pre-operative therapy: none     Procedure(s) and date(s): 6/28/22: Right robotic assisted lower lobectomy with MLND    Pathology: 2.6 cm typical carcinoid. Levels 2,4,7,11,12 = negative. pT1cN0     Post-operative therapy: surveillance     62 year old male with HTN, HLD, Gilbert's Disease, and BPH who presents to clinic for 1.5 year follow up s/p right robotic assisted lower lobectomy with MLND for typical carcinoid of right lower lobe. Uncomplicated post-operative course. Here today with updated chest CT. Today patient reports he is doing well. Just returned from cruise with wife and states he walked several flights of stairs on the cruise without issue. Does report occasional dry cough at night.     Review of Systems   Constitutional:  Negative for activity change and fatigue.   Respiratory:  Positive for cough. Negative for shortness of breath.         NP cough when lying on right side   Cardiovascular:  Negative for chest pain.   Gastrointestinal: Negative.    Genitourinary: Negative.    Musculoskeletal: Negative.    Neurological: Negative.    Psychiatric/Behavioral: Negative.           Objective:       Vitals:    11/15/23 0950   BP: (!) 137/91   Pulse: 90   SpO2: 96%   Weight: 94.6 kg (208 lb 8.9 oz)   Height: 5' 10" (1.778 m)   PainSc: 0-No pain           Physical Exam  Vitals reviewed.   Constitutional:       Appearance: Normal appearance.   HENT:      Head: Atraumatic.   Eyes:      Extraocular Movements: Extraocular movements intact.   Cardiovascular:      Rate and Rhythm: Normal rate and regular rhythm.      Pulses: Normal pulses.      Heart sounds: Normal heart sounds.   Pulmonary:      Effort: Pulmonary effort is normal.      Breath sounds: Normal breath sounds.      Comments: Right robotic incisions well healed.   Abdominal:      General: Abdomen is flat.      Palpations: " Abdomen is soft.   Musculoskeletal:         General: Normal range of motion.      Cervical back: Normal range of motion and neck supple.   Skin:     General: Skin is warm and dry.      Capillary Refill: Capillary refill takes less than 2 seconds.   Neurological:      General: No focal deficit present.      Mental Status: He is alert and oriented to person, place, and time.   Psychiatric:         Mood and Affect: Mood normal.         Behavior: Behavior normal.       CXR 7/15/22: expected post op film. Small volume loss. No significant pleural fluid     Chest CT 05/17/23: images personally reviewed  Postoperative change of right lower lobectomy without specific evidence of recurrent or new metastatic disease in the chest.    Chest CT 11/15/23: images reviewed   Stable postoperative changes consistent with right lower lobectomy  No new pulmonary nodules or mediastinal adenopathy    Assessment:       63 y.o. male with HTN, HLD, Gilbert's Disease, and BPH who presents for follow up s/p right robotic assisted lower lobectomy with MLND for typical carcinoid of right lower lobe for pT1c RLL typical carcinoid. Node negative.   ARIAN    Plan:       RTC in 6 months with chest CT.

## 2023-11-15 ENCOUNTER — HOSPITAL ENCOUNTER (OUTPATIENT)
Dept: RADIOLOGY | Facility: HOSPITAL | Age: 63
Discharge: HOME | End: 2023-11-15
Attending: THORACIC SURGERY (CARDIOTHORACIC VASCULAR SURGERY)
Payer: MEDICARE

## 2023-11-15 ENCOUNTER — OFFICE VISIT (OUTPATIENT)
Dept: CARDIOTHORACIC SURGERY | Facility: CLINIC | Age: 63
End: 2023-11-15
Payer: MEDICARE

## 2023-11-15 VITALS
SYSTOLIC BLOOD PRESSURE: 137 MMHG | BODY MASS INDEX: 29.86 KG/M2 | OXYGEN SATURATION: 96 % | WEIGHT: 208.56 LBS | HEIGHT: 70 IN | DIASTOLIC BLOOD PRESSURE: 91 MMHG | HEART RATE: 90 BPM

## 2023-11-15 DIAGNOSIS — Z85.118 HISTORY OF LUNG CANCER: ICD-10-CM

## 2023-11-15 DIAGNOSIS — Z85.118 HISTORY OF LUNG CANCER: Primary | ICD-10-CM

## 2023-11-15 DIAGNOSIS — C7A.090 CARCINOID BRONCHIAL ADENOMA OF RIGHT LUNG: ICD-10-CM

## 2023-11-15 PROCEDURE — 3080F DIAST BP >= 90 MM HG: CPT | Mod: HCNC,CPTII,S$GLB, | Performed by: THORACIC SURGERY (CARDIOTHORACIC VASCULAR SURGERY)

## 2023-11-15 PROCEDURE — 3008F BODY MASS INDEX DOCD: CPT | Mod: HCNC,CPTII,S$GLB, | Performed by: THORACIC SURGERY (CARDIOTHORACIC VASCULAR SURGERY)

## 2023-11-15 PROCEDURE — 99999 PR PBB SHADOW E&M-EST. PATIENT-LVL IV: ICD-10-PCS | Mod: PBBFAC,HCNC,, | Performed by: THORACIC SURGERY (CARDIOTHORACIC VASCULAR SURGERY)

## 2023-11-15 PROCEDURE — 1101F PR PT FALLS ASSESS DOC 0-1 FALLS W/OUT INJ PAST YR: ICD-10-PCS | Mod: HCNC,CPTII,S$GLB, | Performed by: THORACIC SURGERY (CARDIOTHORACIC VASCULAR SURGERY)

## 2023-11-15 PROCEDURE — 3075F PR MOST RECENT SYSTOLIC BLOOD PRESS GE 130-139MM HG: ICD-10-PCS | Mod: HCNC,CPTII,S$GLB, | Performed by: THORACIC SURGERY (CARDIOTHORACIC VASCULAR SURGERY)

## 2023-11-15 PROCEDURE — 1159F PR MEDICATION LIST DOCUMENTED IN MEDICAL RECORD: ICD-10-PCS | Mod: HCNC,CPTII,S$GLB, | Performed by: THORACIC SURGERY (CARDIOTHORACIC VASCULAR SURGERY)

## 2023-11-15 PROCEDURE — 99213 OFFICE O/P EST LOW 20 MIN: CPT | Mod: HCNC,S$GLB,, | Performed by: THORACIC SURGERY (CARDIOTHORACIC VASCULAR SURGERY)

## 2023-11-15 PROCEDURE — 3288F FALL RISK ASSESSMENT DOCD: CPT | Mod: HCNC,CPTII,S$GLB, | Performed by: THORACIC SURGERY (CARDIOTHORACIC VASCULAR SURGERY)

## 2023-11-15 PROCEDURE — 3044F HG A1C LEVEL LT 7.0%: CPT | Mod: HCNC,CPTII,S$GLB, | Performed by: THORACIC SURGERY (CARDIOTHORACIC VASCULAR SURGERY)

## 2023-11-15 PROCEDURE — 1101F PT FALLS ASSESS-DOCD LE1/YR: CPT | Mod: HCNC,CPTII,S$GLB, | Performed by: THORACIC SURGERY (CARDIOTHORACIC VASCULAR SURGERY)

## 2023-11-15 PROCEDURE — 71250 CT THORAX DX C-: CPT | Mod: 26,HCNC,, | Performed by: STUDENT IN AN ORGANIZED HEALTH CARE EDUCATION/TRAINING PROGRAM

## 2023-11-15 PROCEDURE — 3008F PR BODY MASS INDEX (BMI) DOCUMENTED: ICD-10-PCS | Mod: HCNC,CPTII,S$GLB, | Performed by: THORACIC SURGERY (CARDIOTHORACIC VASCULAR SURGERY)

## 2023-11-15 PROCEDURE — 3288F PR FALLS RISK ASSESSMENT DOCUMENTED: ICD-10-PCS | Mod: HCNC,CPTII,S$GLB, | Performed by: THORACIC SURGERY (CARDIOTHORACIC VASCULAR SURGERY)

## 2023-11-15 PROCEDURE — 71250 CT THORAX DX C-: CPT | Mod: TC,HCNC

## 2023-11-15 PROCEDURE — 3075F SYST BP GE 130 - 139MM HG: CPT | Mod: HCNC,CPTII,S$GLB, | Performed by: THORACIC SURGERY (CARDIOTHORACIC VASCULAR SURGERY)

## 2023-11-15 PROCEDURE — 71250 CT CHEST WITHOUT CONTRAST: ICD-10-PCS | Mod: 26,HCNC,, | Performed by: STUDENT IN AN ORGANIZED HEALTH CARE EDUCATION/TRAINING PROGRAM

## 2023-11-15 PROCEDURE — 3044F PR MOST RECENT HEMOGLOBIN A1C LEVEL <7.0%: ICD-10-PCS | Mod: HCNC,CPTII,S$GLB, | Performed by: THORACIC SURGERY (CARDIOTHORACIC VASCULAR SURGERY)

## 2023-11-15 PROCEDURE — 99999 PR PBB SHADOW E&M-EST. PATIENT-LVL IV: CPT | Mod: PBBFAC,HCNC,, | Performed by: THORACIC SURGERY (CARDIOTHORACIC VASCULAR SURGERY)

## 2023-11-15 PROCEDURE — 3080F PR MOST RECENT DIASTOLIC BLOOD PRESSURE >= 90 MM HG: ICD-10-PCS | Mod: HCNC,CPTII,S$GLB, | Performed by: THORACIC SURGERY (CARDIOTHORACIC VASCULAR SURGERY)

## 2023-11-15 PROCEDURE — 1159F MED LIST DOCD IN RCRD: CPT | Mod: HCNC,CPTII,S$GLB, | Performed by: THORACIC SURGERY (CARDIOTHORACIC VASCULAR SURGERY)

## 2023-11-15 PROCEDURE — 99213 PR OFFICE/OUTPT VISIT, EST, LEVL III, 20-29 MIN: ICD-10-PCS | Mod: HCNC,S$GLB,, | Performed by: THORACIC SURGERY (CARDIOTHORACIC VASCULAR SURGERY)

## 2023-11-24 LAB
ALBUMIN: 4.2 G/DL (ref 3.4–5)
ALP SERPL-CCNC: 95 U/L (ref 20–120)
ALT SERPL W P-5'-P-CCNC: 13 U/L
ANION GAP SERPL CALC-SCNC: 10 MMOL/L (ref 8–16)
AST SERPL-CCNC: 15 U/L
BASOPHILS ABSOLUTE COUNT: 0 THOUSAND/UL (ref 0–0.2)
BASOPHILS NFR BLD: 0.7 %
BILIRUB SERPL-MCNC: 1.2 MG/DL
BUN BLD-MCNC: 10 MG/DL (ref 7–25)
CALCIUM SERPL-MCNC: 9.1 MG/DL (ref 8.4–10.3)
CHLORIDE SERPL-SCNC: 100 MMOL/L (ref 96–110)
CHOL/HDLC RATIO: 3.14 (ref 0–5)
CHOLEST SERPL-MSCNC: 160 MG/DL
CO2 SERPL-SCNC: 28 MMOL/L (ref 24–32)
CREAT SERPL-MCNC: 0.74 MG/DL (ref 0.7–1.4)
EGFR: 101 ML/MIN/1.73M2
EOSINOPHIL NFR BLD: 4.4 %
EOSINOPHILS ABSOLUTE COUNT: 0.2 THOUSAND/UL (ref 0–0.6)
ERYTHROCYTE [DISTWIDTH] IN BLOOD BY AUTOMATED COUNT: 12.7 % (ref 11.5–14.5)
GLUCOSE SERPL-MCNC: 95 MG/DL (ref 65–99)
HBA1C MFR BLD: 5.5 % OF TOTAL HGB
HCT VFR BLD AUTO: 40.6 % (ref 40–51)
HDLC SERPL-MCNC: 51 MG/DL (ref 40–59)
HGB BLD-MCNC: 13.8 GM/DL (ref 13.5–17.5)
LDLC SERPL CALC-MCNC: 79 MG/DL
LYMPHOCYTES %: 21.7 %
LYMPHOCYTES ABSOLUTE COUNT: 1.2 THOUSAND/UL (ref 1.1–5)
MCH RBC QN AUTO: 30.5 PG (ref 26–34)
MCHC RBC AUTO-ENTMCNC: 34.1 G/DL
MCV RBC AUTO: 89.5 FL (ref 80–100)
MONOCYTES %: 9.5 %
MONOCYTES ABSOLUTE COUNT: 0.5 THOUSAND/UL (ref 0.2–1.1)
NEUTROPHILS ABSOLUTE COUNT: 3.4 THOUSAND/UL (ref 1.8–8)
NEUTROPHILS RELATIVE PERCENT: 63.7 %
NON HDL CHOL (CALC): 109
PLATELET # BLD AUTO: 281 THOUSAND/UL (ref 130–400)
PMV BLD AUTO: 7.5 FL (ref 7.4–10.4)
POTASSIUM SERPL-SCNC: 4.4 MMOL/L (ref 3.6–5.2)
PSA FREE MFR SERPL: 9 % (CALC)
PSA FREE SERPL-MCNC: 0.6 NG/ML
PSA SERPL-MCNC: 6.4 NG/ML
RBC # BLD AUTO: 4.54 MILLION/UL (ref 4.5–5.9)
SODIUM BLD-SCNC: 138 MMOL/L (ref 135–146)
TOTAL PROTEIN: 5.9 G/DL (ref 6–8)
TRIGL SERPL-MCNC: 148 MG/DL
WBC # BLD AUTO: 5.4 THOUSAND/UL (ref 4.5–11)

## 2023-11-29 ENCOUNTER — OFFICE VISIT (OUTPATIENT)
Dept: PRIMARY CARE CLINIC | Facility: CLINIC | Age: 63
End: 2023-11-29
Payer: MEDICARE

## 2023-11-29 VITALS
HEIGHT: 70 IN | WEIGHT: 212.06 LBS | OXYGEN SATURATION: 97 % | BODY MASS INDEX: 30.36 KG/M2 | SYSTOLIC BLOOD PRESSURE: 130 MMHG | DIASTOLIC BLOOD PRESSURE: 86 MMHG | HEART RATE: 97 BPM

## 2023-11-29 DIAGNOSIS — F41.9 ANXIETY: ICD-10-CM

## 2023-11-29 DIAGNOSIS — I10 ESSENTIAL HYPERTENSION: Primary | ICD-10-CM

## 2023-11-29 DIAGNOSIS — R97.20 ELEVATED PSA: ICD-10-CM

## 2023-11-29 DIAGNOSIS — D3A.090 CARCINOID TUMOR OF RIGHT LUNG: ICD-10-CM

## 2023-11-29 DIAGNOSIS — E78.2 HYPERLIPIDEMIA, MIXED: ICD-10-CM

## 2023-11-29 PROCEDURE — 3008F BODY MASS INDEX DOCD: CPT | Mod: CPTII,S$GLB,, | Performed by: INTERNAL MEDICINE

## 2023-11-29 PROCEDURE — 1159F PR MEDICATION LIST DOCUMENTED IN MEDICAL RECORD: ICD-10-PCS | Mod: CPTII,S$GLB,, | Performed by: INTERNAL MEDICINE

## 2023-11-29 PROCEDURE — 1159F MED LIST DOCD IN RCRD: CPT | Mod: CPTII,S$GLB,, | Performed by: INTERNAL MEDICINE

## 2023-11-29 PROCEDURE — 99214 OFFICE O/P EST MOD 30 MIN: CPT | Mod: S$GLB,,, | Performed by: INTERNAL MEDICINE

## 2023-11-29 PROCEDURE — 99214 PR OFFICE/OUTPT VISIT, EST, LEVL IV, 30-39 MIN: ICD-10-PCS | Mod: S$GLB,,, | Performed by: INTERNAL MEDICINE

## 2023-11-29 PROCEDURE — 3075F PR MOST RECENT SYSTOLIC BLOOD PRESS GE 130-139MM HG: ICD-10-PCS | Mod: CPTII,S$GLB,, | Performed by: INTERNAL MEDICINE

## 2023-11-29 PROCEDURE — 3044F PR MOST RECENT HEMOGLOBIN A1C LEVEL <7.0%: ICD-10-PCS | Mod: CPTII,S$GLB,, | Performed by: INTERNAL MEDICINE

## 2023-11-29 PROCEDURE — 3079F PR MOST RECENT DIASTOLIC BLOOD PRESSURE 80-89 MM HG: ICD-10-PCS | Mod: CPTII,S$GLB,, | Performed by: INTERNAL MEDICINE

## 2023-11-29 PROCEDURE — 3008F PR BODY MASS INDEX (BMI) DOCUMENTED: ICD-10-PCS | Mod: CPTII,S$GLB,, | Performed by: INTERNAL MEDICINE

## 2023-11-29 PROCEDURE — 99999 PR PBB SHADOW E&M-EST. PATIENT-LVL III: ICD-10-PCS | Mod: PBBFAC,,, | Performed by: INTERNAL MEDICINE

## 2023-11-29 PROCEDURE — 3044F HG A1C LEVEL LT 7.0%: CPT | Mod: CPTII,S$GLB,, | Performed by: INTERNAL MEDICINE

## 2023-11-29 PROCEDURE — 99999 PR PBB SHADOW E&M-EST. PATIENT-LVL III: CPT | Mod: PBBFAC,,, | Performed by: INTERNAL MEDICINE

## 2023-11-29 PROCEDURE — 3079F DIAST BP 80-89 MM HG: CPT | Mod: CPTII,S$GLB,, | Performed by: INTERNAL MEDICINE

## 2023-11-29 PROCEDURE — 3075F SYST BP GE 130 - 139MM HG: CPT | Mod: CPTII,S$GLB,, | Performed by: INTERNAL MEDICINE

## 2023-11-29 RX ORDER — LORAZEPAM 1 MG/1
1 TABLET ORAL DAILY PRN
Qty: 30 TABLET | Refills: 3 | Status: SHIPPED | OUTPATIENT
Start: 2023-11-29

## 2023-11-29 NOTE — PROGRESS NOTES
[facility] Note    Chief Complaint      Chief Complaint   Patient presents with    Follow-up     6 month        History of Present Illness      Justus Shaikh is a 63 y.o. male with chronic conditions of HTN, HLD, carcinoid of right lower lobe lung, Gilbert disease, BPH  who presents today for: follow up chronic conditions.  Since last visit, had uncomplicated right TKA with Dr. Rojo.   Typical carcinoid tumor of RLL: Seeing Dr. Saeed.  S/p right lobectomy.  Cough has mostly resolved, reduced albuterol usage.  Right anterior abdominal bulge considered expected sequela of CT surgery.  Has seen slight decrease in exertional tolerance/stamina but is mild/manageable.   HTN: BP at goal on irbesartan-hctz.  Sees Dr. Leija as needed.  HLD: Controlled on crestor.  LDL 92 last check.  On every other day to avoid joint pain.  BPH, elevated PSA: Sees Dr. Flores.  On cialis daily. Had recent MRI which did not show any cancer. Last PSA 6.4   Gilbert disease: LFTs stable.  Anxiety: Controlled on lorazepam as needed.    Alopecia: Sees dermatology.  On minoxidil currently.  Flu shot UTD.  Td within 10 yrs.  Plan to revaccinate in 2025.  Shingrix discussed.  Pneumonia vaccine due age 63.  COVID UTD.  Cscope Dr. Solano, 4-5 yrs ago, no polyps, 10 yr interval.      Past Medical History:  History reviewed. No pertinent past medical history.    Past Surgical History:   has a past surgical history that includes Rotator cuff repair (Right); Hand surgery (Right); Knee surgery; robotic bronchoscopy (N/A, 05/06/2022); Hernia repair (1961); Tonsillectomy (1963); Vasectomy (1998); xi robotic rats,with lobectomy,lung (Right, 6/27/2022); and Injection of anesthetic agent around multiple intercostal nerves (Right, 6/27/2022).    Family History:  family history includes Arthritis in his father and sister; Asthma in his son; Cancer in his brother; Diabetes in his father; Drug abuse in his brother; Heart disease in his father  and mother; Hypertension in his mother; Learning disabilities in his brother and son.     Social History:  Social History     Tobacco Use    Smoking status: Light Smoker     Types: Cigars    Smokeless tobacco: Never   Substance Use Topics    Alcohol use: Yes     Alcohol/week: 10.0 standard drinks of alcohol     Types: 10 Glasses of wine per week    Drug use: Never       I personally reviewed all past medical, surgical, social and family history.    Review of Systems   Constitutional:  Negative for chills, fever and malaise/fatigue.   Respiratory:  Negative for shortness of breath.    Cardiovascular:  Negative for chest pain.   Gastrointestinal:  Negative for constipation, diarrhea, nausea and vomiting.   Skin:  Negative for rash.   Neurological:  Negative for weakness.   All other systems reviewed and are negative.       Medications:  Outpatient Encounter Medications as of 11/29/2023   Medication Sig Note Dispense Refill    albuterol (PROVENTIL) 2.5 mg /3 mL (0.083 %) nebulizer solution Take 3 mLs (2.5 mg total) by nebulization 3 (three) times daily as needed for Wheezing or Shortness of Breath.  180 each 2    aspirin (ECOTRIN) 81 MG EC tablet Take 81 mg by mouth once daily.       COLLAGEN MISC by Misc.(Non-Drug; Combo Route) route.       esomeprazole magnesium (NEXIUM 24HR ORAL) Take by mouth.       folic acid/multivit-min/lutein (CENTRUM SILVER ORAL) Take by mouth.       irbesartan-hydrochlorothiazide (AVALIDE) 300-12.5 mg per tablet Take 1 tablet by mouth once daily.  90 tablet 3    LORazepam (ATIVAN) 1 MG tablet Take 1 tablet (1 mg total) by mouth daily as needed for Anxiety.  30 tablet 3    minoxidiL (LONITEN) 2.5 MG tablet Take 5 mg by mouth nightly.       omega 3-dha-epa-fish oil 1,200 (144-216) mg Cap Take by mouth.       rosuvastatin (CRESTOR) 20 MG tablet Take 1 tablet (20 mg total) by mouth every other day.  90 tablet 3    tadalafiL (CIALIS) 5 MG tablet Take 5 mg by mouth daily as needed for Erectile  "Dysfunction. 6/24/2022: Hold am of surgery       VIOS AEROSOL DELIVERY SYSTEM Georgette Take by nebulization as needed.       [DISCONTINUED] hydroCHLOROthiazide (MICROZIDE) 12.5 mg capsule hydrochlorothiazide Take No date recorded No form recorded No frequency recorded No route recorded No set duration recorded No set duration amount recorded active No dosage strength recorded No dosage strength units of measure recorded       [DISCONTINUED] LORazepam (ATIVAN) 1 MG tablet Take 1 tablet (1 mg total) by mouth daily as needed for Anxiety.  30 tablet 3     No facility-administered encounter medications on file as of 11/29/2023.       Allergies:  Review of patient's allergies indicates:   Allergen Reactions    Sudafed cold-allergy Palpitations       Health Maintenance:  Immunization History   Administered Date(s) Administered    COVID-19, MRNA, LN-S, PF (MODERNA FULL 0.5 ML DOSE) 02/25/2021, 03/23/2021    Influenza (FLUAD) - Quadrivalent - Adjuvanted - PF *Preferred* (65+) 09/19/2023    Influenza - Quadrivalent - MDCK - PF 10/23/2019, 09/16/2020, 11/21/2022    Influenza - Quadrivalent - PF *Preferred* (6 months and older) 12/08/2021    Zoster Recombinant 03/19/2023      Health Maintenance   Topic Date Due    TETANUS VACCINE  Never done    Colorectal Cancer Screening  Never done    Shingles Vaccine (2 of 2) 05/14/2023    High Dose Statin  11/15/2024    Lipid Panel  11/21/2028    Hepatitis C Screening  Completed    Abdominal Aortic Aneurysm Screening  Completed        Physical Exam      Vital Signs  Pulse: 97  SpO2: 97 %  BP: 130/86  BP Location: Left arm  Patient Position: Sitting  Pain Score: 0-No pain  Height and Weight  Height: 5' 10" (177.8 cm)  Weight: 96.2 kg (212 lb 1.3 oz)  BSA (Calculated - sq m): 2.18 sq meters  BMI (Calculated): 30.4  Weight in (lb) to have BMI = 25: 173.9]    Physical Exam  Vitals reviewed.   Constitutional:       Appearance: He is well-developed.   HENT:      Head: Normocephalic and atraumatic.    "   Right Ear: External ear normal.      Left Ear: External ear normal.   Cardiovascular:      Rate and Rhythm: Normal rate and regular rhythm.      Heart sounds: Normal heart sounds. No murmur heard.  Pulmonary:      Effort: Pulmonary effort is normal.      Breath sounds: Normal breath sounds. No wheezing or rales.   Abdominal:      General: Bowel sounds are normal.      Palpations: Abdomen is soft.          Laboratory:  CBC:  Recent Labs   Lab 07/19/22  1058 09/26/23  1022 11/21/23  0811   WBC 6.45 4.99 5.4   RBC 5.04 4.72 4.54   Hemoglobin 15.3 14.5 13.8   Hematocrit 44.6 39.8 L 40.6   Platelets 337 248 281   MCV 89 84 89.5   MCH 30.4 30.7 30.5   MCHC 34.3 36.4 H 34.1     CMP:  Recent Labs   Lab 06/16/23  0746 09/26/23  1022 11/21/23  0811   Glucose 102 H 93 95   Calcium 9.4 9.5 9.1   Albumin  --  4.3  --    ALBUMIN 5.0  --  4.2   Total Protein 6.9 7.2 5.9 L   Sodium 134 L 136 138   Potassium 4.7 3.8 4.4   CO2 26 28 28   Chloride 97 L 99 100   BUN 17.0 13 10.0   Alkaline Phosphatase 95 78 95   ALT 17 21 13   AST 21 23 15   Total Bilirubin 1.2 2.0 H 1.2     URINALYSIS:  Recent Labs   Lab 09/26/23  1148   Color, UA Yellow   Specific Gravity, UA 1.010   pH, UA 7.0   Protein, UA Negative   Nitrite, UA Negative   Leukocytes, UA Negative      LIPIDS:  Recent Labs   Lab 12/04/20  0744 06/04/21  0828 12/06/21  0956 06/03/22  0843 12/08/22  0815 06/16/23  0746 11/21/23  0811   TSH 3.58 2.44  --   --   --   --   --    HDL 52 62 70   < > 53 64 51   Cholesterol 153 156 154   < > 171 172 160   Triglycerides 128 123 87   < > 163 H 133 148   LDL Calculated 81 74 70   < > 92 87 79   Non-HDL Cholesterol 101 94 84  --   --   --   --     < > = values in this interval not displayed.     TSH:  Recent Labs   Lab 12/04/20  0744 06/04/21  0828   TSH 3.58 2.44     A1C:  Recent Labs   Lab 12/10/21  1009 06/03/22  0843 12/08/22  0815 06/16/23  0746 09/26/23  1022 11/21/23  0811   Hemoglobin A1C 5.5 5.3 5.4 5.5 5.2 5.5       Assessment/Plan      Justus Shaikh is a 63 y.o.male with:    1. Essential hypertension  Continue current meds.    2. Hyperlipidemia, mixed  Continue current meds.    3. Anxiety  - LORazepam (ATIVAN) 1 MG tablet; Take 1 tablet (1 mg total) by mouth daily as needed for Anxiety.  Dispense: 30 tablet; Refill: 3  Continue current meds.      Chronic conditions status updated as per HPI.  Other than changes above, cont current medications and maintain follow up with specialists.  No follow-ups on file.    Future Appointments   Date Time Provider Department Center   5/15/2024  9:30 AM [facility] CT1 [facility] LYNETTE Perera   5/15/2024 10:00 AM Osvaldo Saeed MD [facility] Sam Worthington MD  [facility]

## 2023-11-30 ENCOUNTER — PATIENT OUTREACH (OUTPATIENT)
Dept: ADMINISTRATIVE | Facility: HOSPITAL | Age: 63
End: 2023-11-30
Payer: MEDICARE

## 2023-11-30 NOTE — LETTER
AUTHORIZATION FOR RELEASE OF   CONFIDENTIAL INFORMATION    Dear Dr. Isidro Solano,    We are seeing Justus Shaikh, date of birth 1960, in the clinic at [facility]. Isidro Worthington MD is the patient's PCP. Justus Shaikh has an outstanding lab/procedure at the time we reviewed his chart. In order to help keep his health information updated, he has authorized us to request the following medical record(s):        (  )  MAMMOGRAM                                      ( X )  COLONOSCOPY      (  )  PAP SMEAR                                          (  )  OUTSIDE LAB RESULTS     (  )  DEXA SCAN                                          (  )  EYE EXAM            (  )  FOOT EXAM                                          (  )  ENTIRE RECORD     (  )  OUTSIDE IMMUNIZATIONS                 (  )  _______________         Please fax records to Isdiro Worthington MD, [phone]     If you have any questions, please contact LESLY Parry at [phone].           Patient Name: Justus Shaikh  : 1960  Patient Phone #: [phone]

## 2023-11-30 NOTE — PROGRESS NOTES
Health Maintenance Due   Topic Date Due    Pneumococcal Vaccines (Age 65+) (1 - PCV) Never done    TETANUS VACCINE  Never done    Colorectal Cancer Screening  Never done    RSV Vaccine (Age 60+ and Pregnant patients) (1 - 1-dose 60+ series) Never done    Shingles Vaccine (2 of 2) 2023    COVID-19 Vaccine (3 - -24 season) 2023     Population Health Chart Review & Patient Outreach Details    Updates Requested / Reviewed:     [x]  Care Everywhere    []     []  External Sources ([facility], [facility], DIS, etc.)    [] [facility]   [] [facility]   [] Other:    [x]  Care Team Updated   []  Removed  or Duplicate Orders   [x]  Immunization Reconciliation Completed / Queried    [x] [place]   [] [place]   [] [place]   [] [place]      Health Maintenance Topics Addressed and Outreach Outcomes / Actions Taken:             Breast Cancer Screening []  Mammogram Order Placed    []  Mammogram Screening Scheduled    []  External Records Requested & Care Team Updated if Applicable    []  External Records Uploaded & Care Team Updated if Applicable    []  Pt Declined Scheduling Mammogram    []  Pt Will Schedule with External Provider / Order Routed & Care Team Updated if Applicable              Cervical Cancer Screening []  Pap Smear Scheduled in Primary Care or OBGYN    []  External Records Requested & Care Team Updated if Applicable       []  External Records Uploaded, Care Team Updated, & History Updated if Applicable    []  Patient Declined Scheduling Pap Smear    []  Patient Will Schedule with External Provider & Care Team Updated if Applicable                  Colorectal Cancer Screening []  Colonoscopy Case Request / Referral / Home Test Order Placed    [x]  External Records Requested & Care Team Updated if Applicable    []  External Records Uploaded, Care Team Updated, & History Updated if Applicable    []  Patient Declined Completing Colon Cancer Screening    []  Patient Will Schedule with External  Provider & Care Team Updated if Applicable    []  Fit Kit Mailed (add the SmartPhrase under additional notes)    []  Reminded Patient to Complete Home Test                Diabetic Eye Exam []  Eye Exam Screening Order Placed    []  Eye Camera Scheduled or Optometry/Ophthalmology Referral Placed    []  External Records Requested & Care Team Updated if Applicable    []  External Records Uploaded, Care Team Updated, & History Updated if Applicable    []  Patient Declined Scheduling Eye Exam    []  Patient Will Schedule with External Provider & Care Team Updated if Applicable             Blood Pressure Control []  Primary Care Follow Up Visit Scheduled     []  Remote Blood Pressure Reading Captured    []  Patient Declined Remote Reading or Scheduling Appt - Escalated to PCP    []  Patient Will Call Back or Send Portal Message with Reading                 HbA1c & Other Labs []  Overdue Lab(s) Ordered    []  Overdue Lab(s) Scheduled    []  External Records Uploaded & Care Team Updated if Applicable    []  Primary Care Follow Up Visit Scheduled     []  Reminded Patient to Complete A1c Home Test    []  Patient Declined Scheduling Labs or Will Call Back to Schedule    []  Patient Will Schedule with External Provider / Order Routed, & Care Team Updated if Applicable           Primary Care Appointment []  Primary Care Appt Scheduled    []  Patient Declined Scheduling or Will Call Back to Schedule    []  Pt Established with External Provider, Updated Care Team, & Informed Pt to Notify Payor if Applicable           Medication Adherence /    Statin Use []  Primary Care Appointment Scheduled    []  Patient Reminded to  Prescription    []  Patient Declined, Provider Notified if Needed    []  Sent Provider Message to Review to Evaluate Pt for Statin, Add Exclusion Dx Codes, Document   Exclusion in Problem List, Change Statin Intensity Level to Moderate or High Intensity if Applicable                Osteoporosis Screening []   Dexa Order Placed    []  Dexa Appointment Scheduled    []  External Records Requested & Care Team Updated    []  External Records Uploaded, Care Team Updated, & History Updated if Applicable    []  Patient Declined Scheduling Dexa or Will Call Back to Schedule    []  Patient Will Schedule with External Provider / Order Routed & Care Team Updated if Applicable       Additional Notes:    Record request sent to Dr. Isidro Solano. Chart review completed.

## 2024-01-14 DIAGNOSIS — I10 ESSENTIAL HYPERTENSION: ICD-10-CM

## 2024-01-16 RX ORDER — IRBESARTAN AND HYDROCHLOROTHIAZIDE 300; 12.5 MG/1; MG/1
1 TABLET, FILM COATED ORAL
Qty: 90 TABLET | Refills: 3 | Status: SHIPPED | OUTPATIENT
Start: 2024-01-16

## 2024-01-16 NOTE — TELEPHONE ENCOUNTER
No care due was identified.  Health [facility] Embedded Care Due Messages. Reference number: [id number].   1/16/2024 3:01:16 AM CST

## 2024-01-16 NOTE — TELEPHONE ENCOUNTER
Refill Decision Note   Justus Shaikh  is requesting a refill authorization.  Brief Assessment and Rationale for Refill:  Approve     Medication Therapy Plan:         Comments:     Note composed:3:01 AM 01/16/2024

## 2024-02-06 ENCOUNTER — TELEPHONE (OUTPATIENT)
Dept: PRIMARY CARE CLINIC | Facility: CLINIC | Age: 64
End: 2024-02-06
Payer: MEDICARE

## 2024-02-06 ENCOUNTER — PATIENT MESSAGE (OUTPATIENT)
Dept: PRIMARY CARE CLINIC | Facility: CLINIC | Age: 64
End: 2024-02-06
Payer: MEDICARE

## 2024-02-06 DIAGNOSIS — R97.20 ELEVATED PSA: Primary | ICD-10-CM

## 2024-02-06 DIAGNOSIS — C61 PROSTATE CANCER: ICD-10-CM

## 2024-02-06 DIAGNOSIS — Z12.11 COLON CANCER SCREENING: ICD-10-CM

## 2024-02-06 NOTE — TELEPHONE ENCOUNTER
Ref#[id number]  Fax#[phone]    Spoke to [facility] faxed all requested information, stated to allow 14 business days for an answer.

## 2024-02-06 NOTE — TELEPHONE ENCOUNTER
----- Message from Nadine Da Silva sent at 2/6/2024  8:07 AM CST -----  Contact: Justus [phone]  Would like to receive medical advice.    Would they like a call back or a response via [facility]:  call back    Additional information:  Justus is calling to make sure the provider and nurse can click on the link he sent on the portal message, if not Justus would like to know if there is another way he can send the link to the provider. Please call Justus back for advice.

## 2024-02-07 ENCOUNTER — TELEPHONE (OUTPATIENT)
Dept: PRIMARY CARE CLINIC | Facility: CLINIC | Age: 64
End: 2024-02-07
Payer: MEDICARE

## 2024-02-07 NOTE — TELEPHONE ENCOUNTER
----- Message from Darlene Timmons sent at 2/7/2024  9:10 AM CST -----  Contact: Daphne Chandler [phone]  1MEDICALADVICE     Patient is calling for Medical Advice regarding: needs to know if pt is a cancer pt     How long has patient had these symptoms:    Pharmacy name and phone#:    Would like response via [facility]:  n/a     Comments:    Please call Geraldine with Daphne [phone] back

## 2024-02-07 NOTE — TELEPHONE ENCOUNTER
----- Message from Renea Iraheta sent at 2/7/2024 11:32 AM CST -----  Contact: Humanalvaro   Humanalvaro calling to give PA # [phone] for this Patient. This is for an office visit. Please call if any other info is needed

## 2024-03-29 DIAGNOSIS — E78.2 HYPERLIPIDEMIA, MIXED: ICD-10-CM

## 2024-03-29 NOTE — TELEPHONE ENCOUNTER
No care due was identified.  [facility] Embedded Care Due Messages. Reference number: [id number].   3/29/2024 7:01:59 AM CDT

## 2024-03-30 RX ORDER — ROSUVASTATIN CALCIUM 20 MG/1
20 TABLET, COATED ORAL EVERY OTHER DAY
Qty: 45 TABLET | Refills: 2 | Status: SHIPPED | OUTPATIENT
Start: 2024-03-30

## 2024-03-31 NOTE — TELEPHONE ENCOUNTER
Refill Decision Note   Justus Shaikh  is requesting a refill authorization.  Brief Assessment and Rationale for Refill:  Approve     Medication Therapy Plan:        Comments:     Note composed:10:17 PM 03/30/2024

## 2024-04-24 ENCOUNTER — PATIENT MESSAGE (OUTPATIENT)
Dept: PRIMARY CARE CLINIC | Facility: CLINIC | Age: 64
End: 2024-04-24
Payer: MEDICARE

## 2024-04-24 DIAGNOSIS — I10 ESSENTIAL HYPERTENSION: Primary | ICD-10-CM

## 2024-04-25 RX ORDER — HYDROCHLOROTHIAZIDE 12.5 MG/1
12.5 CAPSULE ORAL DAILY PRN
Qty: 30 CAPSULE | Refills: 3 | Status: SHIPPED | OUTPATIENT
Start: 2024-04-25

## 2024-05-14 NOTE — PROGRESS NOTES
"Subjective:       Patient ID: Justus Shaikh is a 64 y.o. male.    Chief Complaint: Follow-up      Diagnosis:  Typical carcinoid     Pre-operative therapy: none     Procedure(s) and date(s): 6/28/22: Right robotic assisted lower lobectomy with MLND    Pathology: 2.6 cm typical carcinoid. Levels 2,4,7,11,12 = negative. pT1cN0     Post-operative therapy: surveillance     62 year old male with HTN, HLD, Gilbert's Disease, and BPH who presents to clinic for 2 year follow up s/p right robotic assisted lower lobectomy with MLND for typical carcinoid of right lower lobe. Uncomplicated post-operative course. Here today with updated chest CT. Today patient reports he is doing well. Reports new diagnosis of prostate cancer s/p prostatectomy 03/2024 at [facility] as well as recent R TKR. Otherwise doing well.     Review of Systems   Constitutional:  Negative for activity change and fatigue.   Respiratory:  Positive for cough. Negative for shortness of breath.         NP cough when lying on right side   Cardiovascular:  Negative for chest pain.   Gastrointestinal: Negative.    Genitourinary: Negative.    Musculoskeletal: Negative.    Neurological: Negative.    Psychiatric/Behavioral: Negative.           Objective:       Vitals:    05/15/24 0933   BP: 133/84   Pulse: 90   SpO2: 95%   Weight: 96.1 kg (211 lb 13.8 oz)   Height: 5' 10" (1.778 m)   PainSc: 0-No pain             Physical Exam  Vitals reviewed.   Constitutional:       Appearance: Normal appearance.   HENT:      Head: Atraumatic.   Eyes:      Extraocular Movements: Extraocular movements intact.   Cardiovascular:      Rate and Rhythm: Normal rate and regular rhythm.      Pulses: Normal pulses.      Heart sounds: Normal heart sounds.   Pulmonary:      Effort: Pulmonary effort is normal.      Breath sounds: Normal breath sounds.      Comments: Right robotic incisions well healed.   Abdominal:      General: Abdomen is flat.      Palpations: Abdomen is soft. "   Musculoskeletal:         General: Normal range of motion.      Cervical back: Normal range of motion and neck supple.   Skin:     General: Skin is warm and dry.      Capillary Refill: Capillary refill takes less than 2 seconds.   Neurological:      General: No focal deficit present.      Mental Status: He is alert and oriented to person, place, and time.   Psychiatric:         Mood and Affect: Mood normal.         Behavior: Behavior normal.         CXR 7/15/22: expected post op film. Small volume loss. No significant pleural fluid     Chest CT 05/17/23: images personally reviewed  Postoperative change of right lower lobectomy without specific evidence of recurrent or new metastatic disease in the chest.    Chest CT 11/15/23: images reviewed   Stable postoperative changes consistent with right lower lobectomy  No new pulmonary nodules or mediastinal adenopathy    Chest CT 05/15/24: I reviewed the images  ARIAN. Stable post-operative changes.   Stable focal cavitary lesion in anterior aspect of RML    Assessment:       64 y.o. male with HTN, HLD, Gilbert's Disease, and BPH who presents for follow up s/p right robotic assisted lower lobectomy with MLND for typical carcinoid of right lower lobe for pT1c RLL typical carcinoid. Node negative.   Focal cavitary lesion is likely scar as it is adjacent to a know robotic port site  ARIAN      Plan:       RTC in 6 months with chest CT.

## 2024-05-15 ENCOUNTER — OFFICE VISIT (OUTPATIENT)
Dept: CARDIOTHORACIC SURGERY | Facility: CLINIC | Age: 64
End: 2024-05-15
Attending: THORACIC SURGERY (CARDIOTHORACIC VASCULAR SURGERY)
Payer: MEDICARE

## 2024-05-15 ENCOUNTER — HOSPITAL ENCOUNTER (OUTPATIENT)
Dept: RADIOLOGY | Facility: HOSPITAL | Age: 64
Discharge: HOME | End: 2024-05-15
Attending: THORACIC SURGERY (CARDIOTHORACIC VASCULAR SURGERY)
Payer: MEDICARE

## 2024-05-15 VITALS
OXYGEN SATURATION: 95 % | HEIGHT: 70 IN | SYSTOLIC BLOOD PRESSURE: 133 MMHG | WEIGHT: 211.88 LBS | BODY MASS INDEX: 30.33 KG/M2 | HEART RATE: 90 BPM | DIASTOLIC BLOOD PRESSURE: 84 MMHG

## 2024-05-15 DIAGNOSIS — Z85.118 HISTORY OF LUNG CANCER: ICD-10-CM

## 2024-05-15 DIAGNOSIS — Z85.118 HISTORY OF LUNG CANCER: Primary | ICD-10-CM

## 2024-05-15 PROCEDURE — 3075F SYST BP GE 130 - 139MM HG: CPT | Mod: CPTII,S$GLB,, | Performed by: THORACIC SURGERY (CARDIOTHORACIC VASCULAR SURGERY)

## 2024-05-15 PROCEDURE — 1159F MED LIST DOCD IN RCRD: CPT | Mod: CPTII,S$GLB,, | Performed by: THORACIC SURGERY (CARDIOTHORACIC VASCULAR SURGERY)

## 2024-05-15 PROCEDURE — 99999 PR PBB SHADOW E&M-EST. PATIENT-LVL IV: CPT | Mod: PBBFAC,,, | Performed by: THORACIC SURGERY (CARDIOTHORACIC VASCULAR SURGERY)

## 2024-05-15 PROCEDURE — 3079F DIAST BP 80-89 MM HG: CPT | Mod: CPTII,S$GLB,, | Performed by: THORACIC SURGERY (CARDIOTHORACIC VASCULAR SURGERY)

## 2024-05-15 PROCEDURE — 71250 CT THORAX DX C-: CPT | Mod: 26,,, | Performed by: RADIOLOGY

## 2024-05-15 PROCEDURE — 71250 CT THORAX DX C-: CPT | Mod: TC

## 2024-05-15 PROCEDURE — 1101F PT FALLS ASSESS-DOCD LE1/YR: CPT | Mod: CPTII,S$GLB,, | Performed by: THORACIC SURGERY (CARDIOTHORACIC VASCULAR SURGERY)

## 2024-05-15 PROCEDURE — 99213 OFFICE O/P EST LOW 20 MIN: CPT | Mod: S$GLB,,, | Performed by: THORACIC SURGERY (CARDIOTHORACIC VASCULAR SURGERY)

## 2024-05-15 PROCEDURE — 3288F FALL RISK ASSESSMENT DOCD: CPT | Mod: CPTII,S$GLB,, | Performed by: THORACIC SURGERY (CARDIOTHORACIC VASCULAR SURGERY)

## 2024-05-15 PROCEDURE — 3008F BODY MASS INDEX DOCD: CPT | Mod: CPTII,S$GLB,, | Performed by: THORACIC SURGERY (CARDIOTHORACIC VASCULAR SURGERY)

## 2024-05-15 PROCEDURE — 1126F AMNT PAIN NOTED NONE PRSNT: CPT | Mod: CPTII,S$GLB,, | Performed by: THORACIC SURGERY (CARDIOTHORACIC VASCULAR SURGERY)

## 2024-07-10 DIAGNOSIS — F41.9 ANXIETY: ICD-10-CM

## 2024-07-10 RX ORDER — LORAZEPAM 1 MG/1
TABLET ORAL
Qty: 30 TABLET | Refills: 2 | Status: SHIPPED | OUTPATIENT
Start: 2024-07-10

## 2024-07-10 NOTE — TELEPHONE ENCOUNTER
No care due was identified.  Health [facility] Embedded Care Due Messages. Reference number: [id number].   7/10/2024 4:34:42 PM CDT

## 2024-10-28 ENCOUNTER — TELEPHONE (OUTPATIENT)
Dept: PRIMARY CARE CLINIC | Facility: CLINIC | Age: 64
End: 2024-10-28
Payer: MEDICARE

## 2024-10-28 DIAGNOSIS — I10 ESSENTIAL HYPERTENSION: Primary | ICD-10-CM

## 2024-10-28 DIAGNOSIS — E78.2 HYPERLIPIDEMIA, MIXED: ICD-10-CM

## 2024-10-28 DIAGNOSIS — R73.01 IMPAIRED FASTING GLUCOSE: ICD-10-CM

## 2024-10-29 ENCOUNTER — PATIENT MESSAGE (OUTPATIENT)
Dept: CARDIOTHORACIC SURGERY | Facility: CLINIC | Age: 64
End: 2024-10-29
Payer: MEDICARE

## 2024-10-31 LAB — CRC RECOMMENDATION EXT: NORMAL

## 2024-11-01 ENCOUNTER — PATIENT OUTREACH (OUTPATIENT)
Dept: ADMINISTRATIVE | Facility: HOSPITAL | Age: 64
End: 2024-11-01
Payer: MEDICARE

## 2024-11-08 NOTE — PROGRESS NOTES
Subjective:       Patient ID: Justus Shaikh is a 64 y.o. male.    Chief Complaint: No chief complaint on file.      Diagnosis:  Typical carcinoid     Pre-operative therapy: none     Procedure(s) and date(s): 6/28/22: Right robotic assisted lower lobectomy with MLND    Pathology: 2.6 cm typical carcinoid. Levels 2,4,7,11,12 = negative. pT1cN0     Post-operative therapy: surveillance     62 year old male with HTN, HLD, Gilbert's Disease, and BPH who presents to clinic for 2 year follow up s/p right robotic assisted lower lobectomy with MLND for typical carcinoid of right lower lobe. Uncomplicated post-operative course. Here today with updated chest CT. Today patient reports he is doing well. Reports new diagnosis of prostate cancer s/p prostatectomy 03/2024 at [facility] as well as recent R TKR. Otherwise doing well.     Review of Systems   Constitutional:  Negative for activity change and fatigue.   HENT:  Positive for postnasal drip and rhinorrhea.    Respiratory:  Positive for cough. Negative for shortness of breath.         NP cough when lying on right side   Cardiovascular:  Negative for chest pain.   Gastrointestinal: Negative.    Genitourinary: Negative.    Musculoskeletal: Negative.    Neurological: Negative.    Psychiatric/Behavioral: Negative.           Objective:       There were no vitals filed for this visit.            Physical Exam  Vitals reviewed.   Constitutional:       Appearance: Normal appearance.   HENT:      Head: Atraumatic.   Eyes:      Extraocular Movements: Extraocular movements intact.   Cardiovascular:      Rate and Rhythm: Normal rate and regular rhythm.      Pulses: Normal pulses.      Heart sounds: Normal heart sounds.   Pulmonary:      Effort: Pulmonary effort is normal.      Breath sounds: Normal breath sounds.      Comments: Right robotic incisions well healed.   Abdominal:      General: Abdomen is flat.      Palpations: Abdomen is soft.   Musculoskeletal:         General: Normal  range of motion.      Cervical back: Normal range of motion and neck supple.   Skin:     General: Skin is warm and dry.      Capillary Refill: Capillary refill takes less than 2 seconds.   Neurological:      General: No focal deficit present.      Mental Status: He is alert and oriented to person, place, and time.   Psychiatric:         Mood and Affect: Mood normal.         Behavior: Behavior normal.         CXR 7/15/22: expected post op film. Small volume loss. No significant pleural fluid     Chest CT 05/17/23: images personally reviewed  Postoperative change of right lower lobectomy without specific evidence of recurrent or new metastatic disease in the chest.    Chest CT 11/15/23: images reviewed   Stable postoperative changes consistent with right lower lobectomy  No new pulmonary nodules or mediastinal adenopathy    Chest CT 05/15/24: I reviewed the images  ARIAN. Stable post-operative changes.   Stable focal cavitary lesion in anterior aspect of RML    Chest CT 11/13/24: I reviewed the images  Increased size of prior focal cavitary lesion in the anterior right middle lobe.  There is a progressive solid component    Assessment:       64 y.o. male with HTN, HLD, Gilbert's Disease, and BPH who presents for follow up s/p right robotic assisted lower lobectomy with MLND for typical carcinoid of right lower lobe for pT1c RLL typical carcinoid. Node negative.   Progression of RML nodule suggests infectious versus inflammatory versus malignant etiology  Recurrent URI symptoms suggest an infectious or inflammatory process    Plan:       PET scan  Referral to pulm for biopsy via robotic bronch

## 2024-11-13 ENCOUNTER — OFFICE VISIT (OUTPATIENT)
Dept: CARDIOTHORACIC SURGERY | Facility: CLINIC | Age: 64
End: 2024-11-13
Attending: THORACIC SURGERY (CARDIOTHORACIC VASCULAR SURGERY)
Payer: MEDICARE

## 2024-11-13 ENCOUNTER — HOSPITAL ENCOUNTER (OUTPATIENT)
Dept: RADIOLOGY | Facility: HOSPITAL | Age: 64
Discharge: HOME | End: 2024-11-13
Attending: THORACIC SURGERY (CARDIOTHORACIC VASCULAR SURGERY)
Payer: MEDICARE

## 2024-11-13 VITALS
BODY MASS INDEX: 30.96 KG/M2 | HEART RATE: 82 BPM | WEIGHT: 216.25 LBS | SYSTOLIC BLOOD PRESSURE: 154 MMHG | HEIGHT: 70 IN | OXYGEN SATURATION: 96 % | DIASTOLIC BLOOD PRESSURE: 95 MMHG

## 2024-11-13 DIAGNOSIS — R91.1 PULMONARY NODULE, RIGHT: ICD-10-CM

## 2024-11-13 DIAGNOSIS — Z85.118 HISTORY OF LUNG CANCER: ICD-10-CM

## 2024-11-13 DIAGNOSIS — Z85.118 HISTORY OF LUNG CANCER: Primary | ICD-10-CM

## 2024-11-13 PROCEDURE — 99999 PR PBB SHADOW E&M-EST. PATIENT-LVL III: CPT | Mod: PBBFAC,,, | Performed by: THORACIC SURGERY (CARDIOTHORACIC VASCULAR SURGERY)

## 2024-11-13 PROCEDURE — 71250 CT THORAX DX C-: CPT | Mod: TC

## 2024-11-19 ENCOUNTER — LAB VISIT (OUTPATIENT)
Dept: LAB | Facility: HOSPITAL | Age: 64
End: 2024-11-19
Attending: NURSE PRACTITIONER
Payer: MEDICARE

## 2024-11-19 DIAGNOSIS — I10 ESSENTIAL HYPERTENSION: ICD-10-CM

## 2024-11-19 DIAGNOSIS — E78.2 HYPERLIPIDEMIA, MIXED: ICD-10-CM

## 2024-11-19 DIAGNOSIS — R73.01 IMPAIRED FASTING GLUCOSE: ICD-10-CM

## 2024-11-19 LAB
ALBUMIN SERPL BCP-MCNC: 4.2 G/DL (ref 3.5–5.2)
ALP SERPL-CCNC: 84 U/L (ref 40–150)
ALT SERPL W/O P-5'-P-CCNC: 22 U/L (ref 10–44)
ANION GAP SERPL CALC-SCNC: 7 MMOL/L (ref 8–16)
AST SERPL-CCNC: 22 U/L (ref 10–40)
BASOPHILS # BLD AUTO: 0.05 K/UL (ref 0–0.2)
BASOPHILS NFR BLD: 0.9 % (ref 0–1.9)
BILIRUB SERPL-MCNC: 1.5 MG/DL (ref 0.1–1)
BUN SERPL-MCNC: 15 MG/DL (ref 8–23)
CALCIUM SERPL-MCNC: 9.5 MG/DL (ref 8.7–10.5)
CHLORIDE SERPL-SCNC: 104 MMOL/L (ref 95–110)
CHOLEST SERPL-MCNC: 184 MG/DL (ref 120–199)
CHOLEST/HDLC SERPL: 3.1 {RATIO} (ref 2–5)
CO2 SERPL-SCNC: 26 MMOL/L (ref 23–29)
CREAT SERPL-MCNC: 0.8 MG/DL (ref 0.5–1.4)
DIFFERENTIAL METHOD BLD: ABNORMAL
EOSINOPHIL # BLD AUTO: 0.2 K/UL (ref 0–0.5)
EOSINOPHIL NFR BLD: 2.6 % (ref 0–8)
ERYTHROCYTE [DISTWIDTH] IN BLOOD BY AUTOMATED COUNT: 12.1 % (ref 11.5–14.5)
EST. GFR  (NO RACE VARIABLE): >60 ML/MIN/1.73 M^2
ESTIMATED AVG GLUCOSE: 105 MG/DL (ref 68–131)
GLUCOSE SERPL-MCNC: 108 MG/DL (ref 70–110)
HBA1C MFR BLD: 5.3 % (ref 4–5.6)
HCT VFR BLD AUTO: 42.6 % (ref 40–54)
HDLC SERPL-MCNC: 60 MG/DL (ref 40–75)
HDLC SERPL: 32.6 % (ref 20–50)
HGB BLD-MCNC: 14.8 G/DL (ref 14–18)
IMM GRANULOCYTES # BLD AUTO: 0.01 K/UL (ref 0–0.04)
IMM GRANULOCYTES NFR BLD AUTO: 0.2 % (ref 0–0.5)
LDLC SERPL CALC-MCNC: 91.2 MG/DL (ref 63–159)
LYMPHOCYTES # BLD AUTO: 1.2 K/UL (ref 1–4.8)
LYMPHOCYTES NFR BLD: 19.7 % (ref 18–48)
MCH RBC QN AUTO: 30.3 PG (ref 27–31)
MCHC RBC AUTO-ENTMCNC: 34.7 G/DL (ref 32–36)
MCV RBC AUTO: 87 FL (ref 82–98)
MONOCYTES # BLD AUTO: 0.7 K/UL (ref 0.3–1)
MONOCYTES NFR BLD: 11.1 % (ref 4–15)
NEUTROPHILS # BLD AUTO: 3.9 K/UL (ref 1.8–7.7)
NEUTROPHILS NFR BLD: 65.5 % (ref 38–73)
NONHDLC SERPL-MCNC: 124 MG/DL
NRBC BLD-RTO: 0 /100 WBC
PLATELET # BLD AUTO: 234 K/UL (ref 150–450)
PMV BLD AUTO: 9.1 FL (ref 9.2–12.9)
POTASSIUM SERPL-SCNC: 4.1 MMOL/L (ref 3.5–5.1)
PROT SERPL-MCNC: 7.2 G/DL (ref 6–8.4)
RBC # BLD AUTO: 4.88 M/UL (ref 4.6–6.2)
SODIUM SERPL-SCNC: 137 MMOL/L (ref 136–145)
TRIGL SERPL-MCNC: 164 MG/DL (ref 30–150)
WBC # BLD AUTO: 5.88 K/UL (ref 3.9–12.7)

## 2024-11-19 PROCEDURE — 83036 HEMOGLOBIN GLYCOSYLATED A1C: CPT | Performed by: NURSE PRACTITIONER

## 2024-11-19 PROCEDURE — 36415 COLL VENOUS BLD VENIPUNCTURE: CPT | Performed by: NURSE PRACTITIONER

## 2024-11-19 PROCEDURE — 80053 COMPREHEN METABOLIC PANEL: CPT | Performed by: NURSE PRACTITIONER

## 2024-11-19 PROCEDURE — 85025 COMPLETE CBC W/AUTO DIFF WBC: CPT | Performed by: NURSE PRACTITIONER

## 2024-11-19 PROCEDURE — 80061 LIPID PANEL: CPT | Performed by: NURSE PRACTITIONER

## 2024-11-27 ENCOUNTER — HOSPITAL ENCOUNTER (OUTPATIENT)
Dept: RADIOLOGY | Facility: HOSPITAL | Age: 64
Discharge: HOME | End: 2024-11-27
Attending: THORACIC SURGERY (CARDIOTHORACIC VASCULAR SURGERY)
Payer: MEDICARE

## 2024-11-27 DIAGNOSIS — Z85.118 HISTORY OF LUNG CANCER: ICD-10-CM

## 2024-11-27 LAB — POCT GLUCOSE: 99 MG/DL (ref 70–110)

## 2024-11-27 PROCEDURE — A9552 F18 FDG: HCPCS | Performed by: THORACIC SURGERY (CARDIOTHORACIC VASCULAR SURGERY)

## 2024-11-27 PROCEDURE — 78815 PET IMAGE W/CT SKULL-THIGH: CPT | Mod: TC

## 2024-11-27 RX ORDER — FLUDEOXYGLUCOSE F18 500 MCI/ML
13.15 INJECTION INTRAVENOUS
Status: COMPLETED | OUTPATIENT
Start: 2024-11-27 | End: 2024-11-27

## 2024-11-27 RX ADMIN — FLUDEOXYGLUCOSE F-18 13.15 MILLICURIE: 500 INJECTION INTRAVENOUS at 01:11

## 2024-12-01 ENCOUNTER — PATIENT MESSAGE (OUTPATIENT)
Dept: CARDIOTHORACIC SURGERY | Facility: CLINIC | Age: 64
End: 2024-12-01
Payer: MEDICARE

## 2024-12-02 ENCOUNTER — TELEPHONE (OUTPATIENT)
Dept: PULMONOLOGY | Facility: CLINIC | Age: 64
End: 2024-12-02
Payer: MEDICARE

## 2024-12-02 ENCOUNTER — PATIENT MESSAGE (OUTPATIENT)
Dept: PULMONOLOGY | Facility: CLINIC | Age: 64
End: 2024-12-02
Payer: MEDICARE

## 2024-12-02 DIAGNOSIS — R91.1 PULMONARY NODULE 1 CM OR GREATER IN DIAMETER: Primary | ICD-10-CM

## 2024-12-02 NOTE — TELEPHONE ENCOUNTER
I spoke with patient. Patient scheduled to see Dr Mcdonald on 12/23/24 at 9am for ebus/robotic bronchoscopy consult from Emily Benz PA-C scheduled today. Patient stated he is scheduled to have a shoulder replacement on 1/9/25 and would like to be seen sooner than scheduled if possible. If biopsy could be before end of the year prior to shoulder replacement. I let him know I would send a message for review and to advise to Dr Mcdonald. Patient verbalized understanding.

## 2024-12-02 NOTE — TELEPHONE ENCOUNTER
I spoke with patient to let him know arrival time to [facility] for 5:30am for robotic bronchoscopy with Dr Mcdonald on 12/27/24. Stop ASA on 12/20/24. Ct scan scheduled on 12/19/24 at 9:45am at imaging center. I sent bronchoscopy instructions in patient portal and went over instructions. I answered all questions. Patient confirmed and verbalized understanding.

## 2024-12-02 NOTE — TELEPHONE ENCOUNTER
----- Message from Jonathan sent at 12/2/2024 11:14 AM CST -----  Consult/Advisory    Name Of Caller:Justus       Contact Preference:[phone]    Nature of call: Pt called regarding a sooner appt I did inform him nothing is showing sooner he asked if he can rec a call please call to assist

## 2024-12-10 ENCOUNTER — HOSPITAL ENCOUNTER (OUTPATIENT)
Dept: RADIOLOGY | Facility: HOSPITAL | Age: 64
Discharge: HOME | End: 2024-12-10
Attending: INTERNAL MEDICINE
Payer: MEDICARE

## 2024-12-10 ENCOUNTER — OFFICE VISIT (OUTPATIENT)
Dept: PRIMARY CARE CLINIC | Facility: CLINIC | Age: 64
End: 2024-12-10
Payer: MEDICARE

## 2024-12-10 VITALS
HEIGHT: 70 IN | SYSTOLIC BLOOD PRESSURE: 136 MMHG | HEART RATE: 86 BPM | BODY MASS INDEX: 31.43 KG/M2 | WEIGHT: 219.56 LBS | OXYGEN SATURATION: 95 % | DIASTOLIC BLOOD PRESSURE: 80 MMHG

## 2024-12-10 DIAGNOSIS — I10 ESSENTIAL HYPERTENSION: ICD-10-CM

## 2024-12-10 DIAGNOSIS — D3A.090 CARCINOID TUMOR OF RIGHT LUNG: ICD-10-CM

## 2024-12-10 DIAGNOSIS — C34.31 MALIGNANT NEOPLASM OF LOWER LOBE OF RIGHT LUNG: ICD-10-CM

## 2024-12-10 DIAGNOSIS — Z01.818 PRE-OP EXAMINATION: ICD-10-CM

## 2024-12-10 DIAGNOSIS — I70.0 AORTIC ATHEROSCLEROSIS: ICD-10-CM

## 2024-12-10 DIAGNOSIS — Z79.899 ENCOUNTER FOR LONG-TERM CURRENT USE OF MEDICATION: ICD-10-CM

## 2024-12-10 DIAGNOSIS — E78.2 HYPERLIPIDEMIA, MIXED: ICD-10-CM

## 2024-12-10 DIAGNOSIS — F41.9 ANXIETY: ICD-10-CM

## 2024-12-10 DIAGNOSIS — C34.31 MALIGNANT NEOPLASM OF LOWER LOBE OF RIGHT LUNG: Primary | ICD-10-CM

## 2024-12-10 DIAGNOSIS — D68.9 CLOTTING DISORDER: ICD-10-CM

## 2024-12-10 PROCEDURE — 71046 X-RAY EXAM CHEST 2 VIEWS: CPT | Mod: 26,,, | Performed by: RADIOLOGY

## 2024-12-10 PROCEDURE — 3044F HG A1C LEVEL LT 7.0%: CPT | Mod: CPTII,S$GLB,, | Performed by: INTERNAL MEDICINE

## 2024-12-10 PROCEDURE — 99214 OFFICE O/P EST MOD 30 MIN: CPT | Mod: S$GLB,,, | Performed by: INTERNAL MEDICINE

## 2024-12-10 PROCEDURE — 3075F SYST BP GE 130 - 139MM HG: CPT | Mod: CPTII,S$GLB,, | Performed by: INTERNAL MEDICINE

## 2024-12-10 PROCEDURE — 3079F DIAST BP 80-89 MM HG: CPT | Mod: CPTII,S$GLB,, | Performed by: INTERNAL MEDICINE

## 2024-12-10 PROCEDURE — 99999 PR PBB SHADOW E&M-EST. PATIENT-LVL III: CPT | Mod: PBBFAC,,, | Performed by: INTERNAL MEDICINE

## 2024-12-10 PROCEDURE — 71046 X-RAY EXAM CHEST 2 VIEWS: CPT | Mod: TC

## 2024-12-10 PROCEDURE — 3008F BODY MASS INDEX DOCD: CPT | Mod: CPTII,S$GLB,, | Performed by: INTERNAL MEDICINE

## 2024-12-10 PROCEDURE — 1126F AMNT PAIN NOTED NONE PRSNT: CPT | Mod: CPTII,S$GLB,, | Performed by: INTERNAL MEDICINE

## 2024-12-10 NOTE — PROGRESS NOTES
[facility] Note    Chief Complaint      Chief Complaint   Patient presents with    Annual Exam    Pre-op Exam       History of Present Illness      History of Present Illness            Typical carcinoid tumor of RLL: Seeing Dr. Saeed and Dr. Mcdonald.  S/p right lobectomy.    HTN: BP at goal on irbesartan-hctz.  Sees Dr. Leija as needed.  HLD: Controlled on crestor.  LDL 91.  On every other day to avoid joint pain.  BPH, Prostate cancer: Sees Dr. Flores [facility] and Dr. Wilner MD [place]..  On cialis daily. S/P robotic prostatectomy.  Surveillance labs every 3 months.  Gilbert disease: LFTs stable.  Anxiety: Controlled on lorazepam as needed.    Alopecia: Sees dermatology.  On minoxidil currently.  Flu shot UTD.  Td within 10 yrs.  Plan to revaccinate in 2025.  Shingrix discussed.  Pneumonia vaccine due age 63.  COVID UTD.  Cscope Dr. Solano, 4-5 yrs ago, no polyps, 10 yr interval.      Assessment/Plan     Justus Shaikh is a 64 y.o.male with:    Assessment & Plan              1. Malignant neoplasm of lower lobe of right lung    2. Carcinoid tumor of right lung    3. Essential hypertension    4. Hyperlipidemia, mixed    5. Aortic atherosclerosis    6. Anxiety      Chronic conditions status updated as per HPI.  Other than changes above, cont current medications and maintain follow up with specialists.  No follow-ups on file.    Future Appointments   Date Time Provider Department Center   12/19/2024  9:45 AM [facility]CT2 500 LB LIMIT [facility]           Past Medical History:  History reviewed. No pertinent past medical history.    Past Surgical History:   has a past surgical history that includes Rotator cuff repair (Right); Hand surgery (Right); Knee surgery; robotic bronchoscopy (N/A, 05/06/2022); Hernia repair (1961); Tonsillectomy (1963); Vasectomy (1998); xi robotic rats,with lobectomy,lung (Right, 06/27/2022); Injection of anesthetic agent around multiple intercostal  nerves (Right, 06/27/2022); Prostate surgery (3/2024); and Joint replacement (10/2023).    Family History:  family history includes Arthritis in his father and sister; Asthma in his son; Cancer in his brother; Diabetes in his father; Drug abuse in his brother; Heart disease in his father and mother; Hypertension in his mother; Learning disabilities in his brother and son.     Social History:  Social History     Tobacco Use    Smoking status: Light Smoker     Types: Cigars    Smokeless tobacco: Never   Substance Use Topics    Alcohol use: Yes     Alcohol/week: 10.0 standard drinks of alcohol     Types: 10 Glasses of wine per week    Drug use: Never       Medications:  Outpatient Encounter Medications as of 12/10/2024   Medication Sig Note Dispense Refill    albuterol (PROVENTIL) 2.5 mg /3 mL (0.083 %) nebulizer solution Take 3 mLs (2.5 mg total) by nebulization 3 (three) times daily as needed for Wheezing or Shortness of Breath.  180 each 2    aspirin (ECOTRIN) 81 MG EC tablet Take 81 mg by mouth once daily.       COLLAGEN MISC by Misc.(Non-Drug; Combo Route) route.       esomeprazole magnesium (NEXIUM 24HR ORAL) Take by mouth.       folic acid/multivit-min/lutein (CENTRUM SILVER ORAL) Take by mouth.       hydroCHLOROthiazide (MICROZIDE) 12.5 mg capsule Take 1 capsule (12.5 mg total) by mouth daily as needed (edema).  30 capsule 3    irbesartan-hydrochlorothiazide (AVALIDE) 300-12.5 mg per tablet TAKE 1 TABLET EVERY DAY  90 tablet 3    LORazepam (ATIVAN) 1 MG tablet TAKE 1 TABLET EVERY DAY AS NEEDED FOR ANXIETY  30 tablet 2    minoxidiL (LONITEN) 2.5 MG tablet Take 5 mg by mouth nightly.       omega 3-dha-epa-fish oil 1,200 (144-216) mg Cap Take by mouth.       rosuvastatin (CRESTOR) 20 MG tablet TAKE 1 TABLET EVERY OTHER DAY  45 tablet 2    tadalafiL (CIALIS) 5 MG tablet Take 5 mg by mouth daily as needed for Erectile Dysfunction. 6/24/2022: Hold am of surgery       VIOS AEROSOL DELIVERY SYSTEM Georgette Take by  "nebulization as needed.        No facility-administered encounter medications on file as of 12/10/2024.       Allergies:  Review of patient's allergies indicates:   Allergen Reactions    Sudafed cold-allergy Palpitations       Health Maintenance:  Immunization History   Administered Date(s) Administered    COVID-19, MRNA, LN-S, PF (MODERNA FULL 0.5 ML DOSE) 02/25/2021, 03/23/2021    Influenza (FLUAD) - Quadrivalent - Adjuvanted - PF *Preferred* (65+) 09/19/2023    Influenza - Quadrivalent - MDCK - PF 10/23/2019, 09/16/2020, 11/21/2022    Influenza - Quadrivalent - PF *Preferred* (6 months and older) 12/08/2021, 12/08/2021    Influenza - Trivalent - Fluad - Adjuvanted - PF (65 years and older 08/28/2024    Zoster Recombinant 03/19/2023      Health Maintenance   Topic Date Due    Pneumococcal Vaccines (Age 65+) (1 of 2 - PCV) Never done    TETANUS VACCINE  Never done    RSV Vaccine (Age 60+ and Pregnant patients) (1 - Risk 60-74 years 1-dose series) Never done    COVID-19 Vaccine (3 - Moderna risk series) 04/20/2021    Shingles Vaccine (2 of 2) 05/14/2023    High Dose Statin  11/13/2025    Hemoglobin A1c (Prediabetes)  11/19/2025    Lipid Panel  11/19/2029    Colorectal Cancer Screening  10/31/2034    Hepatitis C Screening  Completed    Influenza Vaccine  Completed    Abdominal Aortic Aneurysm Screening  Completed        Physical Exam      Vital Signs  Pulse: 86  SpO2: 95 %  BP: 136/80  BP Location: Left arm  Patient Position: Sitting  Pain Score: 0-No pain  Height and Weight  Height: 5' 10" (177.8 cm)  Weight: 99.6 kg (219 lb 9.3 oz)  BSA (Calculated - sq m): 2.22 sq meters  BMI (Calculated): 31.5  Weight in (lb) to have BMI = 25: 173.9]    Physical Exam              Physical Exam  Vitals reviewed.   Constitutional:       Appearance: He is well-developed.   HENT:      Head: Normocephalic and atraumatic.      Right Ear: External ear normal.      Left Ear: External ear normal.   Cardiovascular:      Rate and Rhythm: " Normal rate and regular rhythm.      Heart sounds: Normal heart sounds. No murmur heard.  Pulmonary:      Effort: Pulmonary effort is normal.      Breath sounds: Normal breath sounds. No wheezing or rales.   Abdominal:      General: Bowel sounds are normal.      Palpations: Abdomen is soft.         Laboratory:    Results              CBC:  Recent Labs   Lab 09/26/23  1022 11/21/23  0811 11/19/24  0949   WBC 4.99 5.4 5.88   RBC 4.72 4.54 4.88   Hemoglobin 14.5 13.8 14.8   Hematocrit 39.8 L 40.6 42.6   Platelets 248 281 234   MCV 84 89.5 87   MCH 30.7 30.5 30.3   MCHC 36.4 H 34.1 34.7     CMP:  Recent Labs   Lab 11/21/23  0811 02/02/24  1035 11/19/24  0949   Glucose 95 99 108   Calcium 9.1 9.5 9.5   Albumin  --   --  4.2   ALBUMIN 4.2  --   --    Albumin Level  --  4.6  --    Total Protein 5.9 L  --  7.2   Sodium 138 135 137   Potassium 4.4 4.6 4.1   CO2 28  --  26   Carbon Dioxide  --  29  --    Chloride 100  --  104   BUN 10.0  --  15   Blood Urea Nitrogen  --  15  --    Alkaline Phosphatase 95 85 84   ALT 13 15 22   AST 15 15 22   Total Bilirubin 1.2 1.0 1.5 H     URINALYSIS:  Recent Labs   Lab 09/26/23  1148   Color, UA Yellow   Specific Gravity, UA 1.010   pH, UA 7.0   Protein, UA Negative   Nitrite, UA Negative   Leukocytes, UA Negative      LIPIDS:  Recent Labs   Lab 06/16/23  0746 11/21/23  0811 11/19/24  0949   HDL 64 51 60   Cholesterol 172 160 184   Triglycerides 133 148 164 H   LDL Cholesterol  --   --  91.2   LDL Calculated 87 79  --    HDL/Cholesterol Ratio  --   --  32.6   Non-HDL Cholesterol  --   --  124   Total Cholesterol/HDL Ratio  --   --  3.1     TSH:      A1C:  Recent Labs   Lab 06/03/22  0843 12/08/22  0815 06/16/23  0746 09/26/23  1022 11/21/23  0811 11/19/24  0949   Hemoglobin A1C 5.3 5.4 5.5 5.2 5.5 5.3           This note was generated with the assistance of ambient listening technology. Verbal consent was obtained by the patient and accompanying visitor(s) for the recording of patient  appointment to facilitate this note. I attest to having reviewed and edited the generated note for accuracy, though some syntax or spelling errors may persist. Please contact the author of this note for any clarification.      Isidro Worthington MD  [facility]

## 2024-12-12 ENCOUNTER — TELEPHONE (OUTPATIENT)
Dept: PRIMARY CARE CLINIC | Facility: CLINIC | Age: 64
End: 2024-12-12
Payer: MEDICARE

## 2024-12-12 DIAGNOSIS — Z01.818 PRE-OP EXAMINATION: Primary | ICD-10-CM

## 2024-12-12 NOTE — TELEPHONE ENCOUNTER
----- Message from Tanja Lerma sent at 12/12/2024  9:20 AM CST -----  Regarding: EKG Order  Please place and link an EKG order for the appointment scheduled on 12/10/24 thanks. Maria Guadalupe [phone]

## 2024-12-13 ENCOUNTER — PATIENT MESSAGE (OUTPATIENT)
Dept: PRIMARY CARE CLINIC | Facility: CLINIC | Age: 64
End: 2024-12-13
Payer: MEDICARE

## 2024-12-19 ENCOUNTER — HOSPITAL ENCOUNTER (OUTPATIENT)
Dept: RADIOLOGY | Facility: HOSPITAL | Age: 64
Discharge: HOME | End: 2024-12-19
Attending: EMERGENCY MEDICINE
Payer: MEDICARE

## 2024-12-19 DIAGNOSIS — R91.1 PULMONARY NODULE 1 CM OR GREATER IN DIAMETER: ICD-10-CM

## 2024-12-19 PROCEDURE — 71250 CT THORAX DX C-: CPT | Mod: TC

## 2024-12-19 PROCEDURE — 71250 CT THORAX DX C-: CPT | Mod: 26,,, | Performed by: RADIOLOGY

## 2024-12-26 ENCOUNTER — ANESTHESIA EVENT (OUTPATIENT)
Dept: SURGERY | Facility: HOSPITAL | Age: 64
End: 2024-12-26
Payer: MEDICARE

## 2024-12-26 NOTE — H&P
Pulmonary & Critical Care Medicine   HISTORY & PHYSICAL      HPI: Referral from Dr. Joe Ibarra for robotic bronchoscopy- This is a 61 y.o. male who is being seen in pulmonary clinic for evaluation of pulmonary nodule on CT chest.  This is  a new issue.  He presents with his wife, Nehal Shaikh. He had incidental COVID-19 December 2020.     A lung nodule was found on CT calcium scoring to eval for atherosclerotic disease with dedicated CT chest with contrast on 3/3/22 revealing an infrahilar nodule 2.5 cm with distal airway opacification.     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     His medical history includes hypertension and joint pains.  He denies dyspnea at rest or with exertion. No cough, no chest pain or tightness.   Has seasonal allergies and cough related to it. Cough is seasonal and not always persistent. Denies any weight loss. He reports frequently sweating easily compared to those around him and has tendency to have a flushed face and neck.      Never  had PFTs in the past.        Prior Visit: No significant cough/sputum production, + post nasal drip. No constitutional features. No exercise intolerance. No additional complaints. Wife does endorse occasional wheezing.   PFTs look great.         Additional Pulmonary History:   Occupational/Environmental Exposures:ran construction. Mostly in office.. Worked with  as summer jobs. + asbestos.. 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     Exposure to Animals/Pets: None. Had dogs in past   From [facility]. Lives in [place]..   Travel History: 2019-- [place]. 2018- [place]   History of exposures to TB: none   Family History of Lung Cancer: none   Childhood history of Lung Disease:asthma as child.   Chest surgery/Trauma- None   Aspiration events- none   IS- none   Anti-Plt/OAC-baby ASA + daily motrin.    Tobacco use- Cigars occ.   Recurrent PNA- None   Anesthesia complications- None    Sleep- Never formally diagnoses. Snores.         Prior Visit:       -- Underwent robotic bronchoscopy 5/2022 with me:   -- Pathology- typical carcinoid.   -- 6/27/22: right robotic assisted lower lobectomy with MLND     Pain never an issue and healing well healed from surgery. Main complaint is ongoing cough post operatively. Cough was constant, worse with deep inspiration and cool air. Occasionally productive of white sputum. Feels tickle in back of throat/PND- Seen by several providers including ENT/Allergy- Completed courses of abx + corticosteroids. Most recently seen by PCP- now on advair (had childhood asthma) + PPi. No nasal corticosteroids. Prior attempt at H1 antagonist, but now off. Contracted COVID about 1 week prior. Very minimal symptoms.   Over the last several days he has improved symptoms.  Still with occasional cough, but much improved from where it has been. No F/C/NS or systemic features. Post op CT pending.      PRIOR VISIT:   Doing well. Cough much improved. Still intermittent, but not really interfering with life. Still with concern about bulging near surgical incision site.. Had CT A/P but no abnormality identified. More active and increasing exercise.   Remains on laba/ICS and prn albuterol.. Does not require daily.   Did have COVID near onset of cough initially.   No additional complaints.   Repeat CT chest and follow up with thoracic pending.      INTERVAL HISTORY-    Underwent robotic 2022- Typical carcinoid-  presents to clinic for 2 year follow up s/p right robotic assisted lower lobectomy with MLND for typical carcinoid of right lower lobe. Uncomplicated post-operative course. Here today with updated chest CT. Today patient reports he is doing well. Reports new diagnosis of prostate cancer s/p prostatectomy 03/2024 at [facility] as well as recent R TKR. Otherwise doing well.     New RML nodule on surveillance imaging.. here for robotic. + Airway sign.   No OAC/Anti-PLT.   No GLP     "        No past medical history on file.            Past Surgical History:   Procedure Laterality Date    HAND SURGERY Right      KNEE SURGERY        ROTATOR CUFF REPAIR Right        Family/Social History: Reviewed and updated.         Drug Allergies:           Review of patient's allergies indicates:   Allergen Reactions    Pseudoephedrine hcl        Review of Systems:   A comprehensive 12-point review of systems was performed, and is negative except for those items mentioned above in the HPI section of this note.      VITALS: BP (!) 166/93 (BP Location: Right arm, Patient Position: Lying)   Pulse 81   Temp 98.7 °F (37.1 °C) (Oral)   Resp 16   Ht 5' 8" (1.727 m)   Wt 95.3 kg (210 lb)   SpO2 97%   BMI 31.93 kg/m²       Physical Exam:   GEN- NAD AAOx3 Well Built, Well Appearing   HEENT- ATNC, PERRLA, EOMI, OP-Cl. No JVD, LAD or bruit noted. Trachea Midline.   CV- RRR No M/R/G  RESP- CTA-Bilateral- Chest wall incision sites C/D/I   GI- S/NT/ND. Positive BS X 4. No HSM Noted  BACK- Spine midline. No step off, crepitus or deformity noted. No midline TTP.   Ext- MAEW, No deformity. No edema or rashes noted.   Neuro- Strength 5/5 symmetric. CN 2-12 intact. Normal gait. Normal sensation.       Personal Review and Summary of Prior Diagnostics     Laboratory Studies: Reviewed       Latest Reference Range & Units Most Recent   WBC 3.90 - 12.70 K/uL 5.88  11/19/24 09:49   RBC 4.60 - 6.20 M/uL 4.88  11/19/24 09:49   Hemoglobin 14.0 - 18.0 g/dL 14.8  11/19/24 09:49   Hematocrit 40.0 - 54.0 % 42.6  11/19/24 09:49   MCV 82 - 98 fL 87  11/19/24 09:49   MCH 27.0 - 31.0 pg 30.3  11/19/24 09:49   MCHC 32.0 - 36.0 g/dL 34.7  11/19/24 09:49   RDW 11.5 - 14.5 % 12.1  11/19/24 09:49   Platelet Count 150 - 450 K/uL 234  11/19/24 09:49   MPV 9.2 - 12.9 fL 9.1 (L)  11/19/24 09:49   Gran % 38.0 - 73.0 % 65.5  11/19/24 09:49   Neutrophils Relative % 63.7  11/21/23 08:11   Lymph % 18.0 - 48.0 % 19.7  11/19/24 09:49   Lymphocytes % % " 21.7  11/21/23 08:11   Mono % 4.0 - 15.0 % 11.1  11/19/24 09:49   Monocytes % 9.5  11/21/23 08:11   Eos % 0.0 - 8.0 % 2.6  11/19/24 09:49   Basophil % 0.0 - 1.9 % 0.9  11/19/24 09:49   Immature Granulocytes 0.0 - 0.5 % 0.2  11/19/24 09:49   Gran # (ANC) 1.8 - 7.7 K/uL 3.9  11/19/24 09:49   Neutrophils, Abs 1.80 - 8.00 Thousand/uL 3.40  11/21/23 08:11   Lymph # 1.0 - 4.8 K/uL 1.2  11/19/24 09:49   Lymphocytes Absolute 1.10 - 5.00 Thousand/uL 1.20  11/21/23 08:11   Mono # 0.3 - 1.0 K/uL 0.7  11/19/24 09:49   Eos # 0.0 - 0.5 K/uL 0.2  11/19/24 09:49   Eosinophils Absolute 0.00 - 0.60 Thousand/uL 0.20  11/21/23 08:11   Basophils Absolute 0.00 - 0.20 Thousand/uL 0.00  11/21/23 08:11   Baso # 0.00 - 0.20 K/uL 0.05  11/19/24 09:49   Immature Grans (Abs) 0.00 - 0.04 K/uL 0.01  11/19/24 09:49   nRBC 0 /100 WBC 0  11/19/24 09:49   Differential Type  Auto  6/4/21 08:28   Differential Method  Automated  11/19/24 09:49   PT 9.0 - 12.5 sec 11.1  12/10/24 15:12   INR 0.8 - 1.2  1.0  12/10/24 15:12   PTT 21.0 - 32.0 sec 31.6  12/10/24 15:12   Sodium 136 - 145 mmol/L 137  11/19/24 09:49   Potassium 3.5 - 5.1 mmol/L 4.1  11/19/24 09:49   Potassium 3.5 - 5.3 mmol/L 4.6 (E)  2/2/24 10:35   Chloride 95 - 110 mmol/L 104  11/19/24 09:49   CO2 23 - 29 mmol/L 26  11/19/24 09:49   Anion Gap 8 - 16 mmol/L 7 (L)  11/19/24 09:49   BUN 8 - 23 mg/dL 15  11/19/24 09:49   BUN 7.0 - 25.0 mg/dL 10.0  11/21/23 08:11   Creatinine 0.5 - 1.4 mg/dL 0.8  11/19/24 09:49   BUN/CREAT RATIO 6 - 22 (calc) SEE NOTE: (E)  2/2/24 10:35   eGFR >60 mL/min/1.73 m^2 >60.0  11/19/24 09:49   eGFR > OR = 60 mL/min/1.73m2 100 (E)  2/2/24 10:35   GFR >=60 mL/min 92  6/3/22 08:43   Glucose 70 - 110 mg/dL 108  11/19/24 09:49   Calcium 8.7 - 10.5 mg/dL 9.5  11/19/24 09:49   ALP 40 - 150 U/L 84  11/19/24 09:49   PROTEIN TOTAL 6.0 - 8.4 g/dL 7.2  11/19/24 09:49   Total Protein 6.0 - 8.0 g/dL 5.9 (L)  11/21/23 08:11   Albumin 3.5 - 5.2 g/dL 4.2  11/19/24 09:49   BILIRUBIN TOTAL  0.1 - 1.0 mg/dL 1.5 (H)  11/19/24 09:49   AST 10 - 40 U/L 22  11/19/24 09:49   ALT 10 - 44 U/L 22  11/19/24 09:49   Calc Osmolality 275 - 295 mOsm/kg 270 (L)  6/16/23 07:46   Cholesterol Total 120 - 199 mg/dL 184  11/19/24 09:49   HDL 40 - 75 mg/dL 60  11/19/24 09:49   HDL/Cholesterol Ratio 20.0 - 50.0 % 32.6  11/19/24 09:49   CHOL/HDLC Ratio 0.00 - 5.00  3.14  11/21/23 08:11   LDL Calculated <130 mg/dL 79  11/21/23 08:11   Non-HDL Cholesterol mg/dL 124  11/19/24 09:49   Total Cholesterol/HDL Ratio 2.0 - 5.0  3.1  11/19/24 09:49   Triglycerides 30 - 150 mg/dL 164 (H)  11/19/24 09:49   LDL Cholesterol 63.0 - 159.0 mg/dL 91.2  11/19/24 09:49   Hemoglobin A1C External 4.0 - 5.6 % 5.3  11/19/24 09:49   Estimated Avg Glucose 68 - 131 mg/dL 105  11/19/24 09:49   TSH 0.35 - 4.00 uIL/mL 2.44  6/4/21 08:28   Free T4 0.9 - 1.7 Nanogram/dL 1.1  6/4/21 08:28   Total PSA < OR = 4.0 ng/mL 6.4 (H)  11/21/23 08:11   Free PSA ng/mL 0.6  11/21/23 08:11   % Free PSA >25 % (calc) 9 (L)  11/21/23 08:11   Testosterone Total 250 - 1,100 ng/dL 313  12/8/22 08:23   A. alternata IgE <0.10 kU/L <0.10  7/19/22 10:58   Altern. alternata Class  CLASS 0  7/19/22 10:58   Aspergillus Fumigatus IgE <0.10 kU/L <0.10  7/19/22 10:58   A. fumigatus Class  CLASS 0  7/19/22 10:58   Bahia Grass IgE <0.10 kU/L 0.43 (H)  7/19/22 10:58   Bahia Class  CLASS 1  7/19/22 10:58   Bald [employer]user Class  CLASS 0  7/19/22 10:58   Bermuda Grass IgE <0.10 kU/L <0.10  7/19/22 10:58   Bermuda Grass Class  CLASS 0  7/19/22 10:58   Curvularia lunata <0.10 kU/L <0.10  7/19/22 10:58   Curvularia Lunata Class  CLASS 0  7/19/22 10:58   Cat Dander IgE <0.10 kU/L 0.37 (H)  7/19/22 10:58   Cat Epithelium Class  CLASS 1  7/19/22 10:58   Chaetomium <0.10 kU/L <0.10  7/19/22 10:58   Chaetomium Glob. Class  CLASS 0  7/19/22 10:58   Cladosporium Class  CLASS 0  7/19/22 10:58   Cladosporium IgE <0.10 kU/L <0.10  7/19/22 10:58   Cockroach, IgE <0.10 kU/L  -  0.46 (H)  7/19/22 10:58  CLASS  1  7/19/22 10:58   Pocahontas Tree IgE <0.10 kU/L <0.10  7/19/22 10:58   Pocahontas Class  CLASS 0  7/19/22 10:58   [place] <0.10 kU/L <0.10  7/19/22 10:58   D. farinae <0.10 kU/L 0.73 (H)  7/19/22 10:58   D. farinae Class  CLASS 2  7/19/22 10:58   D. pteronyssinus Dust Mite IgE <0.10 kU/L 0.57 (H)  7/19/22 10:58   D. pteronyssinus Class  CLASS 1  7/19/22 10:58   Dog Dander IgE <0.10 kU/L <0.10  7/19/22 10:58   Dog Dander Class  CLASS 0  7/19/22 10:58   English Plantain IgE <0.10 kU/L <0.10  7/19/22 10:58   English Plantain Class  CLASS 0  7/19/22 10:58   Helminthosporium Class  CLASS 0  7/19/22 10:58   Helminthosporium Halodes IgE <0.10 kU/L <0.10  7/19/22 10:58   Horse Dander IgE <0.10 kU/L <0.10  7/19/22 10:58   Horse Dander Class  CLASS 0  7/19/22 10:58   Flako Grass IgE <0.10 kU/L 0.16 (H)  7/19/22 10:58   Flako Grass Class  CLASS 0/1  7/19/22 10:58   Maple/[place]stown IgE <0.10 kU/L <0.10  7/19/22 10:58   Maple/[place] Class  CLASS 0  7/19/22 10:58   Marshelder IgE <0.10 kU/L <0.10  7/19/22 10:58   Marshelder Class  CLASS 0  7/19/22 10:58   MUGWORT <0.10 kU/L <0.10  7/19/22 10:58   Mugwort Class  CLASS 0  7/19/22 10:58   Oak, Class  CLASS 2  7/19/22 10:58   Pecan [place] Tree IgE <0.10 kU/L <0.10  7/19/22 10:58   Pecan, Class  CLASS 0  7/19/22 10:58   Penicillium IgE <0.10 kU/L <0.10  7/19/22 10:58   Penicillium Class  CLASS 0  7/19/22 10:58   Ragweed, Short, Class  CLASS 0  7/19/22 10:58   Ragweed, Short, IgE <0.10 kU/L <0.10  7/19/22 10:58   Ragweed, Western IgE <0.10 kU/L <0.10  7/19/22 10:58   Ragweed, Western, Class  CLASS 0  7/19/22 10:58   Russian Thistle IgE <0.10 kU/L <0.10  7/19/22 10:58   Russian Thistle Class  CLASS 0  7/19/22 10:58   Silver Birch Tree IgE <0.10 kU/L 0.73 (H)  7/19/22 10:58   Silver Birch Class  CLASS 2  7/19/22 10:58   Justus Grass IgE <0.10 kU/L 0.82 (H)  7/19/22 10:58   Justus Grass Class  CLASS 2  7/19/22 10:58   [place] Tree IgE <0.10 kU/L <0.10  7/19/22 10:58   [place] Tree  Class  CLASS 0  7/19/22 10:58   Macon Tree IgE <0.10 kU/L 1.05 (H)  7/19/22 10:58   Milburn Tree IgE <0.10 kU/L <0.10  7/19/22 10:58   Milburn Class  CLASS 0  7/19/22 10:58   Ulster, IgE <0.10 kU/L <0.10  7/19/22 10:58   Ulster Class  CLASS 0  7/19/22 10:58              Microbiology Data: None      Summary of Chest Imaging Personally Reviewed:     CT CHEST 12/19-   Lungs and pleura: Postoperative changes of right lower lobectomy. Surgical margin appears similar to prior. Grossly stable 2.7 x 1.9 cm irregularly marginated nodular like abnormality in the right middle lobe (image 3 1 1 sequence 2). 2 mm nodule in the right lower lobe, unchanged (image 302 sequence 2). Bandlike atelectasis/fibrous scars are noted in the paravertebral right lower lobe, unchanged.         NM PET 11/27  1. Similar appearing right middle lobe nodular opacity noting mild radiotracer uptake          CT Chest 3/2022- 1. Heterogeneously enhancing infrahilar noncalcified nodule measuring up to 2.5 cm in size.  Findings are suspicious for neoplasm including carcinoid with some postobstructive changes seen within the right lower lobe inferior to this region.      NM PET 3/31- Non FDG avid infrahilar right lower lobe pulmonary nodule with associated bronchial obstruction.  Lack of significant FDG avidity and endobronchial association suggest potential carcinoid tumor     CXR 6/2022- Heart size is normal.  The osseous structures show scoliosis and degenerative change.  The lung fields are clear.  There is mild blunting of the right costophrenic angle which may reflect a small amount of pleural fluid or pleural thickening.     2D Echo: None      PFT's:      Pre lobe   FEV1/FVC- 70   FEV1- 2.91L (87%)   FVC- 4.14L (95%)   TLC- 93   RV- 96   DLCO- 98      Pre Lobe 6MWT-   5/19/2022---------Distance: 450.19 meters (1477 feet)       O2 Sat % Supplemental Oxygen Heart Rate Blood Pressure Shaheen Scale   Pre-exercise  (Resting) 96 % Room Air 79 bpm  141/85 mmHg 0   During Exercise 95 % Room Air 95 bpm 156/88 mmHg 1   Post-exercise  (Recovery) 96 % Room Air  88 bpm   mmHg        Recovery Time: 89 seconds         Assessment:       No diagnosis found.    Encounter Medications          Outpatient Encounter Medications as of 10/20/2022   Medication Sig Dispense Refill    albuterol (PROVENTIL) 2.5 mg /3 mL (0.083 %) nebulizer solution Take 3 mLs (2.5 mg total) by nebulization 3 (three) times daily as needed for Wheezing or Shortness of Breath. 180 each 2    aspirin (ECOTRIN) 81 MG EC tablet Take 81 mg by mouth once daily.        fluticasone-salmeterol diskus inhaler 250-50 mcg Inhale 1 puff into the lungs 2 (two) times daily. Controller 60 each 4    folic acid/multivit-min/lutein (CENTRUM SILVER ORAL) Take by mouth.        irbesartan-hydrochlorothiazide (AVALIDE) 300-12.5 mg per tablet Take 1 tablet by mouth once daily. 90 tablet 3    LORazepam (ATIVAN) 1 MG tablet Take 1 tablet (1 mg total) by mouth daily as needed for Anxiety. 30 tablet 2    minoxidiL (LONITEN) 2.5 MG tablet Take 1.25-2.5 mg by mouth nightly.        rosuvastatin (CRESTOR) 40 MG Tab Take 1 tablet (40 mg total) by mouth every evening. 90 tablet 3    tadalafiL (CIALIS) 5 MG tablet Take 5 mg by mouth daily as needed for Erectile Dysfunction.        VIOS AEROSOL DELIVERY SYSTEM Georgette Take by nebulization as needed.        [DISCONTINUED] hydroCHLOROthiazide (MICROZIDE) 12.5 mg capsule hydrochlorothiazide Take No date recorded No form recorded No frequency recorded No route recorded No set duration recorded No set duration amount recorded active No dosage strength recorded No dosage strength units of measure recorded          No facility-administered encounter medications on file as of 10/20/2022.         No orders of the defined types were placed in this encounter.        Plan:       1. Typical carcinoid- s/p robotic assisted RLL lobectomy  with MLND 6/27- Uneventful post operative course  2. RML  PULMONARY nodule- + Airway sign. Mild tracer avid on NM PET. Asymptomatic from a pulmonary standpoint.     -- KAYLYN + EBUS.  -- No OAC/Anti-PLT   -- Cyto + cultures.       Discussed procedure in detail with patient. Risks including bleeding, PTX, respiratory failure, non-diagnostic specimen, need for additional procedures and numerous additional potential complications up to death outlined. He verbalized understanding and all questions answered to his satisfaction. Written consent signed and on chart.     Derek J. Vonderhaar, MD [facility]                  Chava Mcdonald M.D.   [facility]

## 2024-12-27 ENCOUNTER — HOSPITAL ENCOUNTER (OUTPATIENT)
Facility: HOSPITAL | Age: 64
Discharge: HOME | End: 2024-12-27
Attending: EMERGENCY MEDICINE | Admitting: EMERGENCY MEDICINE
Payer: MEDICARE

## 2024-12-27 ENCOUNTER — ANESTHESIA (OUTPATIENT)
Dept: SURGERY | Facility: HOSPITAL | Age: 64
End: 2024-12-27
Payer: MEDICARE

## 2024-12-27 VITALS
HEIGHT: 68 IN | WEIGHT: 210 LBS | TEMPERATURE: 98 F | DIASTOLIC BLOOD PRESSURE: 74 MMHG | BODY MASS INDEX: 31.83 KG/M2 | SYSTOLIC BLOOD PRESSURE: 128 MMHG | RESPIRATION RATE: 16 BRPM | HEART RATE: 76 BPM | OXYGEN SATURATION: 94 %

## 2024-12-27 DIAGNOSIS — R91.1 PULMONARY NODULE 1 CM OR GREATER IN DIAMETER: Primary | ICD-10-CM

## 2024-12-27 LAB
GRAM STN SPEC: NORMAL
GRAM STN SPEC: NORMAL

## 2024-12-27 PROCEDURE — 36000709 HC OR TIME LEV III EA ADD 15 MIN: Performed by: EMERGENCY MEDICINE

## 2024-12-27 PROCEDURE — 63600175 PHARM REV CODE 636 W HCPCS: Performed by: NURSE ANESTHETIST, CERTIFIED REGISTERED

## 2024-12-27 PROCEDURE — 31627 NAVIGATIONAL BRONCHOSCOPY: CPT | Mod: ,,, | Performed by: EMERGENCY MEDICINE

## 2024-12-27 PROCEDURE — 88112 CYTOPATH CELL ENHANCE TECH: CPT | Performed by: PATHOLOGY

## 2024-12-27 PROCEDURE — 87075 CULTR BACTERIA EXCEPT BLOOD: CPT | Performed by: EMERGENCY MEDICINE

## 2024-12-27 PROCEDURE — 87116 MYCOBACTERIA CULTURE: CPT | Performed by: EMERGENCY MEDICINE

## 2024-12-27 PROCEDURE — 63600175 PHARM REV CODE 636 W HCPCS: Performed by: EMERGENCY MEDICINE

## 2024-12-27 PROCEDURE — 87102 FUNGUS ISOLATION CULTURE: CPT | Performed by: EMERGENCY MEDICINE

## 2024-12-27 PROCEDURE — 87070 CULTURE OTHR SPECIMN AEROBIC: CPT | Performed by: EMERGENCY MEDICINE

## 2024-12-27 PROCEDURE — 88305 TISSUE EXAM BY PATHOLOGIST: CPT | Mod: 59 | Performed by: PATHOLOGY

## 2024-12-27 PROCEDURE — 88173 CYTOPATH EVAL FNA REPORT: CPT | Performed by: PATHOLOGY

## 2024-12-27 PROCEDURE — 31654 BRONCH EBUS IVNTJ PERPH LES: CPT | Mod: ,,, | Performed by: EMERGENCY MEDICINE

## 2024-12-27 PROCEDURE — 71000015 HC POSTOP RECOV 1ST HR: Performed by: EMERGENCY MEDICINE

## 2024-12-27 PROCEDURE — 88312 SPECIAL STAINS GROUP 1: CPT | Performed by: PATHOLOGY

## 2024-12-27 PROCEDURE — 36000708 HC OR TIME LEV III 1ST 15 MIN: Performed by: EMERGENCY MEDICINE

## 2024-12-27 PROCEDURE — 87205 SMEAR GRAM STAIN: CPT | Performed by: EMERGENCY MEDICINE

## 2024-12-27 PROCEDURE — 31628 BRONCHOSCOPY/LUNG BX EACH: CPT | Mod: 51,,, | Performed by: EMERGENCY MEDICINE

## 2024-12-27 PROCEDURE — 37000009 HC ANESTHESIA EA ADD 15 MINS: Performed by: EMERGENCY MEDICINE

## 2024-12-27 PROCEDURE — 87206 SMEAR FLUORESCENT/ACID STAI: CPT | Performed by: EMERGENCY MEDICINE

## 2024-12-27 PROCEDURE — 31624 DX BRONCHOSCOPE/LAVAGE: CPT | Mod: 51,,, | Performed by: EMERGENCY MEDICINE

## 2024-12-27 PROCEDURE — 27201423 OPTIME MED/SURG SUP & DEVICES STERILE SUPPLY: Performed by: EMERGENCY MEDICINE

## 2024-12-27 PROCEDURE — 31629 BRONCHOSCOPY/NEEDLE BX EACH: CPT | Mod: ,,, | Performed by: EMERGENCY MEDICINE

## 2024-12-27 PROCEDURE — 25000003 PHARM REV CODE 250: Performed by: NURSE ANESTHETIST, CERTIFIED REGISTERED

## 2024-12-27 PROCEDURE — 71000044 HC DOSC ROUTINE RECOVERY FIRST HOUR: Performed by: EMERGENCY MEDICINE

## 2024-12-27 PROCEDURE — 37000008 HC ANESTHESIA 1ST 15 MINUTES: Performed by: EMERGENCY MEDICINE

## 2024-12-27 RX ORDER — FENTANYL CITRATE 50 UG/ML
25 INJECTION, SOLUTION INTRAMUSCULAR; INTRAVENOUS EVERY 5 MIN PRN
Status: DISCONTINUED | OUTPATIENT
Start: 2024-12-27 | End: 2024-12-27 | Stop reason: HOSPADM

## 2024-12-27 RX ORDER — SODIUM CHLORIDE, SODIUM LACTATE, POTASSIUM CHLORIDE, CALCIUM CHLORIDE 600; 310; 30; 20 MG/100ML; MG/100ML; MG/100ML; MG/100ML
INJECTION, SOLUTION INTRAVENOUS CONTINUOUS PRN
Status: DISCONTINUED | OUTPATIENT
Start: 2024-12-27 | End: 2024-12-27

## 2024-12-27 RX ORDER — FLUTICASONE PROPIONATE 50 MCG
2 SPRAY, SUSPENSION (ML) NASAL DAILY
COMMUNITY

## 2024-12-27 RX ORDER — FENTANYL CITRATE 50 UG/ML
INJECTION, SOLUTION INTRAMUSCULAR; INTRAVENOUS
Status: DISCONTINUED | OUTPATIENT
Start: 2024-12-27 | End: 2024-12-27

## 2024-12-27 RX ORDER — LIDOCAINE HYDROCHLORIDE 20 MG/ML
INJECTION INTRAVENOUS
Status: DISCONTINUED | OUTPATIENT
Start: 2024-12-27 | End: 2024-12-27

## 2024-12-27 RX ORDER — GLUCAGON 1 MG
1 KIT INJECTION
Status: DISCONTINUED | OUTPATIENT
Start: 2024-12-27 | End: 2024-12-27 | Stop reason: HOSPADM

## 2024-12-27 RX ORDER — OXYCODONE HYDROCHLORIDE 5 MG/1
5 TABLET ORAL
Status: DISCONTINUED | OUTPATIENT
Start: 2024-12-27 | End: 2024-12-27 | Stop reason: HOSPADM

## 2024-12-27 RX ORDER — ROCURONIUM BROMIDE 10 MG/ML
INJECTION, SOLUTION INTRAVENOUS
Status: DISCONTINUED | OUTPATIENT
Start: 2024-12-27 | End: 2024-12-27

## 2024-12-27 RX ORDER — IBUPROFEN 200 MG
600 TABLET ORAL EVERY 6 HOURS PRN
COMMUNITY

## 2024-12-27 RX ORDER — LIDOCAINE HYDROCHLORIDE 10 MG/ML
INJECTION, SOLUTION EPIDURAL; INFILTRATION; INTRACAUDAL; PERINEURAL
Status: DISCONTINUED | OUTPATIENT
Start: 2024-12-27 | End: 2024-12-27 | Stop reason: HOSPADM

## 2024-12-27 RX ORDER — PHENYLEPHRINE HYDROCHLORIDE 10 MG/ML
INJECTION INTRAVENOUS
Status: DISCONTINUED | OUTPATIENT
Start: 2024-12-27 | End: 2024-12-27

## 2024-12-27 RX ORDER — DEXAMETHASONE SODIUM PHOSPHATE 4 MG/ML
INJECTION, SOLUTION INTRA-ARTICULAR; INTRALESIONAL; INTRAMUSCULAR; INTRAVENOUS; SOFT TISSUE
Status: DISCONTINUED | OUTPATIENT
Start: 2024-12-27 | End: 2024-12-27

## 2024-12-27 RX ORDER — SODIUM CHLORIDE 0.9 % (FLUSH) 0.9 %
10 SYRINGE (ML) INJECTION
Status: DISCONTINUED | OUTPATIENT
Start: 2024-12-27 | End: 2024-12-27 | Stop reason: HOSPADM

## 2024-12-27 RX ORDER — ONDANSETRON HYDROCHLORIDE 2 MG/ML
INJECTION, SOLUTION INTRAVENOUS
Status: DISCONTINUED | OUTPATIENT
Start: 2024-12-27 | End: 2024-12-27

## 2024-12-27 RX ORDER — ONDANSETRON HYDROCHLORIDE 2 MG/ML
4 INJECTION, SOLUTION INTRAVENOUS DAILY PRN
Status: DISCONTINUED | OUTPATIENT
Start: 2024-12-27 | End: 2024-12-27 | Stop reason: HOSPADM

## 2024-12-27 RX ORDER — PROPOFOL 10 MG/ML
VIAL (ML) INTRAVENOUS
Status: DISCONTINUED | OUTPATIENT
Start: 2024-12-27 | End: 2024-12-27

## 2024-12-27 RX ADMIN — PHENYLEPHRINE HYDROCHLORIDE 100 MCG: 10 INJECTION INTRAVENOUS at 07:12

## 2024-12-27 RX ADMIN — ONDANSETRON 4 MG: 2 INJECTION INTRAMUSCULAR; INTRAVENOUS at 07:12

## 2024-12-27 RX ADMIN — PHENYLEPHRINE HYDROCHLORIDE 200 MCG: 10 INJECTION INTRAVENOUS at 07:12

## 2024-12-27 RX ADMIN — SODIUM CHLORIDE, SODIUM LACTATE, POTASSIUM CHLORIDE, AND CALCIUM CHLORIDE: .6; .31; .03; .02 INJECTION, SOLUTION INTRAVENOUS at 07:12

## 2024-12-27 RX ADMIN — PROPOFOL 200 MG: 10 INJECTION, EMULSION INTRAVENOUS at 07:12

## 2024-12-27 RX ADMIN — SUGAMMADEX 400 MG: 100 INJECTION, SOLUTION INTRAVENOUS at 08:12

## 2024-12-27 RX ADMIN — LIDOCAINE HYDROCHLORIDE 100 MG: 20 INJECTION INTRAVENOUS at 07:12

## 2024-12-27 RX ADMIN — PROPOFOL 175 MCG/KG/MIN: 10 INJECTION, EMULSION INTRAVENOUS at 07:12

## 2024-12-27 RX ADMIN — DEXAMETHASONE SODIUM PHOSPHATE 4 MG: 4 INJECTION, SOLUTION INTRAMUSCULAR; INTRAVENOUS at 07:12

## 2024-12-27 RX ADMIN — FENTANYL CITRATE 100 MCG: 50 INJECTION, SOLUTION INTRAMUSCULAR; INTRAVENOUS at 07:12

## 2024-12-27 RX ADMIN — ROCURONIUM BROMIDE 50 MG: 10 INJECTION, SOLUTION INTRAVENOUS at 07:12

## 2024-12-27 NOTE — PLAN OF CARE
Patient states they are ready to be discharged. Instructions given to patient and family. Both verbalize understanding. Patient tolerating po liquids with no difficulty. Patient denies pain. Anesthesia consent and surgical consent in chart upon patient's discharge from [facility].

## 2024-12-27 NOTE — ANESTHESIA POSTPROCEDURE EVALUATION
Anesthesia Post Evaluation    Patient: Justus Shaikh    Procedure(s) Performed: Procedure(s) (LRB):  ROBOTIC BRONCHOSCOPY (N/A)  ENDOBRONCHIAL ULTRASOUND (EBUS) (N/A)    Final Anesthesia Type: general      Patient location during evaluation: [facility]  Patient participation: Yes- Able to Participate  Level of consciousness: awake and alert  Post-procedure vital signs: reviewed and stable  Pain management: adequate  Airway patency: patent    PONV status at discharge: No PONV  Anesthetic complications: no      Cardiovascular status: blood pressure returned to baseline  Respiratory status: unassisted  Hydration status: euvolemic  Follow-up not needed.            Vitals Value Taken Time   /74 12/27/24 0902   Temp 36.8 °C (98.2 °F) 12/27/24 0900   Pulse 76 12/27/24 0904   Resp 21 12/27/24 0904   SpO2 94 % 12/27/24 0904   Vitals shown include unfiled device data.      No case tracking events are documented in the log.      Pain/Maria G Score: Maria G Score: 9 (12/27/2024  8:30 AM)

## 2024-12-27 NOTE — ANESTHESIA PREPROCEDURE EVALUATION
12/27/2024  Justus Shaikh is a 64 y.o., male.      Pre-op Assessment    I have reviewed the Patient Summary Reports.     I have reviewed the Nursing Notes.    I have reviewed the Medications.     Review of Systems  Anesthesia Hx:  No problems with previous Anesthesia             Denies Family Hx of Anesthesia complications.     Cardiovascular:  Exercise tolerance: good   Hypertension                                          Pulmonary:  Pulmonary Normal                       Neurological:  Neurology Normal                                      Psych:  Psychiatric History            History reviewed. No pertinent past medical history.  Past Surgical History:   Procedure Laterality Date    HAND SURGERY Right     HERNIA REPAIR  1961    INJECTION OF ANESTHETIC AGENT AROUND MULTIPLE INTERCOSTAL NERVES Right 06/27/2022    Procedure: BLOCK, NERVE, INTERCOSTAL, 2 OR MORE;  Surgeon: Osvaldo Saeed MD;  Location: [facility] OR [address] [place] [address];  Service: Thoracic;  Laterality: Right;    JOINT REPLACEMENT  10/2023    Right knee replacement    KNEE SURGERY      PROSTATE SURGERY  3/2024    Removed Prostate cancer    ROBOTIC BRONCHOSCOPY N/A 05/06/2022    Procedure: ROBOTIC BRONCHOSCOPY with fluro  ;  Surgeon: Chava Mcdonald MD;  Location: [facility] OR [address] [place] [address];  Service: Pulmonary;  Laterality: N/A;    ROTATOR CUFF REPAIR Right     TONSILLECTOMY  1963    VASECTOMY  1998    XI ROBOTIC RATS,WITH LOBECTOMY,LUNG Right 06/27/2022    Procedure: XI ROBOTIC RATS,WITH Right Lower LOBECTOMY,LUNG;  Surgeon: Osvaldo Saeed MD;  Location: [facility] OR [address] [place] [address];  Service: Thoracic;  Laterality: Right;     Patient Active Problem List   Diagnosis    Essential hypertension    Hyperlipidemia, mixed    Elevated PSA    Benign prostatic hyperplasia    Decreased libido    Anxiety    Lung mass    Carcinoid tumor of right lung    Malignant  neoplasm of lower lobe of right lung    Impaired fasting glucose    Aortic atherosclerosis     No current facility-administered medications on file as of 12/27/2024.     Outpatient Medications as of 12/27/2024   Medication Sig Dispense Refill    fluticasone propionate (FLONASE) 50 mcg/actuation nasal spray 2 sprays by Each Nostril route once daily.      irbesartan-hydrochlorothiazide (AVALIDE) 300-12.5 mg per tablet TAKE 1 TABLET EVERY DAY 90 tablet 3    rosuvastatin (CRESTOR) 20 MG tablet TAKE 1 TABLET EVERY OTHER DAY 45 tablet 2    albuterol (PROVENTIL) 2.5 mg /3 mL (0.083 %) nebulizer solution Take 3 mLs (2.5 mg total) by nebulization 3 (three) times daily as needed for Wheezing or Shortness of Breath. 180 each 2    aspirin (ECOTRIN) 81 MG EC tablet Take 81 mg by mouth once daily.      COLLAGEN MISC by Misc.(Non-Drug; Combo Route) route.      esomeprazole magnesium (NEXIUM 24HR ORAL) Take by mouth.      folic acid/multivit-min/lutein (CENTRUM SILVER ORAL) Take by mouth.      hydroCHLOROthiazide (MICROZIDE) 12.5 mg capsule Take 1 capsule (12.5 mg total) by mouth daily as needed (edema). 30 capsule 3    ibuprofen (ADVIL,MOTRIN) 200 MG tablet Take 600 mg by mouth every 6 (six) hours as needed for Pain.      LORazepam (ATIVAN) 1 MG tablet TAKE 1 TABLET EVERY DAY AS NEEDED FOR ANXIETY 30 tablet 2    minoxidiL (LONITEN) 2.5 MG tablet Take 5 mg by mouth nightly.      omega 3-dha-epa-fish oil 1,200 (144-216) mg Cap Take by mouth.      tadalafiL (CIALIS) 5 MG tablet Take 5 mg by mouth daily as needed for Erectile Dysfunction.      VIOS AEROSOL DELIVERY SYSTEM Georgette Take by nebulization as needed.        Review of patient's allergies indicates:   Allergen Reactions    Sudafed cold-allergy Palpitations        Physical Exam  General: Well nourished, Cooperative, Alert and Oriented    Airway:  Mallampati: II   Mouth Opening: Normal  TM Distance: Normal  Tongue: Normal  Neck ROM: Normal  ROM    Chest/Lungs:  Normal Respiratory Rate    Heart:  Rate: Normal    Wt Readings from Last 3 Encounters:   12/27/24 95.3 kg (210 lb)   12/10/24 99.6 kg (219 lb 9.3 oz)   11/13/24 98.1 kg (216 lb 4.3 oz)     Temp Readings from Last 3 Encounters:   12/27/24 37.1 °C (98.7 °F) (Oral)   07/27/22 37.2 °C (98.9 °F) (Oral)   06/28/22 36.7 °C (98.1 °F) (Oral)     BP Readings from Last 3 Encounters:   12/27/24 (!) 166/93   12/10/24 136/80   11/13/24 (!) 154/95     Pulse Readings from Last 3 Encounters:   12/27/24 81   12/10/24 86   11/13/24 82     Lab Results   Component Value Date    WBC 5.88 11/19/2024    HGB 14.8 11/19/2024    HCT 42.6 11/19/2024    MCV 87 11/19/2024     11/19/2024         Chemistry        Component Value Date/Time     11/19/2024 0949    K 4.1 11/19/2024 0949     11/19/2024 0949    CO2 26 11/19/2024 0949    BUN 15 11/19/2024 0949    BUN 10.0 11/21/2023 0811    CREATININE 0.8 11/19/2024 0949     11/19/2024 0949        Component Value Date/Time    CALCIUM 9.5 11/19/2024 0949    ALKPHOS 84 11/19/2024 0949    AST 22 11/19/2024 0949    ALT 22 11/19/2024 0949    BILITOT 1.5 (H) 11/19/2024 0949        Results for orders placed or performed in visit on 12/10/24   IN OFFICE EKG 12-LEAD (to Guy)    Collection Time: 12/10/24  1:59 PM   Result Value Ref Range    QRS Duration 88 ms    [facility] QTC Calculation 454 ms    Narrative    Test Reason : Z01.818,    Vent. Rate :  87 BPM     Atrial Rate :    BPM     P-R Int :    ms          QRS Dur :  88 ms      QT Int : 378 ms       P-R-T Axes :    -15 110 degrees    QTcB Int : 454 ms    Accelerated Junctional rhythm  Low voltage QRS  Septal infarct ,age undetermined  Cannot rule out Inferior infarct ,age undetermined  Abnormal ECG  When compared with ECG of 26-Sep-2023 09:51,  Junctional rhythm has replaced Sinus rhythm  Minimal criteria for Inferior infarct are now Present  Nonspecific T wave abnormality now evident in Inferior leads  Confirmed by  Maxx Powers ([id number]) on 12/18/2024 4:27:48 PM    Referred By:            Confirmed By: Maxx Powers      Echo 2022  Summary    The ECG portion of this study is negative for myocardial ischemia.  During stress, the following significant arrhythmias were observed: rare PACs, occasional PVCs,. A single 4 beat run of VT occured at peak exercise.  The patient's exercise capacity was above average. He stopped exercisig due to knee pain.  Moderate left atrial enlargement.  The left ventricle is normal in size with normal systolic function.  The estimated ejection fraction is 55%.  Indeterminate left ventricular diastolic function.  Normal right ventricular size with normal right ventricular systolic function.  Structurally normal valves.  Normal central venous pressure (3 mmHg).  The estimated PA systolic pressure is 25 mmHg.  The stress echo portion of this study is negative for myocardial ischemia.    Anesthesia Plan  Type of Anesthesia, risks & benefits discussed:    Anesthesia Type: Gen Natural Airway, Gen Supraglottic Airway, Gen ETT  Intra-op Monitoring Plan: Standard ASA Monitors  Post Op Pain Control Plan: multimodal analgesia and IV/PO Opioids PRN  Induction:  IV  Informed Consent: Informed consent signed with the Patient and all parties understand the risks and agree with anesthesia plan.  All questions answered.   ASA Score: 3  Day of Surgery Review of History & Physical: H&P Update referred to the surgeon/provider.    Ready For Surgery From Anesthesia Perspective.     .

## 2024-12-27 NOTE — ANESTHESIA PROCEDURE NOTES
Intubation    Date/Time: 12/27/2024 7:10 AM    Performed by: Sánchez Daniels CRNA  Authorized by: Madonna Richey MD    Intubation:     Induction:  Intravenous    Intubated:  Postinduction    Mask Ventilation:  Easy mask    Attempts:  1    Attempted By:  CRNA    Method of Intubation:  Video laryngoscopy    Blade:  Sharif 4    Laryngeal View Grade: Grade I - full view of cords      Difficult Airway Encountered?: No      Complications:  None    Airway Device:  Oral endotracheal tube    Airway Device Size:  9.0    Style/Cuff Inflation:  Cuffed (inflated to minimal occlusive pressure)    Placement Verified By:  Capnometry    Complicating Factors:  None    Findings Post-Intubation:  BS equal bilateral and atraumatic/condition of teeth unchanged

## 2024-12-27 NOTE — PROGRESS NOTES
Wedding left on pt. Multiple attempts to get off with soap and lotion, was unsuccessful. MD Lisa, Anesthesia team, and IVON Arthur notified. Okay to leave on.

## 2024-12-27 NOTE — DISCHARGE SUMMARY
[facility] (2nd Fl)  Discharge Note  Short Stay    Procedure(s) (LRB):  ROBOTIC BRONCHOSCOPY (N/A)  ENDOBRONCHIAL ULTRASOUND (EBUS) (N/A)      OUTCOME: Patient tolerated treatment/procedure well without complication and is now ready for discharge.    DISPOSITION: Home or Self Care    FINAL DIAGNOSIS:  Pulmonary nodule RML. History of typical carcinoid s/p RLL lobectomy.     FOLLOWUP: In clinic    DISCHARGE INSTRUCTIONS: Written and verbal instructions provided.     TIME SPENT ON DISCHARGE: 20 minutes

## 2024-12-30 LAB
ACID FAST MOD KINY STN SPEC: NORMAL
BACTERIA SPEC AEROBE CULT: NO GROWTH
FUNGUS SPEC CULT: NORMAL
MYCOBACTERIUM SPEC QL CULT: NORMAL

## 2025-01-03 LAB — BACTERIA SPEC ANAEROBE CULT: NORMAL

## 2025-01-06 ENCOUNTER — TELEPHONE (OUTPATIENT)
Dept: PULMONOLOGY | Facility: CLINIC | Age: 65
End: 2025-01-06
Payer: MEDICARE

## 2025-01-06 DIAGNOSIS — R91.1 PULMONARY NODULE: Primary | ICD-10-CM

## 2025-01-06 LAB
ADEQUACY: NORMAL
COMMENT: NORMAL
FINAL PATHOLOGIC DIAGNOSIS: NORMAL
Lab: NORMAL
MICROSCOPIC EXAM: NORMAL

## 2025-01-06 NOTE — TELEPHONE ENCOUNTER
----- Message from Chava Mcdonald MD sent at 1/6/2025  1:44 PM CST -----  Hey,     I need to see him in clinic in 3 months with repeat CT .. I placed orders. Thanks     D

## 2025-01-06 NOTE — PROGRESS NOTES
Biopsy results reviewed- + Non-necrotizing granulomas.. No E/O malignancy. Stains for fungal/AFB negative. Cultures pending.   Plan for repeat Ct chest in 3 months. Await cultures to finalize.   Discussed with him by phone.   Messaged thoracic team to inform of results as well.     Chava Mcdonald MD   [facility]

## 2025-01-29 DIAGNOSIS — I10 ESSENTIAL HYPERTENSION: ICD-10-CM

## 2025-01-29 RX ORDER — IRBESARTAN AND HYDROCHLOROTHIAZIDE 300; 12.5 MG/1; MG/1
1 TABLET, FILM COATED ORAL
Qty: 90 TABLET | Refills: 3 | Status: SHIPPED | OUTPATIENT
Start: 2025-01-29

## 2025-01-29 NOTE — TELEPHONE ENCOUNTER
Refill Decision Note   Justus Hawa  is requesting a refill authorization.  Brief Assessment and Rationale for Refill:  Approve     Medication Therapy Plan:         Alert overridden per protocol: Yes   Comments:     Note composed:4:29 AM 01/29/2025

## 2025-01-29 NOTE — TELEPHONE ENCOUNTER
No care due was identified.  [facility] Embedded Care Due Messages. Reference number: [id number].   1/29/2025 1:36:54 AM CST

## 2025-01-30 ENCOUNTER — TELEPHONE (OUTPATIENT)
Dept: PULMONOLOGY | Facility: CLINIC | Age: 65
End: 2025-01-30
Payer: MEDICARE

## 2025-02-06 ENCOUNTER — TELEPHONE (OUTPATIENT)
Dept: PRIMARY CARE CLINIC | Facility: CLINIC | Age: 65
End: 2025-02-06
Payer: MEDICARE

## 2025-02-06 NOTE — TELEPHONE ENCOUNTER
----- Message from Kristi sent at 2/6/2025 10:25 AM CST -----  Contact: [phone] Patient  .[email or web address]     Patient is calling for Medical Advice regarding: Pt states unsure if PCP needs to see him every 6 months or just yearly. IF needed in June 2025 (week of 6/23 - unavailable).     How long has patient had these symptoms:    Pharmacy name and phone#:    Patient wants a call back or thru [facility]: call back    Comments:    Please advise patient replies from provider may take up to 48 hours.

## 2025-02-10 ENCOUNTER — TELEPHONE (OUTPATIENT)
Dept: PRIMARY CARE CLINIC | Facility: CLINIC | Age: 65
End: 2025-02-10
Payer: MEDICARE

## 2025-02-10 NOTE — TELEPHONE ENCOUNTER
----- Message from Darlene sent at 2/10/2025 11:09 AM CST -----  Contact: [phone]  Patient is returning a phone call.  Who left a message for the patient: n/a   Does patient know what this is regarding:    Would you like a call back, or a response through your [facility] portal?:   call back   Comments:        Pt miss call

## 2025-03-12 ENCOUNTER — PATIENT MESSAGE (OUTPATIENT)
Dept: PULMONOLOGY | Facility: CLINIC | Age: 65
End: 2025-03-12
Payer: MEDICARE

## 2025-03-15 ENCOUNTER — PATIENT MESSAGE (OUTPATIENT)
Dept: PRIMARY CARE CLINIC | Facility: CLINIC | Age: 65
End: 2025-03-15
Payer: MEDICARE

## 2025-03-15 DIAGNOSIS — C34.31 MALIGNANT NEOPLASM OF LOWER LOBE OF RIGHT LUNG: Primary | ICD-10-CM

## 2025-03-17 RX ORDER — RESPIRATORY SYNCYTIAL VISUS VACCINE RECOMBINANT, ADJUVANTED 120MCG/0.5
0.5 KIT INTRAMUSCULAR ONCE
Qty: 0.5 ML | Refills: 0 | Status: SHIPPED | OUTPATIENT
Start: 2025-03-17 | End: 2025-03-17

## 2025-03-17 NOTE — TELEPHONE ENCOUNTER
LOV with Isidro Worthington MD , 12/10/2024    Please see patient message  Inquiring about the need for RSV vaccination due to grandchild being born soon   Walk in

## 2025-03-19 ENCOUNTER — PATIENT MESSAGE (OUTPATIENT)
Dept: PRIMARY CARE CLINIC | Facility: CLINIC | Age: 65
End: 2025-03-19
Payer: MEDICARE

## 2025-04-28 ENCOUNTER — OFFICE VISIT (OUTPATIENT)
Dept: PULMONOLOGY | Facility: CLINIC | Age: 65
End: 2025-04-28
Payer: MEDICARE

## 2025-04-28 ENCOUNTER — PATIENT MESSAGE (OUTPATIENT)
Dept: CARDIOTHORACIC SURGERY | Facility: CLINIC | Age: 65
End: 2025-04-28
Payer: MEDICARE

## 2025-04-28 ENCOUNTER — HOSPITAL ENCOUNTER (OUTPATIENT)
Dept: RADIOLOGY | Facility: HOSPITAL | Age: 65
Discharge: HOME | End: 2025-04-28
Attending: EMERGENCY MEDICINE
Payer: MEDICARE

## 2025-04-28 VITALS
BODY MASS INDEX: 32.88 KG/M2 | HEART RATE: 76 BPM | DIASTOLIC BLOOD PRESSURE: 88 MMHG | HEIGHT: 68 IN | SYSTOLIC BLOOD PRESSURE: 132 MMHG | WEIGHT: 216.94 LBS | OXYGEN SATURATION: 96 %

## 2025-04-28 DIAGNOSIS — R91.1 PULMONARY NODULE 1 CM OR GREATER IN DIAMETER: Primary | ICD-10-CM

## 2025-04-28 DIAGNOSIS — D3A.090 CARCINOID TUMOR OF LUNG, UNSPECIFIED WHETHER MALIGNANT: Primary | ICD-10-CM

## 2025-04-28 DIAGNOSIS — R91.1 PULMONARY NODULE: ICD-10-CM

## 2025-04-28 DIAGNOSIS — A31.0 MAI (MYCOBACTERIUM AVIUM-INTRACELLULARE): ICD-10-CM

## 2025-04-28 DIAGNOSIS — C34.31 MALIGNANT NEOPLASM OF LOWER LOBE OF RIGHT LUNG: ICD-10-CM

## 2025-04-28 PROCEDURE — 1159F MED LIST DOCD IN RCRD: CPT | Mod: CPTII,S$GLB,, | Performed by: EMERGENCY MEDICINE

## 2025-04-28 PROCEDURE — 3288F FALL RISK ASSESSMENT DOCD: CPT | Mod: CPTII,S$GLB,, | Performed by: EMERGENCY MEDICINE

## 2025-04-28 PROCEDURE — 3079F DIAST BP 80-89 MM HG: CPT | Mod: CPTII,S$GLB,, | Performed by: EMERGENCY MEDICINE

## 2025-04-28 PROCEDURE — 3075F SYST BP GE 130 - 139MM HG: CPT | Mod: CPTII,S$GLB,, | Performed by: EMERGENCY MEDICINE

## 2025-04-28 PROCEDURE — 1160F RVW MEDS BY RX/DR IN RCRD: CPT | Mod: CPTII,S$GLB,, | Performed by: EMERGENCY MEDICINE

## 2025-04-28 PROCEDURE — 71250 CT THORAX DX C-: CPT | Mod: 26,,, | Performed by: STUDENT IN AN ORGANIZED HEALTH CARE EDUCATION/TRAINING PROGRAM

## 2025-04-28 PROCEDURE — 99999 PR PBB SHADOW E&M-EST. PATIENT-LVL III: CPT | Mod: PBBFAC,,, | Performed by: EMERGENCY MEDICINE

## 2025-04-28 PROCEDURE — 71250 CT THORAX DX C-: CPT | Mod: TC

## 2025-04-28 PROCEDURE — 1101F PT FALLS ASSESS-DOCD LE1/YR: CPT | Mod: CPTII,S$GLB,, | Performed by: EMERGENCY MEDICINE

## 2025-04-28 PROCEDURE — 99214 OFFICE O/P EST MOD 30 MIN: CPT | Mod: S$GLB,,, | Performed by: EMERGENCY MEDICINE

## 2025-04-28 PROCEDURE — 3008F BODY MASS INDEX DOCD: CPT | Mod: CPTII,S$GLB,, | Performed by: EMERGENCY MEDICINE

## 2025-04-28 NOTE — PROGRESS NOTES
Pulmonary & Critical Care Medicine        HPI: Referral from Dr. Joe Ibarra for robotic bronchoscopy- This is a 61 y.o. male who is being seen in pulmonary clinic for evaluation of pulmonary nodule on CT chest.  This is  a new issue.  He presents with his wife, Nehal Shaikh. He had incidental COVID-19 December 2020.     A lung nodule was found on CT calcium scoring to eval for atherosclerotic disease with dedicated CT chest with contrast on 3/3/22 revealing an infrahilar nodule 2.5 cm with distal airway opacification.     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     His medical history includes hypertension and joint pains.  He denies dyspnea at rest or with exertion. No cough, no chest pain or tightness.   Has seasonal allergies and cough related to it. Cough is seasonal and not always persistent. Denies any weight loss. He reports frequently sweating easily compared to those around him and has tendency to have a flushed face and neck.      Never  had PFTs in the past.        Prior Visit: No significant cough/sputum production, + post nasal drip. No constitutional features. No exercise intolerance. No additional complaints. Wife does endorse occasional wheezing.   PFTs look great.         Additional Pulmonary History:   Occupational/Environmental Exposures:ran [employer]. Mostly in office.. Worked with  as summer jobs. + asbestos.. 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     Exposure to Animals/Pets: None. Had dogs in past   From [facility]. Lives in [place]..   Travel History: 2019-- [place]. 2018- [place]   History of exposures to TB: none   Family History of Lung Cancer: none   Childhood history of Lung Disease:asthma as child.   Chest surgery/Trauma- None   Aspiration events- none   IS- none   Anti-Plt/OAC-baby ASA + daily motrin.    Tobacco use- Cigars occ.   Recurrent PNA- None   Anesthesia complications- None   Sleep- Never  formally diagnoses. Snores.         Prior Visit:       -- Underwent robotic bronchoscopy 5/2022 with me:   -- Pathology- typical carcinoid.   -- 6/27/22: right robotic assisted lower lobectomy with MLND     Pain never an issue and healing well healed from surgery. Main complaint is ongoing cough post operatively. Cough was constant, worse with deep inspiration and cool air. Occasionally productive of white sputum. Feels tickle in back of throat/PND- Seen by several providers including ENT/Allergy- Completed courses of abx + corticosteroids. Most recently seen by PCP- now on advair (had childhood asthma) + PPi. No nasal corticosteroids. Prior attempt at H1 antagonist, but now off. Contracted COVID about 1 week prior. Very minimal symptoms.   Over the last several days he has improved symptoms.  Still with occasional cough, but much improved from where it has been. No F/C/NS or systemic features. Post op CT pending.      PRIOR VISIT:   Doing well. Cough much improved. Still intermittent, but not really interfering with life. Still with concern about bulging near surgical incision site.. Had CT A/P but no abnormality identified. More active and increasing exercise.   Remains on laba/ICS and prn albuterol.. Does not require daily.   Did have COVID near onset of cough initially.   No additional complaints.   Repeat CT chest and follow up with thoracic pending.      Prior Visit-     Underwent robotic 2022- Typical carcinoid-  presents to clinic for 2 year follow up s/p right robotic assisted lower lobectomy with MLND for typical carcinoid of right lower lobe. Uncomplicated post-operative course. Here today with updated chest CT. Today patient reports he is doing well. Reports new diagnosis of prostate cancer s/p prostatectomy 03/[facility]24 at [facility] as well as recent R TKR. Otherwise doing well.      New RML nodule on surveillance imaging.. here for robotic. + Airway sign.   No OAC/Anti-PLT.   No GLP      Interval  History-    Underwent robotic bronchoscopy RML nodule-  Final Pathologic Diagnosis 1. Lung, right middle lobe (aspiration and cellblock):  Negative for malignant cells  Predominantly blood.      2. Lung, right middle lobe (transbronchial biopsy):    Negative for malignant cells  Organizing pneumonia pattern of fibrosis.  Small non-necrotizing granulomata.  No fungus (GMS stain negative)  No acid-fast organisms (AFB stain negative)  No polarizable material    3. Lung, right middle lobe (bronchioalveolar lavage):  Negative for malignant cells  No fungus (GMS stain negative)       AFB + CHELSIE- Significance uncertain.     Here today for follow up and repeat 3 mth interval imaging.   From a respiratory standpoint- No changes.. Doing well.      No past medical history on file.            Past Surgical History:   Procedure Laterality Date    HAND SURGERY Right      KNEE SURGERY        ROTATOR CUFF REPAIR Right        Family/Social History: Reviewed and updated.         Drug Allergies:           Review of patient's allergies indicates:   Allergen Reactions    Pseudoephedrine hcl        Review of Systems:   A comprehensive 12-point review of systems was performed, and is negative except for those items mentioned above in the HPI section of this note.      VITALS: Reviewed      Physical Exam:   GEN- NAD AAOx3 Well Built, Well Appearing   HEENT- ATNC, PERRLA, EOMI, OP-Cl. No JVD, LAD or bruit noted. Trachea Midline.   CV- RRR No M/R/G  RESP- CTA-Bilateral- Chest wall incision sites C/D/I   GI- S/NT/ND. Positive BS X 4. No HSM Noted  BACK- Spine midline. No step off, crepitus or deformity noted. No midline TTP.   Ext- MAEW, No deformity. No edema or rashes noted.   Neuro- Strength 5/5 symmetric. CN 2-12 intact. Normal gait. Normal sensation.       Personal Review and Summary of Prior Diagnostics     Laboratory Studies: Reviewed        Latest Reference Range & Units Most Recent   WBC 3.90 - 12.70 K/uL 5.88  11/19/24 09:49   RBC  4.60 - 6.20 M/uL 4.88  11/19/24 09:49   Hemoglobin 14.0 - 18.0 g/dL 14.8  11/19/24 09:49   Hematocrit 40.0 - 54.0 % 42.6  11/19/24 09:49   MCV 82 - 98 fL 87  11/19/24 09:49   MCH 27.0 - 31.0 pg 30.3  11/19/24 09:49   MCHC 32.0 - 36.0 g/dL 34.7  11/19/24 09:49   RDW 11.5 - 14.5 % 12.1  11/19/24 09:49   Platelet Count 150 - 450 K/uL 234  11/19/24 09:49   MPV 9.2 - 12.9 fL 9.1 (L)  11/19/24 09:49   Gran % 38.0 - 73.0 % 65.5  11/19/24 09:49   Neutrophils Relative % 63.7  11/21/23 08:11   Lymph % 18.0 - 48.0 % 19.7  11/19/24 09:49   Lymphocytes % % 21.7  11/21/23 08:11   Mono % 4.0 - 15.0 % 11.1  11/19/24 09:49   Monocytes % 9.5  11/21/23 08:11   Eos % 0.0 - 8.0 % 2.6  11/19/24 09:49   Basophil % 0.0 - 1.9 % 0.9  11/19/24 09:49   Immature Granulocytes 0.0 - 0.5 % 0.2  11/19/24 09:49   Gran # (ANC) 1.8 - 7.7 K/uL 3.9  11/19/24 09:49   Neutrophils, Abs 1.80 - 8.00 Thousand/uL 3.40  11/21/23 08:11   Lymph # 1.0 - 4.8 K/uL 1.2  11/19/24 09:49   Lymphocytes Absolute 1.10 - 5.00 Thousand/uL 1.20  11/21/23 08:11   Mono # 0.3 - 1.0 K/uL 0.7  11/19/24 09:49   Eos # 0.0 - 0.5 K/uL 0.2  11/19/24 09:49   Eosinophils Absolute 0.00 - 0.60 Thousand/uL 0.20  11/21/23 08:11   Basophils Absolute 0.00 - 0.20 Thousand/uL 0.00  11/21/23 08:11   Baso # 0.00 - 0.20 K/uL 0.05  11/19/24 09:49   Immature Grans (Abs) 0.00 - 0.04 K/uL 0.01  11/19/24 09:49   nRBC 0 /100 WBC 0  11/19/24 09:49   Differential Type   Auto  6/4/21 08:28   Differential Method   Automated  11/19/24 09:49   PT 9.0 - 12.5 sec 11.1  12/10/24 15:12   INR 0.8 - 1.2  1.0  12/10/24 15:12   PTT 21.0 - 32.0 sec 31.6  12/10/24 15:12   Sodium 136 - 145 mmol/L 137  11/19/24 09:49   Potassium 3.5 - 5.1 mmol/L 4.1  11/19/24 09:49   Potassium 3.5 - 5.3 mmol/L 4.6 (E)  2/2/24 10:35   Chloride 95 - 110 mmol/L 104  11/19/24 09:49   CO2 23 - 29 mmol/L 26  11/19/24 09:49   Anion Gap 8 - 16 mmol/L 7 (L)  11/19/24 09:49   BUN 8 - 23 mg/dL 15  11/19/24 09:49   BUN 7.0 - 25.0 mg/dL 10.0  11/21/23  08:11   Creatinine 0.5 - 1.4 mg/dL 0.8  11/19/24 09:49   BUN/CREAT RATIO 6 - 22 (calc) SEE NOTE: (E)  2/2/24 10:35   eGFR >60 mL/min/1.73 m^2 >60.0  11/19/24 09:49   eGFR > OR = 60 mL/min/1.73m2 100 (E)  2/2/24 10:35   GFR >=60 mL/min 92  6/3/22 08:43   Glucose 70 - 110 mg/dL 108  11/19/24 09:49   Calcium 8.7 - 10.5 mg/dL 9.5  11/19/24 09:49   ALP 40 - 150 U/L 84  11/19/24 09:49   PROTEIN TOTAL 6.0 - 8.4 g/dL 7.2  11/19/24 09:49   Total Protein 6.0 - 8.0 g/dL 5.9 (L)  11/21/23 08:11   Albumin 3.5 - 5.2 g/dL 4.2  11/19/24 09:49   BILIRUBIN TOTAL 0.1 - 1.0 mg/dL 1.5 (H)  11/19/24 09:49   AST 10 - 40 U/L 22  11/19/24 09:49   ALT 10 - 44 U/L 22  11/19/24 09:49   Calc Osmolality 275 - 295 mOsm/kg 270 (L)  6/16/23 07:46   Cholesterol Total 120 - 199 mg/dL 184  11/19/24 09:49   HDL 40 - 75 mg/dL 60  11/19/24 09:49   HDL/Cholesterol Ratio 20.0 - 50.0 % 32.6  11/19/24 09:49   CHOL/HDLC Ratio 0.00 - 5.00  3.14  11/21/23 08:11   LDL Calculated <130 mg/dL 79  11/21/23 08:11   Non-HDL Cholesterol mg/dL 124  11/19/24 09:49   Total Cholesterol/HDL Ratio 2.0 - 5.0  3.1  11/19/24 09:49   Triglycerides 30 - 150 mg/dL 164 (H)  11/19/24 09:49   LDL Cholesterol 63.0 - 159.0 mg/dL 91.2  11/19/24 09:49   Hemoglobin A1C External 4.0 - 5.6 % 5.3  11/19/24 09:49   Estimated Avg Glucose 68 - 131 mg/dL 105  11/19/24 09:49   TSH 0.35 - 4.00 uIL/mL 2.44  6/4/21 08:28   Free T4 0.9 - 1.7 Nanogram/dL 1.1  6/4/21 08:28   Total PSA < OR = 4.0 ng/mL 6.4 (H)  11/21/23 08:11   Free PSA ng/mL 0.6  11/21/23 08:11   % Free PSA >25 % (calc) 9 (L)  11/21/23 08:11   Testosterone Total 250 - 1,100 ng/dL 313  12/8/22 08:23   A. alternata IgE <0.10 kU/L <0.10  7/19/22 10:58   Altern. alternata Class   CLASS 0  7/19/22 10:58   Aspergillus Fumigatus IgE <0.10 kU/L <0.10  7/19/22 10:58   A. fumigatus Class   CLASS 0  7/19/22 10:58   Bahia Grass IgE <0.10 kU/L 0.43 (H)  7/19/22 10:58   Bahia Class   CLASS 1  7/19/22 10:58   Bald [place] Class   CLASS 0  7/19/22  10:58   Bermuda Grass IgE <0.10 kU/L <0.10  7/19/22 10:58   [place]uda Grass Class   CLASS 0  7/19/22 10:58   Curvularia lunata <0.10 kU/L <0.10  7/19/22 10:58   Curvularia Lunata Class   CLASS 0  7/19/22 10:58   Cat Dander IgE <0.10 kU/L 0.37 (H)  7/19/22 10:58   Cat Epithelium Class   CLASS 1  7/19/22 10:58   Chaetomium <0.10 kU/L <0.10  7/19/22 10:58   Chaetomium Glob. Class   CLASS 0  7/19/22 10:58   Cladosporium Class   CLASS 0  7/19/22 10:58   Cladosporium IgE <0.10 kU/L <0.10  7/19/22 10:58   Cockroach, IgE <0.10 kU/L  -  0.46 (H)  7/19/22 10:58  CLASS 1  7/19/22 10:58   Gilbert Tree IgE <0.10 kU/L <0.10  7/19/22 10:58   Gilbert Class   CLASS 0  7/19/22 10:58   Luther <0.10 kU/L <0.10  7/19/22 10:58   D. farinae <0.10 kU/L 0.73 (H)  7/19/22 10:58   D. farinae Class   CLASS 2  7/19/22 10:58   D. pteronyssinus Dust Mite IgE <0.10 kU/L 0.57 (H)  7/19/22 10:58   D. pteronyssinus Class   CLASS 1  7/19/22 10:58   Dog Dander IgE <0.10 kU/L <0.10  7/19/22 10:58   Dog Dander Class   CLASS 0  7/19/22 10:58   English Plantain IgE <0.10 kU/L <0.10  7/19/22 10:58   English Plantain Class   CLASS 0  7/19/22 10:58   Helminthosporium Class   CLASS 0  7/19/22 10:58   Helminthosporium Halodes IgE <0.10 kU/L <0.10  7/19/22 10:58   Horse Dander IgE <0.10 kU/L <0.10  7/19/22 10:58   Horse Dander Class   CLASS 0  7/19/22 10:58   Flako Grass IgE <0.10 kU/L 0.16 (H)  7/19/22 10:58   Flako Grass Class   CLASS 0/1  7/19/22 10:58   Maple/Mission IgE <0.10 kU/L <0.10  7/19/22 10:58   Maple/Mission Class   CLASS 0  7/19/22 10:58   Marshelder IgE <0.10 kU/L <0.10  7/19/22 10:58   Marshelder Class   CLASS 0  7/19/22 10:58   MUGWORT <0.10 kU/L <0.10  7/19/22 10:58   Mugwort Class   CLASS 0  7/19/22 10:58   Oak, Class   CLASS 2  7/19/22 10:58   Pecan [place] Tree IgE <0.10 kU/L <0.10  7/19/22 10:58   Pecan, Class   CLASS 0  7/19/22 10:58   Penicillium IgE <0.10 kU/L <0.10  7/19/22 10:58   Penicillium Class   CLASS 0  7/19/22 10:58    Ragweed, Short, Class   CLASS 0  7/19/22 10:58   Ragweed, Short, IgE <0.10 kU/L <0.10  7/19/22 10:58   Ragweed, Western IgE <0.10 kU/L <0.10  7/19/22 10:58   Ragweed, Western, Class   CLASS 0  7/19/22 10:58   Russian Thistle IgE <0.10 kU/L <0.10  7/19/22 10:58   Russian Thistle Class   CLASS 0  7/19/22 10:58   Silver Birch Tree IgE <0.10 kU/L 0.73 (H)  7/19/22 10:58   Silver Birch Class   CLASS 2  7/19/22 10:58   Justus Grass IgE <0.10 kU/L 0.82 (H)  7/19/22 10:58   Justus Grass Class   CLASS 2  7/19/22 10:58   [place] Tree IgE <0.10 kU/L <0.10  7/19/22 10:58   [place] Tree Class   CLASS 0  7/19/22 10:58   Petrified [place] Natl Pk Tree IgE <0.10 kU/L 1.05 (H)  7/19/22 10:58   Vance Tree IgE <0.10 kU/L <0.10  7/19/22 10:58   Vance Class   CLASS 0  7/19/22 10:58   [place], IgE <0.10 kU/L <0.10  7/19/22 10:58   [place] Class   CLASS 0  7/19/22 10:58                Microbiology Data: Reviewed     Mycobacteria culture ID + Susc, referred FINAL 03/13/2025 1133 Abnormal    Comment: SOURCE: LUNG, PLF, Lung, RML - Mycobacterium species  MYCOBACTERIA CULTURE REFER ID+SUSC                     FINAL  MYCOBACTERIUM AVIUM COMPLEX    Per CLSI M24 Guidelines, MAXINE data for ethambutol,  rifampin,and rifabutin have shown poor correlation  with clinical response. Therefore, although these  drugs are in the recommended treatment regimen,  breakpoints for these agents that separate  susceptible from resistant strains cannot be  determined and these drugs will not be reported.  A high or a low MAXINE value provided by a laboratory  should not be used to determine whether these  drugs are useful in treatment regimens and we will  not test forward to other institutions for testing  of these agents because it is contrary to current  guidance-based therapy recommendations.  There are no established interpretive guidelines for agents  reported without interpretations.  ----------------------------------------------------------  Organism      MYCOBACTERIUM AVIUM COMPLEX  Antibiotic                   MAXINE (mcg/mL)  Interpretation  ----------------------------------------------------------  Clofazimine                          0.12  Moxifloxacin                            1       S  Clarithromycin                          2       S  Amikacin (IV)                          64       R  Amikacin (liposomal, inhaled)          64       S  Linezolid                             >32       R  Moxifloxacin: The in vivo effectiveness of this agent for  MAC disease is unproven.  Clarithromycin: Clarithromycin is the class drug for  macrolides and the only macrolide that needs to be tested.  Linezolid: The in vivo effectiveness of this agent for MAC  disease is unproven.     Fungal/bacterial- Negative      Summary of Chest Imaging Personally Reviewed:      CT CHEST 12/19-   Lungs and pleura: Postoperative changes of right lower lobectomy. Surgical margin appears similar to prior. Grossly stable 2.7 x 1.9 cm irregularly marginated nodular like abnormality in the right middle lobe (image 3 1 1 sequence 2). 2 mm nodule in the right lower lobe, unchanged (image 302 sequence 2). Bandlike atelectasis/fibrous scars are noted in the paravertebral right lower lobe, unchanged.           NM PET 11/27  1. Similar appearing right middle lobe nodular opacity noting mild radiotracer uptake             CT Chest 3/2022- 1. Heterogeneously enhancing infrahilar noncalcified nodule measuring up to 2.5 cm in size.  Findings are suspicious for neoplasm including carcinoid with some postobstructive changes seen within the right lower lobe inferior to this region.      NM PET 3/31- Non FDG avid infrahilar right lower lobe pulmonary nodule with associated bronchial obstruction.  Lack of significant FDG avidity and endobronchial association suggest potential carcinoid tumor     CXR 6/2022- Heart size is normal.  The osseous structures show scoliosis and degenerative change.  The lung fields are  clear.  There is mild blunting of the right costophrenic angle which may reflect a small amount of pleural fluid or pleural thickening.     2D Echo: None      PFT's:      Pre lobe   FEV1/FVC- 70   FEV1- 2.91L (87%)   FVC- 4.14L (95%)   TLC- 93   RV- 96   DLCO- 98      Pre Lobe 6MWT-   5/19/2022---------Distance: 450.19 meters (1477 feet)       O2 Sat % Supplemental Oxygen Heart Rate Blood Pressure Shaheen Scale   Pre-exercise  (Resting) 96 % Room Air 79 bpm 141/85 mmHg 0   During Exercise 95 % Room Air 95 bpm 156/88 mmHg 1   Post-exercise  (Recovery) 96 % Room Air  88 bpm   mmHg        Recovery Time: 89 seconds          Assessment:     No diagnosis found.     Encounter Medications[1]  No orders of the defined types were placed in this encounter.      Plan:       1. Typical carcinoid- s/p robotic assisted RLL lobectomy  with MLND 6/27- Uneventful post operative course. ARIAN   2. RML PULMONARY nodule- + Airway sign. Mild tracer avid on NM PET. Asymptomatic from a pulmonary standpoint. Underwent robotic bronchoscopy- Fibrosis + OP and non-necrotizing granulomas.. Looks very inflammatory-- On repeat CT chest today the nodule is resolving.. Would plan to continue surveillance imaging with thoracic.. Next CT 6 months. Sent message to Dr. Saeed.    3. CHELSIE- Likely colonization.. Resolving nodule and no additional evidence of disease to support active infection.       Discussed with patient and wife in detail. Can maintain follow up with thoracic. RTC prn..     Chava Mcdonald MD          [1]   Outpatient Encounter Medications as of 4/28/2025   Medication Sig Dispense Refill    albuterol (PROVENTIL) 2.5 mg /3 mL (0.083 %) nebulizer solution Take 3 mLs (2.5 mg total) by nebulization 3 (three) times daily as needed for Wheezing or Shortness of Breath. 180 each 2    aspirin (ECOTRIN) 81 MG EC tablet Take 81 mg by mouth once daily.      COLLAGEN MISC by Misc.(Non-Drug; Combo Route) route.      esomeprazole magnesium (NEXIUM  24HR ORAL) Take by mouth.      fluticasone propionate (FLONASE) 50 mcg/actuation nasal spray 2 sprays by Each Nostril route once daily.      folic acid/multivit-min/lutein (CENTRUM SILVER ORAL) Take by mouth.      hydroCHLOROthiazide (MICROZIDE) 12.5 mg capsule Take 1 capsule (12.5 mg total) by mouth daily as needed (edema). 30 capsule 3    ibuprofen (ADVIL,MOTRIN) 200 MG tablet Take 600 mg by mouth every 6 (six) hours as needed for Pain.      irbesartan-hydrochlorothiazide (AVALIDE) 300-12.5 mg per tablet TAKE 1 TABLET EVERY DAY 90 tablet 3    LORazepam (ATIVAN) 1 MG tablet TAKE 1 TABLET EVERY DAY AS NEEDED FOR ANXIETY 30 tablet 2    minoxidiL (LONITEN) 2.5 MG tablet Take 5 mg by mouth nightly.      omega 3-dha-epa-fish oil 1,200 (144-216) mg Cap Take by mouth.      rosuvastatin (CRESTOR) 20 MG tablet TAKE 1 TABLET EVERY OTHER DAY 45 tablet 3    tadalafiL (CIALIS) 5 MG tablet Take 5 mg by mouth daily as needed for Erectile Dysfunction.      VIOS AEROSOL DELIVERY SYSTEM Georgette Take by nebulization as needed.       No facility-administered encounter medications on file as of 4/28/2025.

## 2025-05-22 ENCOUNTER — PATIENT MESSAGE (OUTPATIENT)
Dept: PULMONOLOGY | Facility: CLINIC | Age: 65
End: 2025-05-22
Payer: MEDICARE

## 2025-06-17 ENCOUNTER — TELEPHONE (OUTPATIENT)
Dept: PHARMACY | Facility: CLINIC | Age: 65
End: 2025-06-17
Payer: MEDICARE

## 2025-06-17 NOTE — TELEPHONE ENCOUNTER
[facility]/Population Health Chart Review & Patient Outreach Details For Medication Adherence Project    Reason for Outreach Encounter: 3rd Party payor non-compliance report ([employer], [employer], C, etc)  2.  Patient Outreach Method: Reviewed patient chart   3.   Medication in question:    Hypertension Medications              hydroCHLOROthiazide (MICROZIDE) 12.5 mg capsule Take 1 capsule (12.5 mg total) by mouth daily as needed (edema).    irbesartan-hydrochlorothiazide (AVALIDE) 300-12.5 mg per tablet TAKE 1 TABLET EVERY DAY    minoxidiL (LONITEN) 2.5 MG tablet Take 5 mg by mouth nightly.                 Irbesartan/hctz  last filled  4/20 for 100 day supply    4.  Reviewed and or Updates Made To: Patient Chart  5. Outreach Outcomes and/or actions taken: Patient filled medication and is on track to be adherent  Additional Notes:

## 2025-08-04 ENCOUNTER — PATIENT MESSAGE (OUTPATIENT)
Dept: PRIMARY CARE CLINIC | Facility: CLINIC | Age: 65
End: 2025-08-04
Payer: MEDICARE

## 2025-08-04 DIAGNOSIS — R73.01 IMPAIRED FASTING GLUCOSE: ICD-10-CM

## 2025-08-04 DIAGNOSIS — I10 ESSENTIAL HYPERTENSION: ICD-10-CM

## 2025-08-04 DIAGNOSIS — E78.2 HYPERLIPIDEMIA, MIXED: ICD-10-CM

## 2025-08-08 ENCOUNTER — LAB VISIT (OUTPATIENT)
Dept: LAB | Facility: HOSPITAL | Age: 65
End: 2025-08-08
Attending: NURSE PRACTITIONER
Payer: MEDICARE

## 2025-08-08 DIAGNOSIS — R73.01 IMPAIRED FASTING GLUCOSE: ICD-10-CM

## 2025-08-08 DIAGNOSIS — E78.2 HYPERLIPIDEMIA, MIXED: ICD-10-CM

## 2025-08-08 DIAGNOSIS — I10 ESSENTIAL HYPERTENSION: ICD-10-CM

## 2025-08-08 LAB
ABSOLUTE EOSINOPHIL (OHS): 0.2 K/UL
ABSOLUTE MONOCYTE (OHS): 0.56 K/UL (ref 0.3–1)
ABSOLUTE NEUTROPHIL COUNT (OHS): 3.94 K/UL (ref 1.8–7.7)
ALBUMIN SERPL BCP-MCNC: 4.4 G/DL (ref 3.5–5.2)
ALP SERPL-CCNC: 82 UNIT/L (ref 40–150)
ALT SERPL W/O P-5'-P-CCNC: 23 UNIT/L (ref 0–55)
ANION GAP (OHS): 9 MMOL/L (ref 8–16)
AST SERPL-CCNC: 30 UNIT/L (ref 0–50)
BASOPHILS # BLD AUTO: 0.04 K/UL
BASOPHILS NFR BLD AUTO: 0.7 %
BILIRUB SERPL-MCNC: 1.6 MG/DL (ref 0.1–1)
BUN SERPL-MCNC: 14 MG/DL (ref 8–23)
CALCIUM SERPL-MCNC: 9 MG/DL (ref 8.7–10.5)
CHLORIDE SERPL-SCNC: 102 MMOL/L (ref 95–110)
CHOLEST SERPL-MCNC: 149 MG/DL (ref 120–199)
CHOLEST/HDLC SERPL: 3.4 {RATIO} (ref 2–5)
CO2 SERPL-SCNC: 25 MMOL/L (ref 23–29)
CREAT SERPL-MCNC: 0.9 MG/DL (ref 0.5–1.4)
EAG (OHS): 97 MG/DL (ref 68–131)
ERYTHROCYTE [DISTWIDTH] IN BLOOD BY AUTOMATED COUNT: 12.1 % (ref 11.5–14.5)
GFR SERPLBLD CREATININE-BSD FMLA CKD-EPI: >60 ML/MIN/1.73/M2
GLUCOSE SERPL-MCNC: 103 MG/DL (ref 70–110)
HBA1C MFR BLD: 5 % (ref 4–5.6)
HCT VFR BLD AUTO: 41.7 % (ref 40–54)
HDLC SERPL-MCNC: 44 MG/DL (ref 40–75)
HDLC SERPL: 29.5 % (ref 20–50)
HGB BLD-MCNC: 14.9 GM/DL (ref 14–18)
IMM GRANULOCYTES # BLD AUTO: 0.01 K/UL (ref 0–0.04)
IMM GRANULOCYTES NFR BLD AUTO: 0.2 % (ref 0–0.5)
LDLC SERPL CALC-MCNC: 82.2 MG/DL (ref 63–159)
LYMPHOCYTES # BLD AUTO: 1.39 K/UL (ref 1–4.8)
MCH RBC QN AUTO: 31 PG (ref 27–31)
MCHC RBC AUTO-ENTMCNC: 35.7 G/DL (ref 32–36)
MCV RBC AUTO: 87 FL (ref 82–98)
NONHDLC SERPL-MCNC: 105 MG/DL
NUCLEATED RBC (/100WBC) (OHS): 0 /100 WBC
PLATELET # BLD AUTO: 257 K/UL (ref 150–450)
PMV BLD AUTO: 9.2 FL (ref 9.2–12.9)
POTASSIUM SERPL-SCNC: 4.3 MMOL/L (ref 3.5–5.1)
PROT SERPL-MCNC: 7.1 GM/DL (ref 6–8.4)
RBC # BLD AUTO: 4.81 M/UL (ref 4.6–6.2)
RELATIVE EOSINOPHIL (OHS): 3.3 %
RELATIVE LYMPHOCYTE (OHS): 22.6 % (ref 18–48)
RELATIVE MONOCYTE (OHS): 9.1 % (ref 4–15)
RELATIVE NEUTROPHIL (OHS): 64.1 % (ref 38–73)
SODIUM SERPL-SCNC: 136 MMOL/L (ref 136–145)
TRIGL SERPL-MCNC: 114 MG/DL (ref 30–150)
WBC # BLD AUTO: 6.14 K/UL (ref 3.9–12.7)

## 2025-08-08 PROCEDURE — 36415 COLL VENOUS BLD VENIPUNCTURE: CPT

## 2025-08-08 PROCEDURE — 85025 COMPLETE CBC W/AUTO DIFF WBC: CPT

## 2025-08-08 PROCEDURE — 80053 COMPREHEN METABOLIC PANEL: CPT

## 2025-08-08 PROCEDURE — 83036 HEMOGLOBIN GLYCOSYLATED A1C: CPT

## 2025-08-08 PROCEDURE — 80061 LIPID PANEL: CPT

## 2025-08-18 ENCOUNTER — OFFICE VISIT (OUTPATIENT)
Dept: PRIMARY CARE CLINIC | Facility: CLINIC | Age: 65
End: 2025-08-18
Payer: MEDICARE

## 2025-08-18 VITALS
BODY MASS INDEX: 32.94 KG/M2 | HEIGHT: 68 IN | SYSTOLIC BLOOD PRESSURE: 138 MMHG | DIASTOLIC BLOOD PRESSURE: 80 MMHG | WEIGHT: 217.38 LBS | HEART RATE: 84 BPM | OXYGEN SATURATION: 95 %

## 2025-08-18 DIAGNOSIS — E78.2 HYPERLIPIDEMIA, MIXED: ICD-10-CM

## 2025-08-18 DIAGNOSIS — F41.9 ANXIETY: ICD-10-CM

## 2025-08-18 DIAGNOSIS — C34.31 MALIGNANT NEOPLASM OF LOWER LOBE OF RIGHT LUNG: Primary | ICD-10-CM

## 2025-08-18 DIAGNOSIS — I70.0 AORTIC ATHEROSCLEROSIS: ICD-10-CM

## 2025-08-18 DIAGNOSIS — C61 PROSTATE CANCER: ICD-10-CM

## 2025-08-18 DIAGNOSIS — N40.0 BENIGN PROSTATIC HYPERPLASIA, UNSPECIFIED WHETHER LOWER URINARY TRACT SYMPTOMS PRESENT: ICD-10-CM

## 2025-08-18 DIAGNOSIS — I10 ESSENTIAL HYPERTENSION: ICD-10-CM

## 2025-08-18 PROCEDURE — 3044F HG A1C LEVEL LT 7.0%: CPT | Mod: CPTII,S$GLB,, | Performed by: INTERNAL MEDICINE

## 2025-08-18 PROCEDURE — 99214 OFFICE O/P EST MOD 30 MIN: CPT | Mod: S$GLB,,, | Performed by: INTERNAL MEDICINE

## 2025-08-18 PROCEDURE — 99999 PR PBB SHADOW E&M-EST. PATIENT-LVL III: CPT | Mod: PBBFAC,,, | Performed by: INTERNAL MEDICINE

## 2025-08-18 PROCEDURE — 1126F AMNT PAIN NOTED NONE PRSNT: CPT | Mod: CPTII,S$GLB,, | Performed by: INTERNAL MEDICINE

## 2025-08-18 PROCEDURE — 3079F DIAST BP 80-89 MM HG: CPT | Mod: CPTII,S$GLB,, | Performed by: INTERNAL MEDICINE

## 2025-08-18 PROCEDURE — 3075F SYST BP GE 130 - 139MM HG: CPT | Mod: CPTII,S$GLB,, | Performed by: INTERNAL MEDICINE

## 2025-08-18 PROCEDURE — 3008F BODY MASS INDEX DOCD: CPT | Mod: CPTII,S$GLB,, | Performed by: INTERNAL MEDICINE

## 2025-08-18 PROCEDURE — 1159F MED LIST DOCD IN RCRD: CPT | Mod: CPTII,S$GLB,, | Performed by: INTERNAL MEDICINE

## 2025-08-18 RX ORDER — LORAZEPAM 1 MG/1
1 TABLET ORAL DAILY PRN
Qty: 30 TABLET | Refills: 0 | Status: SHIPPED | OUTPATIENT
Start: 2025-08-18

## 2025-08-18 RX ORDER — HYDROCHLOROTHIAZIDE 12.5 MG/1
12.5 CAPSULE ORAL DAILY PRN
Qty: 30 CAPSULE | Refills: 3 | Status: SHIPPED | OUTPATIENT
Start: 2025-08-18

## (undated) DEVICE — BOWL STERILE LARGE 32OZ

## (undated) DEVICE — SYR 10CC LUER LOCK

## (undated) DEVICE — KIT ANTIFOG W/SPONG & FLUID

## (undated) DEVICE — SPONGE COTTON TRAY 4X4IN

## (undated) DEVICE — SOL WATER STRL IRR 1000ML

## (undated) DEVICE — DRAPE ABDOMINAL TIBURON 14X11

## (undated) DEVICE — DRESSING TELFA N ADH 3X8

## (undated) DEVICE — ELECTRODE REM PLYHSV RETURN 9

## (undated) DEVICE — SUT SILK 0 BLK BR FSL 18 IN

## (undated) DEVICE — NDL FLEXISION BIOPSY 21G

## (undated) DEVICE — SET TRI-LUMEN FILTERED TUBE

## (undated) DEVICE — NDL ASPIRATING VIZISHOT 20-40M

## (undated) DEVICE — CONTAINER SPECIMEN STRL 4OZ

## (undated) DEVICE — NDL HYPO REG 25G X 1 1/2

## (undated) DEVICE — PORT AIRSEAL 12/120MM LPI

## (undated) DEVICE — PENCIL GOLF STERILE

## (undated) DEVICE — SUT VICRYL 3-0 27 SH

## (undated) DEVICE — CONTAINER SPECIMEN OR STER 4OZ

## (undated) DEVICE — SYR SLIP TIP 20CC

## (undated) DEVICE — LOOP VESSEL BLUE MAXI

## (undated) DEVICE — SUT SILK 0 STRANDS 30IN BLK

## (undated) DEVICE — HEMOSTAT SURGICEL 4X8IN

## (undated) DEVICE — SEE MEDLINE ITEM 157131

## (undated) DEVICE — CLOSURE SKIN STERI STRIP 1/2X4

## (undated) DEVICE — ADAPTER SWIVEL

## (undated) DEVICE — DRAIN CHAN RND HUBLS 8MM 24FR

## (undated) DEVICE — RELOAD SUREFORM 45 3.5 BLU 6R

## (undated) DEVICE — ALCOHOL BLEND 95%

## (undated) DEVICE — DRAIN CHEST DRY SUCTION

## (undated) DEVICE — BAG ION VISION PROBE

## (undated) DEVICE — SEE MEDLINE ITEM 157117

## (undated) DEVICE — DRESSING TRANS 6X8 TEGADERM

## (undated) DEVICE — DRAPE INCISE IOBAN 2 23X17IN

## (undated) DEVICE — TAPE SILK 3IN

## (undated) DEVICE — DRESSING TRANS 4X4 TEGADERM

## (undated) DEVICE — KIT VUETIP TROCAR SWAB

## (undated) DEVICE — CYTOSPIN COLLECTION FLUID BLT

## (undated) DEVICE — LUBRICANT SURGILUBE 2 OZ

## (undated) DEVICE — STAPLER SUREFORM CRVD TIP 45

## (undated) DEVICE — GOWN SMART IMP BREATHABLE XXLG

## (undated) DEVICE — CATH BRONCHOSCOPE F/BF

## (undated) DEVICE — NDL FLTR 5MCRN BLNT TIP 18GX1

## (undated) DEVICE — CANNULA REDUCER 12-8MM

## (undated) DEVICE — DRAPE SCOPE PILLOW WARMER

## (undated) DEVICE — ELECTRODE BLADE INSULATED 1 IN

## (undated) DEVICE — SYS LABEL CORRECT MED

## (undated) DEVICE — SEAL UNIVERSAL 5MM-8MM XI

## (undated) DEVICE — CONNECTOR SWIVEL

## (undated) DEVICE — PACK ECLIPSE SET-UP W/O DRAPE

## (undated) DEVICE — RELOAD SUREFORM 45 2.5 WHT 6R

## (undated) DEVICE — DRAPE ARM DAVINCI XI

## (undated) DEVICE — DRESSING SURGICAL 1X3

## (undated) DEVICE — DRESSING TRANS 2X2 TEGADERM

## (undated) DEVICE — ADAPTER VISION PROBE & SUCTION

## (undated) DEVICE — SUT MCRYL PLUS 4-0 PS2 27IN

## (undated) DEVICE — COVER LIGHT HANDLE

## (undated) DEVICE — ADHESIVE MASTISOL VIAL 48/BX

## (undated) DEVICE — DRAPE COLUMN DAVINCI XI

## (undated) DEVICE — NDL SPINAL 18GX3.5 SPINOCAN

## (undated) DEVICE — TUBING SUC UNIV W/CONN 12FT

## (undated) DEVICE — CANNULA SEAL 12MM

## (undated) DEVICE — TRAY MINOR GEN SURG

## (undated) DEVICE — GOWN POLY REINF BRTH SLV XL

## (undated) DEVICE — SPONGE IV DRAIN 4X4 STERILE

## (undated) DEVICE — RELOAD SUREFORM 45 4.6 BLK 6R

## (undated) DEVICE — SYR ONLY LUER LOCK 20CC

## (undated) DEVICE — DRAPE THREE-QTR REINF 53X77IN

## (undated) DEVICE — PACK SET UP CONVERTORS

## (undated) DEVICE — NDL VIZISHOT 2 FLEX 22G

## (undated) DEVICE — GAUZE SPONGE 4X4 12PLY

## (undated) DEVICE — GLOVE BIOGEL SKINSENSE PI 7.5

## (undated) DEVICE — BAG INZII TISS RETRV 12/15MM